# Patient Record
Sex: MALE | Race: WHITE | NOT HISPANIC OR LATINO | Employment: OTHER | URBAN - NONMETROPOLITAN AREA
[De-identification: names, ages, dates, MRNs, and addresses within clinical notes are randomized per-mention and may not be internally consistent; named-entity substitution may affect disease eponyms.]

---

## 2017-01-05 ENCOUNTER — ANESTHESIA EVENT (OUTPATIENT)
Dept: GASTROENTEROLOGY | Facility: HOSPITAL | Age: 77
End: 2017-01-05

## 2017-01-06 ENCOUNTER — ANESTHESIA (OUTPATIENT)
Dept: GASTROENTEROLOGY | Facility: HOSPITAL | Age: 77
End: 2017-01-06

## 2017-01-06 PROCEDURE — 25010000002 PROPOFOL 10 MG/ML EMULSION: Performed by: NURSE ANESTHETIST, CERTIFIED REGISTERED

## 2017-01-06 RX ORDER — PROPOFOL 10 MG/ML
VIAL (ML) INTRAVENOUS AS NEEDED
Status: DISCONTINUED | OUTPATIENT
Start: 2017-01-06 | End: 2017-01-06 | Stop reason: SURG

## 2017-01-06 RX ADMIN — PROPOFOL 50 MG: 10 INJECTION, EMULSION INTRAVENOUS at 08:23

## 2017-01-06 RX ADMIN — PROPOFOL 50 MG: 10 INJECTION, EMULSION INTRAVENOUS at 07:53

## 2017-01-06 RX ADMIN — PROPOFOL 25 MG: 10 INJECTION, EMULSION INTRAVENOUS at 07:55

## 2017-01-06 RX ADMIN — PROPOFOL 25 MG: 10 INJECTION, EMULSION INTRAVENOUS at 07:50

## 2017-01-06 RX ADMIN — PROPOFOL 50 MG: 10 INJECTION, EMULSION INTRAVENOUS at 08:00

## 2017-01-06 RX ADMIN — PROPOFOL 50 MG: 10 INJECTION, EMULSION INTRAVENOUS at 08:05

## 2017-01-06 RX ADMIN — PROPOFOL 25 MG: 10 INJECTION, EMULSION INTRAVENOUS at 07:45

## 2017-01-06 RX ADMIN — PROPOFOL 50 MG: 10 INJECTION, EMULSION INTRAVENOUS at 08:15

## 2017-01-06 RX ADMIN — PROPOFOL 25 MG: 10 INJECTION, EMULSION INTRAVENOUS at 08:27

## 2017-01-06 NOTE — ANESTHESIA POSTPROCEDURE EVALUATION
Patient: Jonas Snow    Procedure Summary     Date Anesthesia Start Anesthesia Stop Room / Location    01/06/17 0741 0838 [facility] ENDOSCOPY 5 / [facility] ENDOSCOPY       Procedure Diagnosis Surgeon Provider    COLONOSCOPY WITH ANESTHESIA (N/A ) Colon polyps; Malignant neoplasm of colon, unspecified part of colon  (Colon polyps [K63.5]; Malignant neoplasm of colon, unspecified part of colon [C18.9]) MD Ricky Ch CRNA          Anesthesia Type: general  Last vitals  /76 (01/06/17 0845)    Temp      Pulse 81 (01/06/17 0845)   Resp 20 (01/06/17 0845)    SpO2 97 % (01/06/17 0845)      Post Anesthesia Care and Evaluation    Patient location during evaluation: PACU  Patient participation: complete - patient participated  Level of consciousness: awake and alert  Pain score: 0  Pain management: adequate  Airway patency: patent  Anesthetic complications: No PONV, no malignant hyperthermia, no difficult airway, no acute intermittent porphyria, no failed pre-medication, and no pseudocholinesterase deficiency.  Cardiovascular status: acceptable and stable  Respiratory status: acceptable  Hydration status: acceptable

## 2017-01-06 NOTE — ANESTHESIA PREPROCEDURE EVALUATION
Anesthesia Evaluation     Patient summary reviewed and Nursing notes reviewed    No history of anesthetic complications   Airway   Mallampati: I  TM distance: >3 FB  Neck ROM: full  no difficulty expected  Dental    (+) poor dentation    Pulmonary - normal exam    breath sounds clear to auscultation  (+) hx of smoking,   Cardiovascular - normal exam  Exercise tolerance: good (4-7 METS)    Neuro/Psych- negative ROS  GI/Hepatic/Renal/Endo    (+)  hiatal hernia, GERD well controlled, liver disease,     Musculoskeletal (-) negative ROS    Abdominal  - normal exam   Substance History - negative use     OB/GYN negative ob/gyn ROS         Other - negative ROS                            Anesthesia Plan    ASA 3     general     intravenous induction   Anesthetic plan and risks discussed with patient and spouse/significant other.

## 2017-01-09 ENCOUNTER — TELEPHONE (OUTPATIENT)
Dept: GASTROENTEROLOGY | Facility: CLINIC | Age: 77
End: 2017-01-09

## 2017-01-09 NOTE — TELEPHONE ENCOUNTER
Please inform patient that he does indeed have colon cancer as we already knew.  It is exactly where we knew that it was.  The rest of the polyps however did not have any cancer in them.    My recommendation remains the same.  He needs to have a right colon resection.  This needs to involve the ink artis at the hepatic flexure.  [facility]    Repeat colonoscopy with me in one year.    Haydee Krishnamurthy MD

## 2017-06-24 ENCOUNTER — HOSPITAL ENCOUNTER (EMERGENCY)
Facility: HOSPITAL | Age: 77
Discharge: HOME | End: 2017-06-24
Attending: FAMILY MEDICINE | Admitting: FAMILY MEDICINE

## 2017-06-24 ENCOUNTER — APPOINTMENT (OUTPATIENT)
Dept: CT IMAGING | Facility: HOSPITAL | Age: 77
End: 2017-06-24

## 2017-06-24 VITALS
DIASTOLIC BLOOD PRESSURE: 91 MMHG | OXYGEN SATURATION: 99 % | TEMPERATURE: 98.5 F | HEART RATE: 89 BPM | BODY MASS INDEX: 24.91 KG/M2 | SYSTOLIC BLOOD PRESSURE: 154 MMHG | HEIGHT: 70 IN | RESPIRATION RATE: 23 BRPM | WEIGHT: 174 LBS

## 2017-06-24 DIAGNOSIS — R10.13 EPIGASTRIC PAIN: ICD-10-CM

## 2017-06-24 DIAGNOSIS — K20.90 ESOPHAGITIS: Primary | ICD-10-CM

## 2017-06-24 LAB
ALBUMIN SERPL-MCNC: 4 G/DL (ref 3.5–5)
ALBUMIN/GLOB SERPL: 1.3 G/DL (ref 1.1–2.5)
ALP SERPL-CCNC: 128 U/L (ref 24–120)
ALT SERPL W P-5'-P-CCNC: 35 U/L (ref 0–54)
AMYLASE SERPL-CCNC: 96 U/L (ref 30–110)
ANION GAP SERPL CALCULATED.3IONS-SCNC: 13 MMOL/L (ref 4–13)
AST SERPL-CCNC: 39 U/L (ref 7–45)
BASOPHILS # BLD MANUAL: 0.13 10*3/MM3 (ref 0–0.2)
BASOPHILS NFR BLD AUTO: 1 % (ref 0–2)
BILIRUB SERPL-MCNC: 2.2 MG/DL (ref 0.1–1)
BUN BLD-MCNC: 17 MG/DL (ref 5–21)
BUN/CREAT SERPL: 20.7 (ref 7–25)
CALCIUM SPEC-SCNC: 9.4 MG/DL (ref 8.4–10.4)
CHLORIDE SERPL-SCNC: 102 MMOL/L (ref 98–110)
CO2 SERPL-SCNC: 24 MMOL/L (ref 24–31)
CREAT BLD-MCNC: 0.82 MG/DL (ref 0.5–1.4)
D-LACTATE SERPL-SCNC: 1.8 MMOL/L (ref 0.5–2)
D-LACTATE SERPL-SCNC: 3.4 MMOL/L (ref 0.5–2)
DEPRECATED RDW RBC AUTO: 54.1 FL (ref 40–54)
ERYTHROCYTE [DISTWIDTH] IN BLOOD BY AUTOMATED COUNT: 20.8 % (ref 12–15)
GFR SERPL CREATININE-BSD FRML MDRD: 92 ML/MIN/1.73
GLOBULIN UR ELPH-MCNC: 3.2 GM/DL
GLUCOSE BLD-MCNC: 114 MG/DL (ref 70–100)
HCT VFR BLD AUTO: 37.8 % (ref 40–52)
HGB BLD-MCNC: 11.6 G/DL (ref 14–18)
HOLD SPECIMEN: NORMAL
HOLD SPECIMEN: NORMAL
LIPASE SERPL-CCNC: 50 U/L (ref 23–203)
LYMPHOCYTES # BLD MANUAL: 1.44 10*3/MM3 (ref 0.72–4.86)
LYMPHOCYTES NFR BLD MANUAL: 11 % (ref 15–45)
LYMPHOCYTES NFR BLD MANUAL: 5 % (ref 4–12)
MCH RBC QN AUTO: 22.4 PG (ref 28–32)
MCHC RBC AUTO-ENTMCNC: 30.7 G/DL (ref 33–36)
MCV RBC AUTO: 73 FL (ref 82–95)
MICROCYTES BLD QL: ABNORMAL
MONOCYTES # BLD AUTO: 0.65 10*3/MM3 (ref 0.19–1.3)
NEUTROPHILS # BLD AUTO: 10.86 10*3/MM3 (ref 1.87–8.4)
NEUTROPHILS NFR BLD MANUAL: 83 % (ref 39–78)
PLATELET # BLD AUTO: 257 10*3/MM3 (ref 130–400)
PMV BLD AUTO: 9.2 FL (ref 6–12)
POTASSIUM BLD-SCNC: 4.2 MMOL/L (ref 3.5–5.3)
PROT SERPL-MCNC: 7.2 G/DL (ref 6.3–8.7)
RBC # BLD AUTO: 5.18 10*6/MM3 (ref 4.8–5.9)
SCAN SLIDE: NORMAL
SMALL PLATELETS BLD QL SMEAR: ADEQUATE
SODIUM BLD-SCNC: 139 MMOL/L (ref 135–145)
TROPONIN I SERPL-MCNC: 0 NG/ML (ref 0–0.07)
WBC MORPH BLD: NORMAL
WBC NRBC COR # BLD: 13.08 10*3/MM3 (ref 4.8–10.8)
WHOLE BLOOD HOLD SPECIMEN: NORMAL
WHOLE BLOOD HOLD SPECIMEN: NORMAL

## 2017-06-24 PROCEDURE — 99284 EMERGENCY DEPT VISIT MOD MDM: CPT

## 2017-06-24 PROCEDURE — 82150 ASSAY OF AMYLASE: CPT | Performed by: FAMILY MEDICINE

## 2017-06-24 PROCEDURE — 83605 ASSAY OF LACTIC ACID: CPT | Performed by: FAMILY MEDICINE

## 2017-06-24 PROCEDURE — 83690 ASSAY OF LIPASE: CPT | Performed by: FAMILY MEDICINE

## 2017-06-24 PROCEDURE — 25010000002 ONDANSETRON PER 1 MG: Performed by: FAMILY MEDICINE

## 2017-06-24 PROCEDURE — 0 IOPAMIDOL 61 % SOLUTION: Performed by: FAMILY MEDICINE

## 2017-06-24 PROCEDURE — 96375 TX/PRO/DX INJ NEW DRUG ADDON: CPT

## 2017-06-24 PROCEDURE — 85007 BL SMEAR W/DIFF WBC COUNT: CPT | Performed by: FAMILY MEDICINE

## 2017-06-24 PROCEDURE — 74177 CT ABD & PELVIS W/CONTRAST: CPT

## 2017-06-24 PROCEDURE — 84484 ASSAY OF TROPONIN QUANT: CPT

## 2017-06-24 PROCEDURE — 96361 HYDRATE IV INFUSION ADD-ON: CPT

## 2017-06-24 PROCEDURE — 93005 ELECTROCARDIOGRAM TRACING: CPT | Performed by: FAMILY MEDICINE

## 2017-06-24 PROCEDURE — 85025 COMPLETE CBC W/AUTO DIFF WBC: CPT | Performed by: FAMILY MEDICINE

## 2017-06-24 PROCEDURE — 93010 ELECTROCARDIOGRAM REPORT: CPT | Performed by: INTERNAL MEDICINE

## 2017-06-24 PROCEDURE — 80053 COMPREHEN METABOLIC PANEL: CPT | Performed by: FAMILY MEDICINE

## 2017-06-24 PROCEDURE — 96374 THER/PROPH/DIAG INJ IV PUSH: CPT

## 2017-06-24 RX ORDER — PANTOPRAZOLE SODIUM 40 MG/10ML
40 INJECTION, POWDER, LYOPHILIZED, FOR SOLUTION INTRAVENOUS ONCE
Status: COMPLETED | OUTPATIENT
Start: 2017-06-24 | End: 2017-06-24

## 2017-06-24 RX ORDER — ONDANSETRON 2 MG/ML
4 INJECTION INTRAMUSCULAR; INTRAVENOUS ONCE
Status: COMPLETED | OUTPATIENT
Start: 2017-06-24 | End: 2017-06-24

## 2017-06-24 RX ORDER — HYDROCODONE BITARTRATE AND ACETAMINOPHEN 7.5; 325 MG/1; MG/1
1 TABLET ORAL EVERY 4 HOURS PRN
Qty: 20 TABLET | Refills: 0 | Status: ON HOLD | OUTPATIENT
Start: 2017-06-24 | End: 2018-09-28

## 2017-06-24 RX ORDER — ONDANSETRON 4 MG/1
4 TABLET, ORALLY DISINTEGRATING ORAL EVERY 4 HOURS PRN
Qty: 10 TABLET | Refills: 0 | Status: ON HOLD | OUTPATIENT
Start: 2017-06-24 | End: 2019-09-02

## 2017-06-24 RX ORDER — PROMETHAZINE HYDROCHLORIDE 25 MG/ML
25 INJECTION, SOLUTION INTRAMUSCULAR; INTRAVENOUS ONCE
Status: DISCONTINUED | OUTPATIENT
Start: 2017-06-24 | End: 2017-06-24 | Stop reason: HOSPADM

## 2017-06-24 RX ORDER — ALUMINA, MAGNESIA, AND SIMETHICONE 2400; 2400; 240 MG/30ML; MG/30ML; MG/30ML
10 SUSPENSION ORAL ONCE
Status: COMPLETED | OUTPATIENT
Start: 2017-06-24 | End: 2017-06-24

## 2017-06-24 RX ADMIN — IOPAMIDOL 100 ML: 612 INJECTION, SOLUTION INTRAVENOUS at 03:53

## 2017-06-24 RX ADMIN — ONDANSETRON 4 MG: 2 INJECTION INTRAMUSCULAR; INTRAVENOUS at 03:15

## 2017-06-24 RX ADMIN — LIDOCAINE HYDROCHLORIDE 15 ML: 20 SOLUTION ORAL; TOPICAL at 05:28

## 2017-06-24 RX ADMIN — ALUMINUM HYDROXIDE, MAGNESIUM HYDROXIDE, AND DIMETHICONE 10 ML: 400; 400; 40 SUSPENSION ORAL at 05:28

## 2017-06-24 RX ADMIN — PANTOPRAZOLE SODIUM 40 MG: 40 INJECTION, POWDER, FOR SOLUTION INTRAVENOUS at 03:16

## 2017-06-24 RX ADMIN — SODIUM CHLORIDE 1000 ML: 9 INJECTION, SOLUTION INTRAVENOUS at 06:14

## 2017-06-24 RX ADMIN — ONDANSETRON HYDROCHLORIDE 4 MG: 2 SOLUTION INTRAMUSCULAR; INTRAVENOUS at 03:45

## 2017-06-24 NOTE — ED PROVIDER NOTES
Subjective   HPI Comments: Patient presents tonight for acute abdominal pain nausea vomiting.  Patient states that he has been vomiting off and on at times nonstop for the last 2 days.  He states that he kept thinking it was getting get better however it just keeps getting worse.  He states he has a known history of acid reflux and sometimes he gets this nausea and vomiting.  He states normally it does not last this long however.  Patient states that he did take his home medications he is on Prilosec at home he is also tried some over-the-counter treatments but nothing seems to be helping.  He cannot keep anything down not food and fluids.  He describes the abdominal pain as burning sharp it starts in the epigastric area and goes all the way up the substernal area.      History provided by:  Patient   used: No        Review of Systems   Constitutional: Negative for activity change, chills, fatigue and fever.   HENT: Negative for congestion and dental problem.    Eyes: Negative for pain, discharge and itching.   Respiratory: Negative for apnea and chest tightness.    Cardiovascular: Negative for chest pain and palpitations.   Gastrointestinal: Positive for abdominal pain, nausea and vomiting. Negative for diarrhea.   Endocrine: Negative for polydipsia and polyuria.   Genitourinary: Negative for difficulty urinating and dysuria.   Musculoskeletal: Negative for arthralgias, neck pain and neck stiffness.   Neurological: Negative for dizziness and headaches.   Hematological: Negative for adenopathy. Does not bruise/bleed easily.   Psychiatric/Behavioral: Negative for agitation and behavioral problems.       Past Medical History:   Diagnosis Date   • Cancer     Colon/Liver   • Colon cancer    • GERD (gastroesophageal reflux disease)    • Hiatal hernia    • Liver disease    • Peptic stricture of esophagus    • Schatzki's ring    • Vitamin D deficiency        No Known Allergies    Past Surgical History:    Procedure Laterality Date   • COLONOSCOPY N/A 1/6/2017    Procedure: COLONOSCOPY WITH ANESTHESIA;  Surgeon: Haydee Krishnamurthy MD;  Location: [facility] ENDOSCOPY;  Service:    • ENDOSCOPY  12/19/2014   • TONSILLECTOMY     • VENOUS ACCESS DEVICE (PORT) INSERTION         Family History   Problem Relation Age of Onset   • Colon cancer Neg Hx    • Colon polyps Neg Hx    • Esophageal cancer Neg Hx    • Liver cancer Neg Hx    • Liver disease Neg Hx    • Rectal cancer Neg Hx    • Stomach cancer Neg Hx        Social History     Social History   • Marital status:      Spouse name: N/A   • Number of children: N/A   • Years of education: N/A     Social History Main Topics   • Smoking status: Former Smoker     Quit date: 1/6/2009   • Smokeless tobacco: Never Used   • Alcohol use Yes      Comment: occ   • Drug use: No   • Sexual activity: Not Asked     Other Topics Concern   • None     Social History Narrative       Lab Results (last 24 hours)     Procedure Component Value Units Date/Time    CBC & Differential [[id number]] Collected:  06/24/17 0243    Specimen:  Blood Updated:  06/24/17 0416    Narrative:       The following orders were created for panel order CBC & Differential.  Procedure                               Abnormality         Status                     ---------                               -----------         ------                     Manual Differential[[id number]]          Abnormal            Final result               Scan Slide[[id number]]                                       Final result               CBC Auto Differential[[id number]]        Abnormal            Final result                 Please view results for these tests on the individual orders.    Comprehensive Metabolic Panel [[id number]]  (Abnormal) Collected:  06/24/17 0243    Specimen:  Blood Updated:  06/24/17 0307     Glucose 114 (H) mg/dL      BUN 17 mg/dL      Creatinine 0.82 mg/dL      Sodium 139 mmol/L      Potassium 4.2 mmol/L      Chloride 102  mmol/L      CO2 24.0 mmol/L      Calcium 9.4 mg/dL      Total Protein 7.2 g/dL      Albumin 4.00 g/dL      ALT (SGPT) 35 U/L      AST (SGOT) 39 U/L      Alkaline Phosphatase 128 (H) U/L      Total Bilirubin 2.2 (H) mg/dL      eGFR Non African Amer 92 mL/min/1.73      Globulin 3.2 gm/dL      A/G Ratio 1.3 g/dL      BUN/Creatinine Ratio 20.7     Anion Gap 13.0 mmol/L     Narrative:       The MDRD GFR formula is only valid for adults with stable renal function between ages 18 and 70.    Amylase [[id number]]  (Normal) Collected:  06/24/17 0243    Specimen:  Blood Updated:  06/24/17 0307     Amylase 96 U/L     Lipase [[id number]]  (Normal) Collected:  06/24/17 0243    Specimen:  Blood Updated:  06/24/17 0307     Lipase 50 U/L     Lactic Acid, Plasma [[id number]]  (Abnormal) Collected:  06/24/17 0243    Specimen:  Blood Updated:  06/24/17 0320     Lactate 3.4 (C) mmol/L     CBC Auto Differential [[id number]]  (Abnormal) Collected:  06/24/17 0243    Specimen:  Blood Updated:  06/24/17 0416     WBC 13.08 (H) 10*3/mm3      RBC 5.18 10*6/mm3      Hemoglobin 11.6 (L) g/dL      Hematocrit 37.8 (L) %      MCV 73.0 (L) fL      MCH 22.4 (L) pg      MCHC 30.7 (L) g/dL      RDW 20.8 (H) %      RDW-SD 54.1 (H) fl      MPV 9.2 fL      Platelets 257 10*3/mm3     Scan Slide [[id number]] Collected:  06/24/17 0243    Specimen:  Blood Updated:  06/24/17 0416     Scan Slide --      See Manual Differential Results       Manual Differential [[id number]]  (Abnormal) Collected:  06/24/17 0243    Specimen:  Blood Updated:  06/24/17 0416     Neutrophil % 83.0 (H) %      Lymphocyte % 11.0 (L) %      Monocyte % 5.0 %      Basophil % 1.0 %      Neutrophils Absolute 10.86 (H) 10*3/mm3      Lymphocytes Absolute 1.44 10*3/mm3      Monocytes Absolute 0.65 10*3/mm3      Basophils Absolute 0.13 10*3/mm3      Microcytes Slight/1+     WBC Morphology Normal     Platelet Estimate Adequate    POC Troponin, Rapid [[id number]]  (Normal) Collected:  06/24/17 1263     "Specimen:  Blood Updated:  06/24/17 0305     Troponin I 0.00 ng/mL       Serial Number: [id number]    : [id number]       Lactic Acid, Plasma [[id number]]  (Normal) Collected:  06/24/17 0650    Specimen:  Blood Updated:  06/24/17 0707     Lactate 1.8 mmol/L           Objective   Physical Exam   Constitutional: He is oriented to person, place, and time. He appears well-developed and well-nourished.   HENT:   Head: Normocephalic and atraumatic.   Eyes: Conjunctivae and EOM are normal. Pupils are equal, round, and reactive to light.   Neck: Normal range of motion. Neck supple.   Cardiovascular: Normal rate and regular rhythm.    Pulmonary/Chest: Effort normal and breath sounds normal.   Abdominal: Soft. Bowel sounds are normal.   Musculoskeletal: He exhibits no tenderness or deformity.   Neurological: He is alert and oriented to person, place, and time.   Skin: Skin is warm and dry.   Nursing note and vitals reviewed.      Procedures         CT Abdomen Pelvis With Contrast    (Results Pending)       /91  Pulse 89  Temp 98.5 °F (36.9 °C) (Oral)   Resp 23  Ht 69.5\" (176.5 cm)  Wt 174 lb (78.9 kg)  SpO2 99%  BMI 25.33 kg/m2    ED Course    ED Course   Comment By Time   I took over from Dr. Keen at shift change.  When the patient's lab work all came back I went to talk to him.  I told him the lab work looks fine and his CT scan just showed the esophagitis.  I told him if the vomiting and pain been bad enough we could put him in the hospital or with a try to treat him as an outpatient.  He preferred to be treated as an outpatient so we will do that.  I spoke with his son outside the room and told him the same things.  He is discharged in stable condition. Deshaun Roland Jr., MD 06/24 0806       Medications   promethazine (PHENERGAN) injection 25 mg (not administered)   ondansetron (ZOFRAN) injection 4 mg (4 mg Intravenous Given 6/24/17 0315)   pantoprazole (PROTONIX) injection 40 mg (40 mg Intravenous Given " 6/24/17 6966)   ondansetron (ZOFRAN) injection 4 mg (4 mg Intravenous Given 6/24/17 7745)   iopamidol (ISOVUE-300) 61 % injection 100 mL (100 mL Intravenous Given 6/24/17 7463)   aluminum-magnesium hydroxide-simethicone (MAALOX MAX) 400-400-40 MG/5ML suspension 10 mL (10 mL Oral Given 6/24/17 0528)   lidocaine viscous (XYLOCAINE) 2 % mouth solution 15 mL (15 mL Mouth/Throat Given 6/24/17 0528)   sodium chloride 0.9 % bolus 1,000 mL (1,000 mL Intravenous New Bag 6/24/17 0614)            MDM  Number of Diagnoses or Management Options  Epigastric pain: established and worsening  Esophagitis: established and worsening     Amount and/or Complexity of Data Reviewed  Clinical lab tests: ordered and reviewed  Tests in the radiology section of CPT®: ordered and reviewed  Tests in the medicine section of CPT®: ordered and reviewed  Decide to obtain previous medical records or to obtain history from someone other than the patient: yes  Review and summarize past medical records: yes  Independent visualization of images, tracings, or specimens: yes    Risk of Complications, Morbidity, and/or Mortality  Presenting problems: high  Diagnostic procedures: high  Management options: moderate  General comments: Patient left in the care of Dr. Roland at shift change.    Patient Progress  Patient progress: improved      Final diagnoses:   Esophagitis   Epigastric pain          Deshaun Roland Jr., MD  06/24/17 6941

## 2017-09-06 ENCOUNTER — TELEPHONE (OUTPATIENT)
Dept: GASTROENTEROLOGY | Facility: CLINIC | Age: 77
End: 2017-09-06

## 2017-09-06 NOTE — TELEPHONE ENCOUNTER
Patient has appt for 10/04/17 but says he isn't gonna be able to wait till then.  He is unable to swallow his medicine pills and is unable to drink except very small sips of water.  While at [facility] last week he was unable to eat and what food he got down he vomited up.  Can this patient be worked in sooner?

## 2017-09-06 NOTE — TELEPHONE ENCOUNTER
Patient said he despises the thought of having to go to the hospital as much as he has had to be in one in the past.  He said he is going to wait until it gets him down before he goes.  He has been unable to eat today but as long as he can take some sips of water he said he was going to wait.  I advised against waiting if it was to the point he couldn't eat but he wouldn't change his mind.  Patient was very polite during the phone call and voiced his understanding of everything I was telling him.

## 2017-09-06 NOTE — TELEPHONE ENCOUNTER
It sounds to me like the patient is much more ill than he was when he went to the ER last time.  I feel that he should go back to the ER as he may need to be admitted.  We are very booked in the office and even if I could work him in with still could not get an endoscopy performed until over a month.  Left me know what he decides.  I think Ivory can confirm this that we have no openings.

## 2017-10-04 ENCOUNTER — OFFICE VISIT (OUTPATIENT)
Dept: GASTROENTEROLOGY | Facility: CLINIC | Age: 77
End: 2017-10-04

## 2017-10-04 VITALS
SYSTOLIC BLOOD PRESSURE: 142 MMHG | WEIGHT: 160 LBS | BODY MASS INDEX: 23.7 KG/M2 | DIASTOLIC BLOOD PRESSURE: 76 MMHG | HEIGHT: 69 IN | HEART RATE: 73 BPM | OXYGEN SATURATION: 99 % | TEMPERATURE: 97.3 F

## 2017-10-04 DIAGNOSIS — K21.9 GASTROESOPHAGEAL REFLUX DISEASE WITHOUT ESOPHAGITIS: ICD-10-CM

## 2017-10-04 DIAGNOSIS — C22.7 OTHER SPECIFIED CARCINOMAS OF LIVER (HCC): ICD-10-CM

## 2017-10-04 DIAGNOSIS — C18.9 MALIGNANT NEOPLASM OF COLON, UNSPECIFIED PART OF COLON (HCC): ICD-10-CM

## 2017-10-04 DIAGNOSIS — K92.0 HEMATEMESIS WITHOUT NAUSEA: ICD-10-CM

## 2017-10-04 DIAGNOSIS — K22.2 SCHATZKI'S RING: ICD-10-CM

## 2017-10-04 DIAGNOSIS — R13.19 OTHER DYSPHAGIA: Primary | ICD-10-CM

## 2017-10-04 PROBLEM — C22.9 LIVER CANCER (HCC): Status: ACTIVE | Noted: 2017-10-04

## 2017-10-04 PROCEDURE — 99214 OFFICE O/P EST MOD 30 MIN: CPT | Performed by: NURSE PRACTITIONER

## 2017-10-04 RX ORDER — OMEPRAZOLE 20 MG/1
20 CAPSULE, DELAYED RELEASE ORAL 2 TIMES DAILY
Qty: 60 CAPSULE | Refills: 11 | Status: SHIPPED | OUTPATIENT
Start: 2017-10-04 | End: 2017-10-04 | Stop reason: SDUPTHER

## 2017-10-04 RX ORDER — SUCRALFATE ORAL 1 G/10ML
1 SUSPENSION ORAL 4 TIMES DAILY
Qty: 1 EACH | Refills: 1 | Status: SHIPPED | OUTPATIENT
Start: 2017-10-04 | End: 2018-01-19 | Stop reason: HOSPADM

## 2017-10-04 RX ORDER — OMEPRAZOLE 20 MG/1
20 CAPSULE, DELAYED RELEASE ORAL 2 TIMES DAILY
Qty: 60 CAPSULE | Refills: 11 | Status: SHIPPED | OUTPATIENT
Start: 2017-10-04 | End: 2017-10-05 | Stop reason: HOSPADM

## 2017-10-04 NOTE — PROGRESS NOTES
"Chief Complaint:   Chief Complaint   Patient presents with   • Difficulty Swallowing     Patient states that he couldn't swallow without getting sick.         Patient ID: Jonas Snow is a 77 y.o. male     History of Present Illness:  This is a very pleasant 77-year-old male who was referred to our office from [facility] with complaints of difficulty swallowing and vomiting.  The patient tells me that he has begun to have difficulty swallowing for 2-3 months now.  He states that he has difficulty swallowing his pills as well as solids.  He states that he tries to eat more soft than liquid food.  He states that it feels as though it gets stuck in the mid esophageal area.  The patient states that approximately 2 weeks ago he did vomit up blood twice.  He describes it as bright red.  He denies any recent nosebleeds prior to that.  The patient does.  A history of GERD and has had Schatzki's ring in the past.    The patient denies any epigastric pain, odynophagia, pyrosis.  He denies any fever or chills.  He denies any melena or hematochezia.  The patient does also carry a history of colon cancer with metastases to the liver he [facility]  He denies any unintentional weight loss or loss of appetite.  He states that he is unsure of what meds he is taking.  He states that he thinks he is taking a \"stomach pill\" but is not sure.  He denies any chronic NSAID use.      On 11/18/14 the patient underwent an upper endoscopy for complaints of dysphagia with findings of benign appearing intrinsic moderate stenosis 9 mm in diameter found at the GE junction that was dilated.  On 12/19/14 the patient again underwent an upper endoscopy for dysphagia with findings of a non-obstructing Schatzki's ring that was dilated at that GE junction.    Past Medical History:   Diagnosis Date   • Cancer     Colon/Liver   • Colon cancer    • GERD (gastroesophageal reflux disease)    • Hiatal hernia    • " Liver disease    • Peptic stricture of esophagus    • Schatzki's ring    • Vitamin D deficiency        Past Surgical History:   Procedure Laterality Date   • COLONOSCOPY N/A 1/6/2017    Procedure: COLONOSCOPY WITH ANESTHESIA;  Surgeon: Haydee Krishnamurthy MD;  Location: [facility] ENDOSCOPY;  Service:    • ENDOSCOPY  12/19/2014   • TONSILLECTOMY     • VENOUS ACCESS DEVICE (PORT) INSERTION           Current Outpatient Prescriptions:   •  aspirin 81 MG EC tablet, Take 81 mg by mouth Daily., Disp: , Rfl:   •  aspirin  MG tablet, Take 325 mg by mouth Every 6 (Six) Hours As Needed. Take 1/2 a pill daily, Disp: , Rfl:   •  cholecalciferol (VITAMIN D3) 1000 UNITS tablet, Take 1,000 Units by mouth Daily., Disp: , Rfl:   •  HYDROcodone-acetaminophen (NORCO) 7.5-325 MG per tablet, Take 1 tablet by mouth Every 4 (Four) Hours As Needed for Moderate Pain (4-6)., Disp: 20 tablet, Rfl: 0  •  NON FORMULARY, Pill for prostate, Disp: , Rfl:   •  omeprazole (priLOSEC) 20 MG capsule, Take 1 capsule by mouth 2 (Two) Times a Day., Disp: 60 capsule, Rfl: 11  •  ondansetron ODT (ZOFRAN-ODT) 4 MG disintegrating tablet, Take 1 tablet by mouth Every 4 (Four) Hours As Needed for Nausea or Vomiting., Disp: 10 tablet, Rfl: 0  •  sucralfate (CARAFATE) 1 GM/10ML suspension, Take 10 mL by mouth 4 (Four) Times a Day., Disp: 1 each, Rfl: 1    No Known Allergies    Social History     Social History   • Marital status:      Spouse name: N/A   • Number of children: N/A   • Years of education: N/A     Occupational History   • Not on file.     Social History Main Topics   • Smoking status: Former Smoker     Quit date: 1/6/2009   • Smokeless tobacco: Never Used   • Alcohol use Yes      Comment: occ   • Drug use: No   • Sexual activity: Not on file     Other Topics Concern   • Not on file     Social History Narrative       Family History   Problem Relation Age of Onset   • Colon cancer Neg Hx    • Colon polyps Neg Hx    • Esophageal cancer Neg Hx    •  "Liver cancer Neg Hx    • Liver disease Neg Hx    • Rectal cancer Neg Hx    • Stomach cancer Neg Hx        Vitals:    10/04/17 0857   BP: 142/76   Pulse: 73   Temp: 97.3 °F (36.3 °C)   SpO2: 99%   Weight: 160 lb (72.6 kg)   Height: 69\" (175.3 cm)       Review of Systems:    General:    Present -feeling well   Skin:    Not Present-Rash   HEENT:     Not Present-Acute visual changes or Acute hearing changes   Neck :    Not Present- swollen glands   Genitourinary:      Not Present- burning, frequency, urgency hematuria, dysuria,   Cardiovascular:   Not Present-chest pain, palpitations, or pressure   Respiratory:   Not Present- shortness of breath or cough   Gastrointestinal:  Musculoskeletal:  Neurological:  Psychiatric:   Present as mentioned in the HP    Not Present. Recent gait disturbances.    Not Present-Seizures and weakness in extremities.    Not Present- Anxiety or Depression.       Physical Exam:    General Appearance:    Alert, cooperative, in no acute distress   Psych:    Mood appropriate    Eyes:          conjunctivae and sclerae normal, no   icterus, no pallor   ENMT:    Ears appear intact with no abnormalities noted oral mucosa moist   Neck:   No adenopathy, supple, trachea midline, no thyromegaly, no   carotid bruit, no JVD    Cardiovascular:    Regular rhythm and normal rate, normal S1 and S2, no            murmur, no gallop, no rub, no click   Gastrointestinal:     Inspection normal.  Normal bowel sounds, no masses, no organomegaly, soft round non-tender, non-distended, no guarding, no rebound or tenderness. No hepatosplenomegaly.   Skin:   No bleeding, bruising or rash   Neurologic:   nonfocal       Lab Results   Component Value Date    WBC 13.08 (H) 06/24/2017    HGB 11.6 (L) 06/24/2017    HCT 37.8 (L) 06/24/2017     06/24/2017        Lab Results   Component Value Date     06/24/2017    K 4.2 06/24/2017     06/24/2017    CO2 24.0 06/24/2017    BUN 17 06/24/2017    CREATININE 0.82 " 06/24/2017    BILITOT 2.2 (H) 06/24/2017    ALKPHOS 128 (H) 06/24/2017    ALT 35 06/24/2017    AST 39 06/24/2017    GLUCOSE 114 (H) 06/24/2017       No results found for: INR    Assessment and Plan:    Jonas was seen today for difficulty swallowing.    Diagnoses and all orders for this visit:    Other dysphagia  -     Case Request; Standing  -     Case Request EGD    Hematemesis without nausea  -     Case Request; Standing  -     Case Request    Gastroesophageal reflux disease without esophagitis  -     Case Request; Standing  -     Case Request    Schatzki's ring  -     Case Request; Standing  -     Case Request    Other specified carcinomas of liver  Currently being treated with chemotherapy     Malignant neoplasm of colon, unspecified part of colon    Other orders  -     sucralfate (CARAFATE) 1 GM/10ML suspension; Take 10 mL by mouth 4 (Four) Times a Day.  -     omeprazole (priLOSEC) 20 MG capsule; Take 1 capsule by mouth 2 (Two) Times a Day.  -     Implement Anesthesia Orders Day of Procedure; Standing  -     Obtain Informed Consent; Standing      I have scheduled the patient for an EGD.  I have ordered Carafate suspension as the patient states he has a very difficult time swallowing pills or solids.  I will also increase his Prilosec to 20 mg however he is not sure if he has been taking any of this medication.    Next follow-up appointment    The risks, benefits, and alternatives of endoscopy were reviewed with the patient today.  Risks including perforation, with or without dilation, possibly requiring surgery.  Additional risks include risk of bleeding.  There is also the risk of a drug reaction or problems with anesthesia.  This will be discussed with the further by the anesthesia team on the day of the procedure. The benefits include the diagnosis and management of disease of the upper digestive tract.  Alternatives to endoscopy include upper GI series, laboratory testing, radiographic evaluation, or no  intervention.  The patient verbalizes understanding and agrees.      EMR Dragon/Transcription disclaimer:  Much of this encounter note is an electronic transcription/translation of spoken language to printed text. The electronic translation of spoken language may permit erroneous, or at times, nonsensical words or phrases to be inadvertently transcribed; although I have reviewed the note for such errors, some may still exist.

## 2017-10-05 ENCOUNTER — HOSPITAL ENCOUNTER (OUTPATIENT)
Facility: HOSPITAL | Age: 77
Setting detail: HOSPITAL OUTPATIENT SURGERY
Discharge: HOME | End: 2017-10-05
Attending: INTERNAL MEDICINE | Admitting: INTERNAL MEDICINE

## 2017-10-05 ENCOUNTER — ANESTHESIA EVENT (OUTPATIENT)
Dept: GASTROENTEROLOGY | Facility: HOSPITAL | Age: 77
End: 2017-10-05

## 2017-10-05 ENCOUNTER — ANESTHESIA (OUTPATIENT)
Dept: GASTROENTEROLOGY | Facility: HOSPITAL | Age: 77
End: 2017-10-05

## 2017-10-05 VITALS
TEMPERATURE: 97.6 F | HEIGHT: 69 IN | DIASTOLIC BLOOD PRESSURE: 81 MMHG | WEIGHT: 158 LBS | BODY MASS INDEX: 23.4 KG/M2 | OXYGEN SATURATION: 100 % | HEART RATE: 61 BPM | SYSTOLIC BLOOD PRESSURE: 141 MMHG | RESPIRATION RATE: 18 BRPM

## 2017-10-05 DIAGNOSIS — K92.0 HEMATEMESIS WITHOUT NAUSEA: ICD-10-CM

## 2017-10-05 DIAGNOSIS — R13.19 OTHER DYSPHAGIA: ICD-10-CM

## 2017-10-05 DIAGNOSIS — K22.2 SCHATZKI'S RING: ICD-10-CM

## 2017-10-05 DIAGNOSIS — K21.9 GASTROESOPHAGEAL REFLUX DISEASE WITHOUT ESOPHAGITIS: ICD-10-CM

## 2017-10-05 PROCEDURE — 43248 EGD GUIDE WIRE INSERTION: CPT | Performed by: INTERNAL MEDICINE

## 2017-10-05 PROCEDURE — 88312 SPECIAL STAINS GROUP 1: CPT | Performed by: INTERNAL MEDICINE

## 2017-10-05 PROCEDURE — 43239 EGD BIOPSY SINGLE/MULTIPLE: CPT | Performed by: INTERNAL MEDICINE

## 2017-10-05 PROCEDURE — 88305 TISSUE EXAM BY PATHOLOGIST: CPT | Performed by: INTERNAL MEDICINE

## 2017-10-05 PROCEDURE — 25010000002 PROPOFOL 10 MG/ML EMULSION: Performed by: NURSE ANESTHETIST, CERTIFIED REGISTERED

## 2017-10-05 RX ORDER — SODIUM CHLORIDE 9 MG/ML
500 INJECTION, SOLUTION INTRAVENOUS CONTINUOUS PRN
Status: DISCONTINUED | OUTPATIENT
Start: 2017-10-05 | End: 2017-10-05 | Stop reason: HOSPADM

## 2017-10-05 RX ORDER — SODIUM CHLORIDE 0.9 % (FLUSH) 0.9 %
3 SYRINGE (ML) INJECTION AS NEEDED
Status: DISCONTINUED | OUTPATIENT
Start: 2017-10-05 | End: 2017-10-05 | Stop reason: HOSPADM

## 2017-10-05 RX ORDER — PANTOPRAZOLE SODIUM 40 MG/1
40 TABLET, DELAYED RELEASE ORAL DAILY
Qty: 60 TABLET | Refills: 11 | Status: SHIPPED | OUTPATIENT
Start: 2017-10-05 | End: 2017-10-05

## 2017-10-05 RX ORDER — PANTOPRAZOLE SODIUM 40 MG/1
40 TABLET, DELAYED RELEASE ORAL
Qty: 60 TABLET | Refills: 11 | Status: SHIPPED | OUTPATIENT
Start: 2017-10-05 | End: 2020-08-21 | Stop reason: HOSPADM

## 2017-10-05 RX ORDER — LIDOCAINE HYDROCHLORIDE 20 MG/ML
INJECTION, SOLUTION INFILTRATION; PERINEURAL AS NEEDED
Status: DISCONTINUED | OUTPATIENT
Start: 2017-10-05 | End: 2017-10-05 | Stop reason: SURG

## 2017-10-05 RX ORDER — PROPOFOL 10 MG/ML
VIAL (ML) INTRAVENOUS AS NEEDED
Status: DISCONTINUED | OUTPATIENT
Start: 2017-10-05 | End: 2017-10-05 | Stop reason: SURG

## 2017-10-05 RX ADMIN — PROPOFOL 25 MG: 10 INJECTION, EMULSION INTRAVENOUS at 07:51

## 2017-10-05 RX ADMIN — LIDOCAINE HYDROCHLORIDE 0.5 ML: 10 INJECTION, SOLUTION EPIDURAL; INFILTRATION; INTRACAUDAL; PERINEURAL at 07:29

## 2017-10-05 RX ADMIN — SODIUM CHLORIDE 500 ML: 9 INJECTION, SOLUTION INTRAVENOUS at 07:30

## 2017-10-05 RX ADMIN — PROPOFOL 25 MG: 10 INJECTION, EMULSION INTRAVENOUS at 07:55

## 2017-10-05 RX ADMIN — PROPOFOL 25 MG: 10 INJECTION, EMULSION INTRAVENOUS at 08:06

## 2017-10-05 RX ADMIN — PROPOFOL 25 MG: 10 INJECTION, EMULSION INTRAVENOUS at 08:02

## 2017-10-05 RX ADMIN — LIDOCAINE HYDROCHLORIDE 50 MG: 20 INJECTION, SOLUTION INFILTRATION; PERINEURAL at 07:47

## 2017-10-05 RX ADMIN — PROPOFOL 50 MG: 10 INJECTION, EMULSION INTRAVENOUS at 07:47

## 2017-10-05 RX ADMIN — PROPOFOL 25 MG: 10 INJECTION, EMULSION INTRAVENOUS at 07:59

## 2017-10-05 NOTE — H&P (VIEW-ONLY)
"Chief Complaint:   Chief Complaint   Patient presents with   • Difficulty Swallowing     Patient states that he couldn't swallow without getting sick.         Patient ID: Jonas Snow is a 77 y.o. male     History of Present Illness:  This is a very pleasant 77-year-old male who was referred to our office from [facility] with complaints of difficulty swallowing and vomiting.  The patient tells me that he has begun to have difficulty swallowing for 2-3 months now.  He states that he has difficulty swallowing his pills as well as solids.  He states that he tries to eat more soft than liquid food.  He states that it feels as though it gets stuck in the mid esophageal area.  The patient states that approximately 2 weeks ago he did vomit up blood twice.  He describes it as bright red.  He denies any recent nosebleeds prior to that.  The patient does.  A history of GERD and has had Schatzki's ring in the past.    The patient denies any epigastric pain, odynophagia, pyrosis.  He denies any fever or chills.  He denies any melena or hematochezia.  The patient does also carry a history of colon cancer with metastases to the liver he [facility]  He denies any unintentional weight loss or loss of appetite.  He states that he is unsure of what meds he is taking.  He states that he thinks he is taking a \"stomach pill\" but is not sure.  He denies any chronic NSAID use.      On 11/18/14 the patient underwent an upper endoscopy for complaints of dysphagia with findings of benign appearing intrinsic moderate stenosis 9 mm in diameter found at the GE junction that was dilated.  On 12/19/14 the patient again underwent an upper endoscopy for dysphagia with findings of a non-obstructing Schatzki's ring that was dilated at that GE junction.    Past Medical History:   Diagnosis Date   • Cancer     Colon/Liver   • Colon cancer    • GERD (gastroesophageal reflux disease)    • Hiatal hernia    • " Liver disease    • Peptic stricture of esophagus    • Schatzki's ring    • Vitamin D deficiency        Past Surgical History:   Procedure Laterality Date   • COLONOSCOPY N/A 1/6/2017    Procedure: COLONOSCOPY WITH ANESTHESIA;  Surgeon: Haydee Krishnamurthy MD;  Location: [facility] ENDOSCOPY;  Service:    • ENDOSCOPY  12/19/2014   • TONSILLECTOMY     • VENOUS ACCESS DEVICE (PORT) INSERTION           Current Outpatient Prescriptions:   •  aspirin 81 MG EC tablet, Take 81 mg by mouth Daily., Disp: , Rfl:   •  aspirin  MG tablet, Take 325 mg by mouth Every 6 (Six) Hours As Needed. Take 1/2 a pill daily, Disp: , Rfl:   •  cholecalciferol (VITAMIN D3) 1000 UNITS tablet, Take 1,000 Units by mouth Daily., Disp: , Rfl:   •  HYDROcodone-acetaminophen (NORCO) 7.5-325 MG per tablet, Take 1 tablet by mouth Every 4 (Four) Hours As Needed for Moderate Pain (4-6)., Disp: 20 tablet, Rfl: 0  •  NON FORMULARY, Pill for prostate, Disp: , Rfl:   •  omeprazole (priLOSEC) 20 MG capsule, Take 1 capsule by mouth 2 (Two) Times a Day., Disp: 60 capsule, Rfl: 11  •  ondansetron ODT (ZOFRAN-ODT) 4 MG disintegrating tablet, Take 1 tablet by mouth Every 4 (Four) Hours As Needed for Nausea or Vomiting., Disp: 10 tablet, Rfl: 0  •  sucralfate (CARAFATE) 1 GM/10ML suspension, Take 10 mL by mouth 4 (Four) Times a Day., Disp: 1 each, Rfl: 1    No Known Allergies    Social History     Social History   • Marital status:      Spouse name: N/A   • Number of children: N/A   • Years of education: N/A     Occupational History   • Not on file.     Social History Main Topics   • Smoking status: Former Smoker     Quit date: 1/6/2009   • Smokeless tobacco: Never Used   • Alcohol use Yes      Comment: occ   • Drug use: No   • Sexual activity: Not on file     Other Topics Concern   • Not on file     Social History Narrative       Family History   Problem Relation Age of Onset   • Colon cancer Neg Hx    • Colon polyps Neg Hx    • Esophageal cancer Neg Hx    •  "Liver cancer Neg Hx    • Liver disease Neg Hx    • Rectal cancer Neg Hx    • Stomach cancer Neg Hx        Vitals:    10/04/17 0857   BP: 142/76   Pulse: 73   Temp: 97.3 °F (36.3 °C)   SpO2: 99%   Weight: 160 lb (72.6 kg)   Height: 69\" (175.3 cm)       Review of Systems:    General:    Present -feeling well   Skin:    Not Present-Rash   HEENT:     Not Present-Acute visual changes or Acute hearing changes   Neck :    Not Present- swollen glands   Genitourinary:      Not Present- burning, frequency, urgency hematuria, dysuria,   Cardiovascular:   Not Present-chest pain, palpitations, or pressure   Respiratory:   Not Present- shortness of breath or cough   Gastrointestinal:  Musculoskeletal:  Neurological:  Psychiatric:   Present as mentioned in the HP    Not Present. Recent gait disturbances.    Not Present-Seizures and weakness in extremities.    Not Present- Anxiety or Depression.       Physical Exam:    General Appearance:    Alert, cooperative, in no acute distress   Psych:    Mood appropriate    Eyes:          conjunctivae and sclerae normal, no   icterus, no pallor   ENMT:    Ears appear intact with no abnormalities noted oral mucosa moist   Neck:   No adenopathy, supple, trachea midline, no thyromegaly, no   carotid bruit, no JVD    Cardiovascular:    Regular rhythm and normal rate, normal S1 and S2, no            murmur, no gallop, no rub, no click   Gastrointestinal:     Inspection normal.  Normal bowel sounds, no masses, no organomegaly, soft round non-tender, non-distended, no guarding, no rebound or tenderness. No hepatosplenomegaly.   Skin:   No bleeding, bruising or rash   Neurologic:   nonfocal       Lab Results   Component Value Date    WBC 13.08 (H) 06/24/2017    HGB 11.6 (L) 06/24/2017    HCT 37.8 (L) 06/24/2017     06/24/2017        Lab Results   Component Value Date     06/24/2017    K 4.2 06/24/2017     06/24/2017    CO2 24.0 06/24/2017    BUN 17 06/24/2017    CREATININE 0.82 " 06/24/2017    BILITOT 2.2 (H) 06/24/2017    ALKPHOS 128 (H) 06/24/2017    ALT 35 06/24/2017    AST 39 06/24/2017    GLUCOSE 114 (H) 06/24/2017       No results found for: INR    Assessment and Plan:    Jonas was seen today for difficulty swallowing.    Diagnoses and all orders for this visit:    Other dysphagia  -     Case Request; Standing  -     Case Request EGD    Hematemesis without nausea  -     Case Request; Standing  -     Case Request    Gastroesophageal reflux disease without esophagitis  -     Case Request; Standing  -     Case Request    Schatzki's ring  -     Case Request; Standing  -     Case Request    Other specified carcinomas of liver  Currently being treated with chemotherapy     Malignant neoplasm of colon, unspecified part of colon    Other orders  -     sucralfate (CARAFATE) 1 GM/10ML suspension; Take 10 mL by mouth 4 (Four) Times a Day.  -     omeprazole (priLOSEC) 20 MG capsule; Take 1 capsule by mouth 2 (Two) Times a Day.  -     Implement Anesthesia Orders Day of Procedure; Standing  -     Obtain Informed Consent; Standing      I have scheduled the patient for an EGD.  I have ordered Carafate suspension as the patient states he has a very difficult time swallowing pills or solids.  I will also increase his Prilosec to 20 mg however he is not sure if he has been taking any of this medication.    Next follow-up appointment    The risks, benefits, and alternatives of endoscopy were reviewed with the patient today.  Risks including perforation, with or without dilation, possibly requiring surgery.  Additional risks include risk of bleeding.  There is also the risk of a drug reaction or problems with anesthesia.  This will be discussed with the further by the anesthesia team on the day of the procedure. The benefits include the diagnosis and management of disease of the upper digestive tract.  Alternatives to endoscopy include upper GI series, laboratory testing, radiographic evaluation, or no  intervention.  The patient verbalizes understanding and agrees.      EMR Dragon/Transcription disclaimer:  Much of this encounter note is an electronic transcription/translation of spoken language to printed text. The electronic translation of spoken language may permit erroneous, or at times, nonsensical words or phrases to be inadvertently transcribed; although I have reviewed the note for such errors, some may still exist.

## 2017-10-05 NOTE — ANESTHESIA POSTPROCEDURE EVALUATION
Patient: Jonas Snow    Procedure Summary     Date Anesthesia Start Anesthesia Stop Room / Location    10/05/17 0744 0813 [facility] ENDOSCOPY 2 / [facility]       Procedure Diagnosis Surgeon Provider    ESOPHAGOGASTRODUODENOSCOPY WITH ANESTHESIA (N/A Esophagus) Schatzki's ring; Other dysphagia; Gastroesophageal reflux disease without esophagitis; Hematemesis without nausea  (Schatzki's ring [K22.2]; Other dysphagia [R13.19]; Gastroesophageal reflux disease without esophagitis [K21.9]; Hematemesis without nausea [K92.0]) MD Ricky Ch CRNA          Anesthesia Type: general  Last vitals  BP   142/73 (10/05/17 0830)    Temp        Pulse   63 (10/05/17 0830)   Resp   13 (10/05/17 0830)    SpO2   100 % (10/05/17 0830)      Post Anesthesia Care and Evaluation    Patient location during evaluation: PHASE II  Patient participation: complete - patient participated  Level of consciousness: awake and alert  Pain score: 0  Pain management: adequate  Airway patency: patent  Anesthetic complications: No anesthetic complications  PONV Status: none  Cardiovascular status: acceptable and stable  Respiratory status: acceptable  Hydration status: acceptable

## 2017-10-05 NOTE — ANESTHESIA PREPROCEDURE EVALUATION
Anesthesia Evaluation     Patient summary reviewed   no history of anesthetic complications:  NPO Solid Status: > 8 hours  NPO Liquid Status: > 8 hours     Airway   Mallampati: II  TM distance: >3 FB  Neck ROM: full  no difficulty expected  Dental      Pulmonary    (+) a smoker Former,   (-) shortness of breath, sleep apnea  Cardiovascular   Exercise tolerance: good (4-7 METS)    (-) hypertension, past MI, cardiac stents      Neuro/Psych  (-) seizures, TIA, CVA  GI/Hepatic/Renal/Endo    (+)  hiatal hernia, GERD, liver disease,     ROS Comment: Esophageal stricture      Colon cancer with mets to liver    Musculoskeletal     Abdominal    Substance History      OB/GYN          Other      history of cancer active                                    Anesthesia Plan    ASA 3     general     intravenous induction   Anesthetic plan and risks discussed with patient.

## 2017-10-05 NOTE — PLAN OF CARE
Problem: Patient Care Overview (Adult)  Goal: Plan of Care Review  Outcome: Ongoing (interventions implemented as appropriate)    10/05/17 0761   Coping/Psychosocial Response Interventions   Plan Of Care Reviewed With patient   Patient Care Overview   Progress no change   Outcome Evaluation   Outcome Summary/Follow up Plan tolerating well         Problem: GI Endoscopy (Adult)  Goal: Signs and Symptoms of Listed Potential Problems Will be Absent or Manageable (GI Endoscopy)  Outcome: Ongoing (interventions implemented as appropriate)

## 2017-10-05 NOTE — PLAN OF CARE
Problem: Patient Care Overview (Adult)  Goal: Plan of Care Review  Outcome: Outcome(s) achieved Date Met:  10/05/17    10/05/17 0815   Coping/Psychosocial Response Interventions   Plan Of Care Reviewed With patient;family   Patient Care Overview   Progress improving   Outcome Evaluation   Outcome Summary/Follow up Plan discharge criteria met

## 2017-10-05 NOTE — PLAN OF CARE
Problem: Patient Care Overview (Adult)  Goal: Adult Individualization and Mutuality  Outcome: Ongoing (interventions implemented as appropriate)    10/05/17 0657   Individualization   Patient Specific Preferences none

## 2017-10-05 NOTE — PLAN OF CARE
Problem: GI Endoscopy (Adult)  Goal: Signs and Symptoms of Listed Potential Problems Will be Absent or Manageable (GI Endoscopy)  Outcome: Outcome(s) achieved Date Met:  10/05/17    10/05/17 0815   GI Endoscopy   Problems Assessed (GI Endoscopy) all   Problems Present (GI Endoscopy) none

## 2017-10-09 LAB
CYTO UR: NORMAL
LAB AP CASE REPORT: NORMAL
LAB AP CLINICAL INFORMATION: NORMAL
Lab: NORMAL
PATH REPORT.FINAL DX SPEC: NORMAL
PATH REPORT.GROSS SPEC: NORMAL

## 2017-10-10 ENCOUNTER — TELEPHONE (OUTPATIENT)
Dept: GASTROENTEROLOGY | Facility: CLINIC | Age: 77
End: 2017-10-10

## 2017-10-10 NOTE — TELEPHONE ENCOUNTER
Please inform patient that his esophageal biopsies only show inflammation.  There is no cancer or precancer seen.  Continue to take medications as I prescribed after the endoscopy.  We will repeat endoscopy as planned.    Haydee Krishnamurthy MD

## 2017-10-19 ENCOUNTER — TELEPHONE (OUTPATIENT)
Dept: GASTROENTEROLOGY | Facility: CLINIC | Age: 77
End: 2017-10-19

## 2017-10-19 ENCOUNTER — PREP FOR SURGERY (OUTPATIENT)
Dept: OTHER | Facility: HOSPITAL | Age: 77
End: 2017-10-19

## 2017-10-19 DIAGNOSIS — R13.19 OTHER DYSPHAGIA: ICD-10-CM

## 2017-10-19 DIAGNOSIS — K22.2 SCHATZKI'S RING: Primary | ICD-10-CM

## 2017-10-19 DIAGNOSIS — K92.0 HEMATEMESIS WITHOUT NAUSEA: ICD-10-CM

## 2017-11-02 ENCOUNTER — HOSPITAL ENCOUNTER (OUTPATIENT)
Facility: HOSPITAL | Age: 77
Setting detail: HOSPITAL OUTPATIENT SURGERY
Discharge: HOME | End: 2017-11-02
Attending: INTERNAL MEDICINE | Admitting: INTERNAL MEDICINE

## 2017-11-02 ENCOUNTER — ANESTHESIA (OUTPATIENT)
Dept: GASTROENTEROLOGY | Facility: HOSPITAL | Age: 77
End: 2017-11-02

## 2017-11-02 ENCOUNTER — ANESTHESIA EVENT (OUTPATIENT)
Dept: GASTROENTEROLOGY | Facility: HOSPITAL | Age: 77
End: 2017-11-02

## 2017-11-02 VITALS
RESPIRATION RATE: 17 BRPM | BODY MASS INDEX: 25.18 KG/M2 | OXYGEN SATURATION: 100 % | HEART RATE: 69 BPM | SYSTOLIC BLOOD PRESSURE: 150 MMHG | WEIGHT: 170 LBS | TEMPERATURE: 97.8 F | DIASTOLIC BLOOD PRESSURE: 70 MMHG | HEIGHT: 69 IN

## 2017-11-02 DIAGNOSIS — K22.2 SCHATZKI'S RING: ICD-10-CM

## 2017-11-02 DIAGNOSIS — K92.0 HEMATEMESIS WITHOUT NAUSEA: ICD-10-CM

## 2017-11-02 DIAGNOSIS — R13.19 OTHER DYSPHAGIA: ICD-10-CM

## 2017-11-02 PROCEDURE — 88305 TISSUE EXAM BY PATHOLOGIST: CPT | Performed by: INTERNAL MEDICINE

## 2017-11-02 PROCEDURE — 25010000002 PROPOFOL 10 MG/ML EMULSION: Performed by: NURSE ANESTHETIST, CERTIFIED REGISTERED

## 2017-11-02 PROCEDURE — 43239 EGD BIOPSY SINGLE/MULTIPLE: CPT | Performed by: INTERNAL MEDICINE

## 2017-11-02 RX ORDER — LIDOCAINE HYDROCHLORIDE 20 MG/ML
INJECTION, SOLUTION INFILTRATION; PERINEURAL AS NEEDED
Status: DISCONTINUED | OUTPATIENT
Start: 2017-11-02 | End: 2017-11-02 | Stop reason: SURG

## 2017-11-02 RX ORDER — SODIUM CHLORIDE 0.9 % (FLUSH) 0.9 %
3 SYRINGE (ML) INJECTION AS NEEDED
Status: DISCONTINUED | OUTPATIENT
Start: 2017-11-02 | End: 2017-11-02 | Stop reason: HOSPADM

## 2017-11-02 RX ORDER — SODIUM CHLORIDE 9 MG/ML
500 INJECTION, SOLUTION INTRAVENOUS CONTINUOUS PRN
Status: DISCONTINUED | OUTPATIENT
Start: 2017-11-02 | End: 2017-11-02 | Stop reason: HOSPADM

## 2017-11-02 RX ORDER — PROPOFOL 10 MG/ML
VIAL (ML) INTRAVENOUS AS NEEDED
Status: DISCONTINUED | OUTPATIENT
Start: 2017-11-02 | End: 2017-11-02 | Stop reason: SURG

## 2017-11-02 RX ORDER — TAMSULOSIN HYDROCHLORIDE 0.4 MG/1
1 CAPSULE ORAL NIGHTLY
COMMUNITY

## 2017-11-02 RX ADMIN — LIDOCAINE HYDROCHLORIDE 100 MG: 20 INJECTION, SOLUTION INFILTRATION; PERINEURAL at 11:07

## 2017-11-02 RX ADMIN — SODIUM CHLORIDE 500 ML: 9 INJECTION, SOLUTION INTRAVENOUS at 08:42

## 2017-11-02 RX ADMIN — SODIUM CHLORIDE: 9 INJECTION, SOLUTION INTRAVENOUS at 11:02

## 2017-11-02 RX ADMIN — PROPOFOL 200 MG: 10 INJECTION, EMULSION INTRAVENOUS at 11:07

## 2017-11-02 NOTE — ANESTHESIA POSTPROCEDURE EVALUATION
"Patient: Jonas Snow    Procedure Summary     Date Anesthesia Start Anesthesia Stop Room / Location    11/02/17 1102 1118  [facility] END[facility]COPY 6 /  [facility]       Procedure Diagnosis Surgeon Provider    ESOPHAGOGASTRODUODENOSCOPY WITH ANESTHESIA (N/A Esophagus) Schatzki's ring; Other dysphagia; Hematemesis without nausea  (Schatzki's ring [K22.2]; Other dysphagia [R13.19]; Hematemesis without nausea [K92.0]) MD Lazarus Ch CRNA          Anesthesia Type: general  Last vitals  BP   148/69 (11/02/17 0825)   Temp   97.8 °F (36.6 °C) (11/02/17 0825)   Pulse   77 (11/02/17 0825)   Resp   20 (11/02/17 0825)     SpO2   99 % (11/02/17 0825)     Post Anesthesia Care and Evaluation    Patient location during evaluation: PACU  Patient participation: complete - patient participated  Level of consciousness: awake and alert  Pain score: 1  Pain management: adequate  Airway patency: patent  Anesthetic complications: No anesthetic complications    Cardiovascular status: acceptable  Respiratory status: room air  Hydration status: acceptable    Blood pressure 148/69, pulse 77, temperature 97.8 °F (36.6 °C), temperature source Temporal Artery , resp. rate 20, height 69\" (175.3 cm), weight 170 lb (77.1 kg), SpO2 99 %.      "

## 2017-11-02 NOTE — H&P (VIEW-ONLY)
"Chief Complaint:   Chief Complaint   Patient presents with   • Difficulty Swallowing     Patient states that he couldn't swallow without getting sick.         Patient ID: Jonas Snow is a 77 y.o. male     History of Present Illness:  This is a very pleasant 77-year-old male who was referred to our office from [facility] with complaints of difficulty swallowing and vomiting.  The patient tells me that he has begun to have difficulty swallowing for 2-3 months now.  He states that he has difficulty swallowing his pills as well as solids.  He states that he tries to eat more soft than liquid food.  He states that it feels as though it gets stuck in the mid esophageal area.  The patient states that approximately 2 weeks ago he did vomit up blood twice.  He describes it as bright red.  He denies any recent nosebleeds prior to that.  The patient does.  A history of GERD and has had Schatzki's ring in the past.    The patient denies any epigastric pain, odynophagia, pyrosis.  He denies any fever or chills.  He denies any melena or hematochezia.  The patient does also carry a history of colon cancer with metastases to the liver he is [facility].  He denies any unintentional weight loss or loss of appetite.  He states that he is unsure of what meds he is taking.  He states that he thinks he is taking a \"stomach pill\" but is not sure.  He denies any chronic NSAID use.      On 11/18/14 the patient underwent an upper endoscopy for complaints of dysphagia with findings of benign appearing intrinsic moderate stenosis 9 mm in diameter found at the GE junction that was dilated.  On 12/19/14 the patient again underwent an upper endoscopy for dysphagia with findings of a non-obstructing Schatzki's ring that was dilated at that GE junction.    Past Medical History:   Diagnosis Date   • Cancer     Colon/Liver   • Colon cancer    • GERD (gastroesophageal reflux disease)    • Hiatal hernia    • " Liver disease    • Peptic stricture of esophagus    • Schatzki's ring    • Vitamin D deficiency        Past Surgical History:   Procedure Laterality Date   • COLONOSCOPY N/A 1/6/2017    Procedure: COLONOSCOPY WITH ANESTHESIA;  Surgeon: Haydee Krishnamurthy MD;  Location: [facility] ENDOSCOPY;  Service:    • ENDOSCOPY  12/19/2014   • TONSILLECTOMY     • VENOUS ACCESS DEVICE (PORT) INSERTION           Current Outpatient Prescriptions:   •  aspirin 81 MG EC tablet, Take 81 mg by mouth Daily., Disp: , Rfl:   •  aspirin  MG tablet, Take 325 mg by mouth Every 6 (Six) Hours As Needed. Take 1/2 a pill daily, Disp: , Rfl:   •  cholecalciferol (VITAMIN D3) 1000 UNITS tablet, Take 1,000 Units by mouth Daily., Disp: , Rfl:   •  HYDROcodone-acetaminophen (NORCO) 7.5-325 MG per tablet, Take 1 tablet by mouth Every 4 (Four) Hours As Needed for Moderate Pain (4-6)., Disp: 20 tablet, Rfl: 0  •  NON FORMULARY, Pill for prostate, Disp: , Rfl:   •  omeprazole (priLOSEC) 20 MG capsule, Take 1 capsule by mouth 2 (Two) Times a Day., Disp: 60 capsule, Rfl: 11  •  ondansetron ODT (ZOFRAN-ODT) 4 MG disintegrating tablet, Take 1 tablet by mouth Every 4 (Four) Hours As Needed for Nausea or Vomiting., Disp: 10 tablet, Rfl: 0  •  sucralfate (CARAFATE) 1 GM/10ML suspension, Take 10 mL by mouth 4 (Four) Times a Day., Disp: 1 each, Rfl: 1    No Known Allergies    Social History     Social History   • Marital status:      Spouse name: N/A   • Number of children: N/A   • Years of education: N/A     Occupational History   • Not on file.     Social History Main Topics   • Smoking status: Former Smoker     Quit date: 1/6/2009   • Smokeless tobacco: Never Used   • Alcohol use Yes      Comment: occ   • Drug use: No   • Sexual activity: Not on file     Other Topics Concern   • Not on file     Social History Narrative       Family History   Problem Relation Age of Onset   • Colon cancer Neg Hx    • Colon polyps Neg Hx    • Esophageal cancer Neg Hx    •  "Liver cancer Neg Hx    • Liver disease Neg Hx    • Rectal cancer Neg Hx    • Stomach cancer Neg Hx        Vitals:    10/04/17 0857   BP: 142/76   Pulse: 73   Temp: 97.3 °F (36.3 °C)   SpO2: 99%   Weight: 160 lb (72.6 kg)   Height: 69\" (175.3 cm)       Review of Systems:    General:    Present -feeling well   Skin:    Not Present-Rash   HEENT:     Not Present-Acute visual changes or Acute hearing changes   Neck :    Not Present- swollen glands   Genitourinary:      Not Present- burning, frequency, urgency hematuria, dysuria,   Cardiovascular:   Not Present-chest pain, palpitations, or pressure   Respiratory:   Not Present- shortness of breath or cough   Gastrointestinal:  Musculoskeletal:  Neurological:  Psychiatric:   Present as mentioned in the HP    Not Present. Recent gait disturbances.    Not Present-Seizures and weakness in extremities.    Not Present- Anxiety or Depression.       Physical Exam:    General Appearance:    Alert, cooperative, in no acute distress   Psych:    Mood appropriate    Eyes:          conjunctivae and sclerae normal, no   icterus, no pallor   ENMT:    Ears appear intact with no abnormalities noted oral mucosa moist   Neck:   No adenopathy, supple, trachea midline, no thyromegaly, no   carotid bruit, no JVD    Cardiovascular:    Regular rhythm and normal rate, normal S1 and S2, no            murmur, no gallop, no rub, no click   Gastrointestinal:     Inspection normal.  Normal bowel sounds, no masses, no organomegaly, soft round non-tender, non-distended, no guarding, no rebound or tenderness. No hepatosplenomegaly.   Skin:   No bleeding, bruising or rash   Neurologic:   nonfocal       Lab Results   Component Value Date    WBC 13.08 (H) 06/24/2017    HGB 11.6 (L) 06/24/2017    HCT 37.8 (L) 06/24/2017     06/24/2017        Lab Results   Component Value Date     06/24/2017    K 4.2 06/24/2017     06/24/2017    CO2 24.0 06/24/2017    BUN 17 06/24/2017    CREATININE 0.82 " 06/24/2017    BILITOT 2.2 (H) 06/24/2017    ALKPHOS 128 (H) 06/24/2017    ALT 35 06/24/2017    AST 39 06/24/2017    GLUCOSE 114 (H) 06/24/2017       No results found for: INR    Assessment and Plan:    Jonas was seen today for difficulty swallowing.    Diagnoses and all orders for this visit:    Other dysphagia  -     Case Request; Standing  -     Case Request EGD    Hematemesis without nausea  -     Case Request; Standing  -     Case Request    Gastroesophageal reflux disease without esophagitis  -     Case Request; Standing  -     Case Request    Schatzki's ring  -     Case Request; Standing  -     Case Request    Other specified carcinomas of liver  Currently being treated with chemotherapy     Malignant neoplasm of colon, unspecified part of colon    Other orders  -     sucralfate (CARAFATE) 1 GM/10ML suspension; Take 10 mL by mouth 4 (Four) Times a Day.  -     omeprazole (priLOSEC) 20 MG capsule; Take 1 capsule by mouth 2 (Two) Times a Day.  -     Implement Anesthesia Orders Day of Procedure; Standing  -     Obtain Informed Consent; Standing      I have scheduled the patient for an EGD.  I have ordered Carafate suspension as the patient states he has a very difficult time swallowing pills or solids.  I will also increase his Prilosec to 20 mg however he is not sure if he has been taking any of this medication.    Next follow-up appointment    The risks, benefits, and alternatives of endoscopy were reviewed with the patient today.  Risks including perforation, with or without dilation, possibly requiring surgery.  Additional risks include risk of bleeding.  There is also the risk of a drug reaction or problems with anesthesia.  This will be discussed with the further by the anesthesia team on the day of the procedure. The benefits include the diagnosis and management of disease of the upper digestive tract.  Alternatives to endoscopy include upper GI series, laboratory testing, radiographic evaluation, or no  intervention.  The patient verbalizes understanding and agrees.      EMR Dragon/Transcription disclaimer:  Much of this encounter note is an electronic transcription/translation of spoken language to printed text. The electronic translation of spoken language may permit erroneous, or at times, nonsensical words or phrases to be inadvertently transcribed; although I have reviewed the note for such errors, some may still exist.

## 2017-11-02 NOTE — PLAN OF CARE
Problem: GI Endoscopy (Adult)  Goal: Signs and Symptoms of Listed Potential Problems Will be Absent or Manageable (GI Endoscopy)  Outcome: Outcome(s) achieved Date Met:  11/02/17 11/02/17 1152   GI Endoscopy   Problems Assessed (GI Endoscopy) all   Problems Present (GI Endoscopy) none

## 2017-11-02 NOTE — PLAN OF CARE
Problem: Patient Care Overview (Adult)  Goal: Plan of Care Review  Outcome: Ongoing (interventions implemented as appropriate)    11/02/17 1110   Patient Care Overview   Progress improving   Outcome Evaluation   Outcome Summary/Follow up Plan no noted problems

## 2017-11-02 NOTE — ANESTHESIA PREPROCEDURE EVALUATION
Anesthesia Evaluation     Patient summary reviewed   no history of anesthetic complications:  NPO Solid Status: > 8 hours       Airway   Mallampati: II  TM distance: >3 FB  Neck ROM: full  Dental      Pulmonary    (+) a smoker Former,   (-) shortness of breath, sleep apnea  Cardiovascular   Exercise tolerance: good (4-7 METS)    (-) hypertension, past MI, cardiac stents      Neuro/Psych  (-) seizures, TIA, CVA  GI/Hepatic/Renal/Endo    (+)  hiatal hernia, GERD, liver disease,     ROS Comment: Esophageal stricture      Colon cancer with mets to liver    Musculoskeletal     Abdominal    Substance History      OB/GYN          Other      history of cancer active                                    Anesthesia Plan    ASA 3     general     intravenous induction   Anesthetic plan and risks discussed with patient.

## 2017-11-03 LAB
CYTO UR: NORMAL
LAB AP CASE REPORT: NORMAL
LAB AP CLINICAL INFORMATION: NORMAL
LAB AP INTRADEPARTMENTAL CONSULT: NORMAL
Lab: NORMAL
PATH REPORT.FINAL DX SPEC: NORMAL
PATH REPORT.GROSS SPEC: NORMAL

## 2017-11-06 ENCOUNTER — TELEPHONE (OUTPATIENT)
Dept: GASTROENTEROLOGY | Facility: CLINIC | Age: 77
End: 2017-11-06

## 2017-11-06 ENCOUNTER — PREP FOR SURGERY (OUTPATIENT)
Dept: OTHER | Facility: HOSPITAL | Age: 77
End: 2017-11-06

## 2017-11-06 DIAGNOSIS — K22.2 ESOPHAGEAL STRICTURE: Primary | ICD-10-CM

## 2017-11-06 NOTE — TELEPHONE ENCOUNTER
Please inform pt that endoscopy biopsies did not have cancer.  Please take medications as prescribed. Repeat endoscopy in the next 4-6 weeks. Case request entered.    Haydee Krishnamurthy MD

## 2017-11-07 NOTE — TELEPHONE ENCOUNTER
I have been unable to reach him re: these results so I have mailed him a letter explaining all of this and I asked him to call back as soon as he receives the letter so I can set up repeat endo (which I have tentatively scheduled for 12/11 at 7:00 am).

## 2017-11-15 PROBLEM — K22.2 ESOPHAGEAL STRICTURE: Status: ACTIVE | Noted: 2017-11-15

## 2017-12-11 ENCOUNTER — ANESTHESIA EVENT (OUTPATIENT)
Dept: GASTROENTEROLOGY | Facility: HOSPITAL | Age: 77
End: 2017-12-11

## 2017-12-11 ENCOUNTER — HOSPITAL ENCOUNTER (OUTPATIENT)
Facility: HOSPITAL | Age: 77
Setting detail: HOSPITAL OUTPATIENT SURGERY
Discharge: HOME | End: 2017-12-11
Attending: INTERNAL MEDICINE | Admitting: INTERNAL MEDICINE

## 2017-12-11 ENCOUNTER — ANESTHESIA (OUTPATIENT)
Dept: GASTROENTEROLOGY | Facility: HOSPITAL | Age: 77
End: 2017-12-11

## 2017-12-11 VITALS
BODY MASS INDEX: 27.97 KG/M2 | WEIGHT: 174 LBS | HEART RATE: 63 BPM | DIASTOLIC BLOOD PRESSURE: 77 MMHG | SYSTOLIC BLOOD PRESSURE: 131 MMHG | RESPIRATION RATE: 17 BRPM | OXYGEN SATURATION: 91 % | TEMPERATURE: 98 F | HEIGHT: 66 IN

## 2017-12-11 DIAGNOSIS — K22.2 SCHATZKI'S RING: Primary | ICD-10-CM

## 2017-12-11 PROCEDURE — 43249 ESOPH EGD DILATION <30 MM: CPT | Performed by: INTERNAL MEDICINE

## 2017-12-11 PROCEDURE — 25010000002 PROPOFOL 10 MG/ML EMULSION: Performed by: NURSE ANESTHETIST, CERTIFIED REGISTERED

## 2017-12-11 PROCEDURE — C1726 CATH, BAL DIL, NON-VASCULAR: HCPCS | Performed by: INTERNAL MEDICINE

## 2017-12-11 RX ORDER — SODIUM CHLORIDE 9 MG/ML
500 INJECTION, SOLUTION INTRAVENOUS CONTINUOUS PRN
Status: DISCONTINUED | OUTPATIENT
Start: 2017-12-11 | End: 2017-12-11 | Stop reason: HOSPADM

## 2017-12-11 RX ORDER — SODIUM CHLORIDE 0.9 % (FLUSH) 0.9 %
3 SYRINGE (ML) INJECTION AS NEEDED
Status: DISCONTINUED | OUTPATIENT
Start: 2017-12-11 | End: 2017-12-11 | Stop reason: HOSPADM

## 2017-12-11 RX ORDER — LIDOCAINE HYDROCHLORIDE 20 MG/ML
INJECTION, SOLUTION INFILTRATION; PERINEURAL AS NEEDED
Status: DISCONTINUED | OUTPATIENT
Start: 2017-12-11 | End: 2017-12-11 | Stop reason: SURG

## 2017-12-11 RX ORDER — PROPOFOL 10 MG/ML
VIAL (ML) INTRAVENOUS AS NEEDED
Status: DISCONTINUED | OUTPATIENT
Start: 2017-12-11 | End: 2017-12-11 | Stop reason: SURG

## 2017-12-11 RX ADMIN — PROPOFOL 80 MG: 10 INJECTION, EMULSION INTRAVENOUS at 08:53

## 2017-12-11 RX ADMIN — SODIUM CHLORIDE 500 ML: 9 INJECTION, SOLUTION INTRAVENOUS at 07:45

## 2017-12-11 RX ADMIN — LIDOCAINE HYDROCHLORIDE 50 MG: 20 INJECTION, SOLUTION INFILTRATION; PERINEURAL at 08:53

## 2017-12-11 NOTE — H&P
Chief Complaint:   Dysphagia    Subjective     HPI:   He is an esophageal stricture that has been dilated.  Biopsies from his last endoscopy did not show any cancer.  He is taking medications now as directed.    Past Medical History:   Past Medical History:   Diagnosis Date   • Cancer     Colon/Liver   • Colon cancer    • GERD (gastroesophageal reflux disease)    • Hiatal hernia    • Liver disease    • Peptic stricture of esophagus    • Schatzki's ring    • Vitamin D deficiency        Past Surgical History:  [unfilled]    Family History:  Family History   Problem Relation Age of Onset   • Colon cancer Neg Hx    • Colon polyps Neg Hx    • Esophageal cancer Neg Hx    • Liver cancer Neg Hx    • Liver disease Neg Hx    • Rectal cancer Neg Hx    • Stomach cancer Neg Hx        Social History:   reports that he quit smoking about 8 years ago. He has never used smokeless tobacco. He reports that he drinks alcohol. He reports that he does not use illicit drugs.    Medications:   Prescriptions Prior to Admission   Medication Sig Dispense Refill Last Dose   • aspirin  MG tablet Take 325 mg by mouth Every 6 (Six) Hours As Needed. Take 1/2 a pill daily   12/10/2017 at Unknown time   • cholecalciferol (VITAMIN D3) 1000 UNITS tablet Take 1,000 Units by mouth Daily.   12/10/2017 at Unknown time   • FERROUS GLUCONATE IRON PO Take  by mouth.   12/10/2017 at Unknown time   • ondansetron ODT (ZOFRAN-ODT) 4 MG disintegrating tablet Take 1 tablet by mouth Every 4 (Four) Hours As Needed for Nausea or Vomiting. 10 tablet 0 Past Month at Unknown time   • pantoprazole (PROTONIX) 40 MG EC tablet Take 1 tablet by mouth 2 (Two) Times a Day Before Meals. 60 tablet 11 12/10/2017 at Unknown time   • sucralfate (CARAFATE) 1 GM/10ML suspension Take 10 mL by mouth 4 (Four) Times a Day. 1 each 1 12/10/2017 at Unknown time   • tamsulosin (FLOMAX) 0.4 MG capsule 24 hr capsule Take 1 capsule by mouth Every Night.   12/10/2017 at Unknown time   •  "HYDROcodone-acetaminophen (NORCO) 7.5-325 MG per tablet Take 1 tablet by mouth Every 4 (Four) Hours As Needed for Moderate Pain (4-6). 20 tablet 0 More than a month at Unknown time   • NON FORMULARY Pill for prostate   10/4/2017 at Unknown time       Allergies:  Review of patient's allergies indicates no known allergies.    ROS:    General: Weight stable  Resp: No SOA  Cardiovascular: No CP      Objective     /74 (Patient Position: Sitting)  Pulse 69  Temp 98 °F (36.7 °C) (Temporal Artery )   Resp 20  Ht 167.6 cm (66\")  Wt 78.9 kg (174 lb)  SpO2 98%  BMI 28.08 kg/m2    Physical Exam   Constitutional: Pt is oriented to person, place, and in no distress.  Eyes: No icterus  ENMT: Unremarkable   Cardiovascular: Normal rate, regular rhythm.    Pulmonary/Chest: No distress.  No audible wheezes  Abdominal: Soft.   Skin: Skin is warm and dry.   Psychiatric: Mood, memory, affect and judgment appear normal.     Assessment/Plan     Diagnosis:  Dysphagia    Anticipated Surgical Procedure:  Endoscopy    The risks, benefits, and alternatives of endoscopy were reviewed with the patient today.  Risks including perforation, with or without dilation, possibly requiring surgery.  Additional risks include risk of bleeding.  There is also the risk of a drug reaction or problems with anesthesia.  This will be discussed with the further by the anesthesia team on the day of the procedure. The benefits include the diagnosis and management of disease of the upper digestive tract.  Alternatives to endoscopy include upper GI series, laboratory testing, radiographic evaluation, or no intervention.  The patient verbalizes understanding and agrees.    Much of this encounter note is an electronic transcription/translation of spoken language to printed text. The electronic translation of spoken language may permit erroneous, or at times, nonsensical words or phrases to be inadvertently transcribed; although I have reviewed the note for " such errors, some may still exist.

## 2017-12-11 NOTE — ANESTHESIA POSTPROCEDURE EVALUATION
Patient: Jonas Snow    Procedure Summary     Date Anesthesia Start Anesthesia Stop Room / Location    12/11/17 0850 0903 [facility] ENDOSCOPY 4 / [facility]       Procedure Diagnosis Surgeon Provider    ESOPHAGOGASTRODUODENOSCOPY WITH ANESTHESIA (N/A Esophagus) Esophageal stricture  (Esophageal stricture [K22.2]) MD Enzo Ch CRNA          Anesthesia Type: general  Last vitals  BP   129/72 (12/11/17 0905)   Temp   98 °F (36.7 °C) (12/11/17 0733)   Pulse   67 (12/11/17 0905)   Resp   20 (12/11/17 0905)     SpO2   94 % (12/11/17 0905)     Post Anesthesia Care and Evaluation    Patient location during evaluation: PHASE II  Level of consciousness: awake and alert  Pain management: adequate  Airway patency: patent  Anesthetic complications: No anesthetic complications    Cardiovascular status: acceptable  Respiratory status: acceptable  Hydration status: acceptable

## 2017-12-11 NOTE — PLAN OF CARE
Problem: GI Endoscopy (Adult)  Goal: Signs and Symptoms of Listed Potential Problems Will be Absent or Manageable (GI Endoscopy)  Outcome: Outcome(s) achieved Date Met:  12/11/17

## 2017-12-11 NOTE — PLAN OF CARE
Problem: Patient Care Overview (Adult)  Goal: Plan of Care Review  Outcome: Outcome(s) achieved Date Met:  12/11/17 12/11/17 0908   Coping/Psychosocial Response Interventions   Plan Of Care Reviewed With patient   Patient Care Overview   Progress improving   Outcome Evaluation   Outcome Summary/Follow up Plan D/C CRITERIA MET

## 2017-12-11 NOTE — PLAN OF CARE
Problem: Patient Care Overview (Adult)  Goal: Adult Individualization and Mutuality    12/11/17 0731   Individualization   Patient Specific Preferences rebekah

## 2017-12-11 NOTE — PLAN OF CARE
Problem: Patient Care Overview (Adult)  Goal: Plan of Care Review  Outcome: Ongoing (interventions implemented as appropriate)    12/11/17 0852   Coping/Psychosocial Response Interventions   Plan Of Care Reviewed With patient   Patient Care Overview   Progress no change   Outcome Evaluation   Outcome Summary/Follow up Plan pt tolerated procedure well.

## 2018-01-18 ENCOUNTER — TELEPHONE (OUTPATIENT)
Dept: GASTROENTEROLOGY | Facility: CLINIC | Age: 78
End: 2018-01-18

## 2018-01-18 NOTE — TELEPHONE ENCOUNTER
Pamela from Dr. Marley's office at [facility] called to let you know that patient is on a chemo drug called Avastin.     She knows that patient is on the schedule in the morning for endoscopy with dilation and wanted to make you aware that this drug can cause bleeding.     If you want to discuss this with Dr. Marley or have questions, you can call her at [phone] ext [phone]

## 2018-01-19 ENCOUNTER — ANESTHESIA (OUTPATIENT)
Dept: GASTROENTEROLOGY | Facility: HOSPITAL | Age: 78
End: 2018-01-19

## 2018-01-19 ENCOUNTER — ANESTHESIA EVENT (OUTPATIENT)
Dept: GASTROENTEROLOGY | Facility: HOSPITAL | Age: 78
End: 2018-01-19

## 2018-01-19 ENCOUNTER — HOSPITAL ENCOUNTER (OUTPATIENT)
Facility: HOSPITAL | Age: 78
Setting detail: HOSPITAL OUTPATIENT SURGERY
Discharge: HOME | End: 2018-01-19
Attending: INTERNAL MEDICINE | Admitting: INTERNAL MEDICINE

## 2018-01-19 VITALS
RESPIRATION RATE: 18 BRPM | WEIGHT: 175 LBS | HEART RATE: 68 BPM | OXYGEN SATURATION: 100 % | DIASTOLIC BLOOD PRESSURE: 60 MMHG | HEIGHT: 67 IN | TEMPERATURE: 97.5 F | BODY MASS INDEX: 27.47 KG/M2 | SYSTOLIC BLOOD PRESSURE: 140 MMHG

## 2018-01-19 PROBLEM — K22.2 ESOPHAGEAL STRICTURE: Status: ACTIVE | Noted: 2017-11-15

## 2018-01-19 PROBLEM — K92.0 HEMATEMESIS WITHOUT NAUSEA: Status: RESOLVED | Noted: 2017-10-04 | Resolved: 2018-01-19

## 2018-01-19 PROBLEM — K92.0 VOMITING BLOOD: Status: RESOLVED | Noted: 2017-10-04 | Resolved: 2018-01-19

## 2018-01-19 PROCEDURE — 43249 ESOPH EGD DILATION <30 MM: CPT | Performed by: INTERNAL MEDICINE

## 2018-01-19 PROCEDURE — C1726 CATH, BAL DIL, NON-VASCULAR: HCPCS | Performed by: INTERNAL MEDICINE

## 2018-01-19 PROCEDURE — 25010000002 PROPOFOL 10 MG/ML EMULSION: Performed by: NURSE ANESTHETIST, CERTIFIED REGISTERED

## 2018-01-19 RX ORDER — SODIUM CHLORIDE 0.9 % (FLUSH) 0.9 %
1-10 SYRINGE (ML) INJECTION AS NEEDED
Status: DISCONTINUED | OUTPATIENT
Start: 2018-01-19 | End: 2018-01-19 | Stop reason: HOSPADM

## 2018-01-19 RX ORDER — SODIUM CHLORIDE 9 MG/ML
100 INJECTION, SOLUTION INTRAVENOUS CONTINUOUS
Status: DISCONTINUED | OUTPATIENT
Start: 2018-01-19 | End: 2018-01-19 | Stop reason: HOSPADM

## 2018-01-19 RX ORDER — PROPOFOL 10 MG/ML
VIAL (ML) INTRAVENOUS AS NEEDED
Status: DISCONTINUED | OUTPATIENT
Start: 2018-01-19 | End: 2018-01-19 | Stop reason: SURG

## 2018-01-19 RX ORDER — LIDOCAINE HYDROCHLORIDE 20 MG/ML
INJECTION, SOLUTION INFILTRATION; PERINEURAL AS NEEDED
Status: DISCONTINUED | OUTPATIENT
Start: 2018-01-19 | End: 2018-01-19 | Stop reason: SURG

## 2018-01-19 RX ADMIN — PROPOFOL 50 MG: 10 INJECTION, EMULSION INTRAVENOUS at 10:31

## 2018-01-19 RX ADMIN — PROPOFOL 10 MG: 10 INJECTION, EMULSION INTRAVENOUS at 10:37

## 2018-01-19 RX ADMIN — PROPOFOL 20 MG: 10 INJECTION, EMULSION INTRAVENOUS at 10:36

## 2018-01-19 RX ADMIN — SODIUM CHLORIDE 100 ML/HR: 9 INJECTION, SOLUTION INTRAVENOUS at 07:49

## 2018-01-19 RX ADMIN — LIDOCAINE HYDROCHLORIDE 50 MG: 20 INJECTION, SOLUTION INFILTRATION; PERINEURAL at 10:31

## 2018-01-19 RX ADMIN — PROPOFOL 50 MG: 10 INJECTION, EMULSION INTRAVENOUS at 10:33

## 2018-01-19 RX ADMIN — PROPOFOL 20 MG: 10 INJECTION, EMULSION INTRAVENOUS at 10:39

## 2018-01-19 NOTE — ANESTHESIA PREPROCEDURE EVALUATION
Anesthesia Evaluation     Patient summary reviewed and Nursing notes reviewed   no history of anesthetic complications:  NPO Solid Status: > 8 hours  NPO Liquid Status: > 2 hours     Airway   Mallampati: II  TM distance: >3 FB  Neck ROM: full  Dental      Pulmonary    (+) a smoker Former,   (-) shortness of breath, sleep apnea  Cardiovascular   Exercise tolerance: good (4-7 METS)    (-) hypertension, past MI, cardiac stents      Neuro/Psych  (-) seizures, TIA, CVA  GI/Hepatic/Renal/Endo    (+)  hiatal hernia, GERD, liver disease,     ROS Comment: Esophageal stricture      Colon cancer with mets to liver    Musculoskeletal     Abdominal    Substance History      OB/GYN          Other      history of cancer (colon/liver) active                                          Anesthesia Plan    ASA 3     general     intravenous induction   Anesthetic plan and risks discussed with patient.

## 2018-01-19 NOTE — PLAN OF CARE
Problem: Patient Care Overview (Adult)  Goal: Plan of Care Review  Outcome: Ongoing (interventions implemented as appropriate)   01/19/18 1036   Coping/Psychosocial Response Interventions   Plan Of Care Reviewed With patient   Patient Care Overview   Progress progress toward functional goals as expected   Outcome Evaluation   Outcome Summary/Follow up Plan tolerated procedure well

## 2018-01-19 NOTE — PLAN OF CARE
Problem: GI Endoscopy (Adult)  Goal: Signs and Symptoms of Listed Potential Problems Will be Absent or Manageable (GI Endoscopy)  Outcome: Ongoing (interventions implemented as appropriate)   01/19/18 1036   GI Endoscopy   Problems Assessed (GI Endoscopy) all   Problems Present (GI Endoscopy) none

## 2018-01-19 NOTE — PLAN OF CARE
Problem: Patient Care Overview (Adult)  Goal: Plan of Care Review  Outcome: Outcome(s) achieved Date Met: 01/19/18 01/19/18 1054   Coping/Psychosocial Response Interventions   Plan Of Care Reviewed With patient   Patient Care Overview   Progress improving   Outcome Evaluation   Outcome Summary/Follow up Plan Discharge criteria met.

## 2018-01-19 NOTE — H&P
Chief Complaint:   Esophageal stricture    Subjective     HPI:   He has had esophageal stricture that I been dilating.  The last dilation to place 12/11/2017 and I got him up to 12 mm size.  He tells me he is feeling much better and has no difficulty swallowing foods at this point.  He is receiving a chemotherapy which according to [facility] with some increased risk for bleeding complications.    Past Medical History:   Past Medical History:   Diagnosis Date   • Cancer     Colon/Liver   • Colon cancer    • GERD (gastroesophageal reflux disease)    • Hiatal hernia    • Liver disease    • Peptic stricture of esophagus    • Schatzki's ring    • Vitamin D deficiency        Past Surgical History:  [unfilled]    Family History:  Family History   Problem Relation Age of Onset   • Colon cancer Neg Hx    • Colon polyps Neg Hx    • Esophageal cancer Neg Hx    • Liver cancer Neg Hx    • Liver disease Neg Hx    • Rectal cancer Neg Hx    • Stomach cancer Neg Hx        Social History:   reports that he quit smoking about 9 years ago. He has never used smokeless tobacco. He reports that he drinks alcohol. He reports that he does not use illicit drugs.    Medications:   Prescriptions Prior to Admission   Medication Sig Dispense Refill Last Dose   • aspirin  MG tablet Take 325 mg by mouth Every 6 (Six) Hours As Needed. Take 1/2 a pill daily   1/18/2018 at 0800   • cholecalciferol (VITAMIN D3) 1000 UNITS tablet Take 1,000 Units by mouth Daily.   1/18/2018 at Unknown time   • FERROUS GLUCONATE IRON PO Take  by mouth.   1/17/2018 at Unknown time   • pantoprazole (PROTONIX) 40 MG EC tablet Take 1 tablet by mouth 2 (Two) Times a Day Before Meals. 60 tablet 11 1/18/2018 at Unknown time   • sucralfate (CARAFATE) 1 GM/10ML suspension Take 10 mL by mouth 4 (Four) Times a Day. 1 each 1 1/18/2018 at Unknown time   • tamsulosin (FLOMAX) 0.4 MG capsule 24 hr capsule Take 1 capsule by mouth Every Night.   1/18/2018 at Unknown time  "  • HYDROcodone-acetaminophen (NORCO) 7.5-325 MG per tablet Take 1 tablet by mouth Every 4 (Four) Hours As Needed for Moderate Pain (4-6). 20 tablet 0 More than a month at Unknown time   • NON FORMULARY Pill for prostate   10/4/2017 at Unknown time   • ondansetron ODT (ZOFRAN-ODT) 4 MG disintegrating tablet Take 1 tablet by mouth Every 4 (Four) Hours As Needed for Nausea or Vomiting. 10 tablet 0 More than a month at Unknown time       Allergies:  Review of patient's allergies indicates no known allergies.    ROS:    General: Weight stable  Resp: No SOA  Cardiovascular: No CP      Objective     /76 (Patient Position: Sitting)  Pulse 60  Temp 97.5 °F (36.4 °C) (Temporal Artery )   Resp 20  Ht 170.2 cm (67\")  Wt 79.4 kg (175 lb)  SpO2 99%  BMI 27.41 kg/m2    Physical Exam   Constitutional: Pt is oriented to person, place, and in no distress.  Eyes: No icterus  ENMT: Unremarkable   Cardiovascular: Normal rate, regular rhythm.    Pulmonary/Chest: No distress.  No audible wheezes  Abdominal: Soft.   Skin: Skin is warm and dry.   Psychiatric: Mood, memory, affect and judgment appear normal.     Assessment/Plan     Diagnosis:  Esophageal stricture    Anticipated Surgical Procedure:  Endoscopy    The risks, benefits, and alternatives of endoscopy were reviewed with the patient today.  Risks including perforation, with or without dilation, possibly requiring surgery.  Additional risks include risk of bleeding.  There is also the risk of a drug reaction or problems with anesthesia.  This will be discussed with the further by the anesthesia team on the day of the procedure. The benefits include the diagnosis and management of disease of the upper digestive tract.  Alternatives to endoscopy include upper GI series, laboratory testing, radiographic evaluation, or no intervention.  The patient verbalizes understanding and agrees.    Much of this encounter note is an electronic transcription/translation of spoken " language to printed text. The electronic translation of spoken language may permit erroneous, or at times, nonsensical words or phrases to be inadvertently transcribed; although I have reviewed the note for such errors, some may still exist.

## 2018-01-19 NOTE — PLAN OF CARE
Problem: Patient Care Overview (Adult)  Goal: Adult Individualization and Mutuality   01/19/18 0730   Individualization   Patient Specific Preferences none

## 2018-01-19 NOTE — ANESTHESIA POSTPROCEDURE EVALUATION
Patient: Jonas Snow    Procedure Summary     Date Anesthesia Start Anesthesia Stop Room / Location    01/19/18 1017 1039  [facility] 6 /  [facility]       Procedure Diagnosis Surgeon Provider    ESOPHAGOGASTRODUODENOSCOPY WITH ANESTHESIA (N/A Esophagus) Schatzki's ring  (Schatzki's ring [K22.2]) MD Raiza Ch CRNA          Anesthesia Type: general  Last vitals  BP   174/76 (01/19/18 0737)   Temp   97.5 °F (36.4 °C) (01/19/18 0737)   Pulse   60 (01/19/18 0737)   Resp   20 (01/19/18 0737)     SpO2   99 % (01/19/18 0737)     Post Anesthesia Care and Evaluation    Patient location during evaluation: PHASE II  Patient participation: complete - patient participated  Level of consciousness: awake and alert  Pain score: 0  Pain management: adequate  Airway patency: patent  Anesthetic complications: No anesthetic complications    Cardiovascular status: acceptable and stable  Respiratory status: acceptable and unassisted  Hydration status: stable

## 2018-01-24 ENCOUNTER — TELEPHONE (OUTPATIENT)
Dept: GASTROENTEROLOGY | Facility: CLINIC | Age: 78
End: 2018-01-24

## 2018-08-06 ENCOUNTER — TELEPHONE (OUTPATIENT)
Dept: UROLOGY | Facility: CLINIC | Age: 78
End: 2018-08-06

## 2018-08-06 NOTE — TELEPHONE ENCOUNTER
----- Message from Braulio Palacios MD sent at 8/5/2018  3:47 PM CDT -----  No thank you  ----- Message -----  From: Haydee Benavides RegSched Rep  Sent: 8/2/2018  12:31 PM  To: Braulio Palacios MD    [facility] PT RECORDS   PLEASE REVIEW.

## 2018-08-29 ENCOUNTER — OFFICE VISIT (OUTPATIENT)
Dept: GASTROENTEROLOGY | Facility: CLINIC | Age: 78
End: 2018-08-29

## 2018-08-29 VITALS
HEART RATE: 62 BPM | DIASTOLIC BLOOD PRESSURE: 76 MMHG | WEIGHT: 152 LBS | OXYGEN SATURATION: 99 % | TEMPERATURE: 97.5 F | HEIGHT: 67 IN | BODY MASS INDEX: 23.86 KG/M2 | SYSTOLIC BLOOD PRESSURE: 124 MMHG

## 2018-08-29 DIAGNOSIS — R13.19 OTHER DYSPHAGIA: Primary | ICD-10-CM

## 2018-08-29 DIAGNOSIS — K22.2 ESOPHAGEAL STRICTURE: ICD-10-CM

## 2018-08-29 DIAGNOSIS — R63.4 WEIGHT LOSS: ICD-10-CM

## 2018-08-29 DIAGNOSIS — K22.2 SCHATZKI'S RING: ICD-10-CM

## 2018-08-29 PROCEDURE — 99213 OFFICE O/P EST LOW 20 MIN: CPT | Performed by: NURSE PRACTITIONER

## 2018-08-29 RX ORDER — OXYBUTYNIN CHLORIDE 10 MG/1
10 TABLET, EXTENDED RELEASE ORAL DAILY
COMMUNITY
End: 2019-10-15

## 2018-08-29 RX ORDER — SUCRALFATE ORAL 1 G/10ML
1 SUSPENSION ORAL 4 TIMES DAILY
Status: ON HOLD | COMMUNITY
End: 2018-09-28

## 2018-08-29 RX ORDER — RANITIDINE 300 MG/1
300 CAPSULE ORAL 2 TIMES DAILY
COMMUNITY
End: 2019-09-04 | Stop reason: HOSPADM

## 2018-08-29 NOTE — PROGRESS NOTES
"Chief Complaint:   Chief Complaint   Patient presents with   • Difficulty Swallowing     Patient is here today states that he can't swallow his pills.         Patient ID: Jonas Snow is a 78 y.o. male     History of Present Illness: This is a very pleasant 78-year-old male who comes today with complaints of dysphagia.  The patient has had a history of this in the past.  The patient tells me that most recently this has worsened and he feels that the place where food gets \"stuck is in my lower esophagus.\"  The patient states many times he has to vomit in order to expel solids or pills.  He states that he feels as though he is having difficulty swallowing pills as well.  He states that he has lost some weight.  On the patient's last office visit weight recorded on 1/19/18 was 175 and current weight today on 8/29/18 is 152.  The patient states that he has not lost his appetite but is afraid to eat because of food getting stuck.  He states that after his last dilation it seemed to work for a little while.  Of note the patient does carry a history of liver cancer and has been being treated with chemotherapy at [facility].    The patient denies any nausea, epigastric pain,  pyrosis or hematemesis.  The patient denies any fever or chills.  Denies any melena or hematochezia.      The patient's last EGD was performed on 1/19/18 for dysphagia (Dysphagia, he has had esophageal stricture been dilating. His last dilation was December 2017 and  this was up to 12 mm balloon. I have been notified by his oncologist at the [facility] that the patient is now receiving the chemotherapy that increases his bleeding risk.) (Noted per Dr. Krishnamurthy on date of procedure)      EGD 1/19/18  A medium-sized hiatal hernia was present.  A non-obstructing Schatzki ring (acquired) was found at the gastroesophageal junction. A TTS dilator was passed through the scope. Dilation with a 12-13.5-15 mm balloon dilator was performed to 12 mm and 13.5 " mm.  The stomach was normal.  The cardia and gastric fundus were normal on retroflexion.  The examined duodenum was normal.    Past Medical History:   Diagnosis Date   • Cancer (CMS/HCC)     Colon/Liver   • Colon cancer (CMS/HCC)    • GERD (gastroesophageal reflux disease)    • Hiatal hernia    • Liver disease    • Peptic stricture of esophagus    • Schatzki's ring    • Vitamin D deficiency        Past Surgical History:   Procedure Laterality Date   • COLONOSCOPY N/A 1/6/2017    Procedure: COLONOSCOPY WITH ANESTHESIA;  Surgeon: Haydee Krishnamurthy MD;  Location: [facility] ENDOSCOPY;  Service:    • ENDOSCOPY  12/19/2014   • ENDOSCOPY N/A 10/5/2017    Procedure: ESOPHAGOGASTRODUODENOSCOPY WITH ANESTHESIA;  Surgeon: Haydee Krishnamurthy MD;  Location: [facility] ENDOSCOPY;  Service:    • ENDOSCOPY N/A 11/2/2017    Procedure: ESOPHAGOGASTRODUODENOSCOPY WITH ANESTHESIA;  Surgeon: Haydee Krishnamurthy MD;  Location: [facility] ENDOSCOPY;  Service:    • ENDOSCOPY N/A 12/11/2017    Procedure: ESOPHAGOGASTRODUODENOSCOPY WITH ANESTHESIA;  Surgeon: Haydee Krishnamurthy MD;  Location: [facility] ENDOSCOPY;  Service:    • ENDOSCOPY N/A 1/19/2018    Procedure: ESOPHAGOGASTRODUODENOSCOPY WITH ANESTHESIA;  Surgeon: Haydee Krishnamurthy MD;  Location: [facility] ENDOSCOPY;  Service:    • TONSILLECTOMY     • VENOUS ACCESS DEVICE (PORT) INSERTION           Current Outpatient Prescriptions:   •  aspirin  MG tablet, Take 325 mg by mouth Every 6 (Six) Hours As Needed. Take 1/2 a pill daily, Disp: , Rfl:   •  cholecalciferol (VITAMIN D3) 1000 UNITS tablet, Take 1,000 Units by mouth Daily., Disp: , Rfl:   •  FERROUS GLUCONATE IRON PO, Take  by mouth., Disp: , Rfl:   •  HYDROcodone-acetaminophen (NORCO) 7.5-325 MG per tablet, Take 1 tablet by mouth Every 4 (Four) Hours As Needed for Moderate Pain (4-6)., Disp: 20 tablet, Rfl: 0  •  NON FORMULARY, Pill for prostate, Disp: , Rfl:   •  ondansetron ODT (ZOFRAN-ODT) 4 MG disintegrating tablet, Take 1 tablet by mouth Every 4 (Four) Hours  "As Needed for Nausea or Vomiting., Disp: 10 tablet, Rfl: 0  •  oxybutynin XL (DITROPAN-XL) 10 MG 24 hr tablet, Take 10 mg by mouth Daily., Disp: , Rfl:   •  pantoprazole (PROTONIX) 40 MG EC tablet, Take 1 tablet by mouth 2 (Two) Times a Day Before Meals., Disp: 60 tablet, Rfl: 11  •  ranitidine (ZANTAC) 300 MG capsule, Take 300 mg by mouth Every Evening., Disp: , Rfl:   •  sucralfate (CARAFATE) 1 GM/10ML suspension, Take 1 g by mouth 4 (Four) Times a Day., Disp: , Rfl:   •  tamsulosin (FLOMAX) 0.4 MG capsule 24 hr capsule, Take 1 capsule by mouth Every Night., Disp: , Rfl:     No Known Allergies    Social History     Social History   • Marital status:      Spouse name: N/A   • Number of children: N/A   • Years of education: N/A     Occupational History   • Not on file.     Social History Main Topics   • Smoking status: Former Smoker     Quit date: 1/6/2009   • Smokeless tobacco: Never Used   • Alcohol use Yes      Comment: occ   • Drug use: No   • Sexual activity: Not on file     Other Topics Concern   • Not on file     Social History Narrative   • No narrative on file       Family History   Problem Relation Age of Onset   • Colon cancer Neg Hx    • Colon polyps Neg Hx    • Esophageal cancer Neg Hx    • Liver cancer Neg Hx    • Liver disease Neg Hx    • Rectal cancer Neg Hx    • Stomach cancer Neg Hx        Vitals:    08/29/18 0930   BP: 124/76   Pulse: 62   Temp: 97.5 °F (36.4 °C)   SpO2: 99%   Weight: 68.9 kg (152 lb)   Height: 170.2 cm (67\")       Review of Systems:    General:    Present -feeling well   Skin:    Not Present-Rash   HEENT:     Not Present-Acute visual changes or Acute hearing changes   Neck :    Not Present- swollen glands   Genitourinary:      Not Present- burning, frequency, urgency hematuria, dysuria,   Cardiovascular:   Not Present-chest pain, palpitations, or pressure   Respiratory:   Not Present- shortness of breath or cough "   Gastrointestinal:  Musculoskeletal:  Neurological:  Psychiatric:   Present as mentioned in the HP    Not Present. Recent gait disturbances.    Not Present-Seizures and weakness in extremities.    Not Present- Anxiety or Depression.       Physical Exam:    General Appearance:    Alert, cooperative, in no acute distress   Psych:    Mood appropriate    Eyes:          conjunctivae and sclerae normal, no   icterus, no pallor   ENMT:    Ears appear intact with no abnormalities noted oral mucosa moist   Neck:   No adenopathy, supple, trachea midline, no thyromegaly, no   carotid bruit, no JVD    Cardiovascular:    Regular rhythm and normal rate, normal S1 and S2, no            murmur, no gallop, no rub, no click   Gastrointestinal:     Inspection normal.  Normal bowel sounds, no masses, no organomegaly, soft round non-tender, non-distended, no guarding, no rebound or tenderness. No hepatosplenomegaly.   Skin:   No bleeding, bruising or rash   Neurologic:   nonfocal       Lab Results   Component Value Date    WBC 13.08 (H) 06/24/2017    HGB 11.6 (L) 06/24/2017    HCT 37.8 (L) 06/24/2017     06/24/2017        Lab Results   Component Value Date     06/24/2017    K 4.2 06/24/2017     06/24/2017    CO2 24.0 06/24/2017    BUN 17 06/24/2017    CREATININE 0.82 06/24/2017    BILITOT 2.2 (H) 06/24/2017    ALKPHOS 128 (H) 06/24/2017    ALT 35 06/24/2017    AST 39 06/24/2017    GLUCOSE 114 (H) 06/24/2017       No results found for: INR    Body mass index is 23.81 kg/m². Patient's Body mass index is 23.81 kg/m². BMI is below normal parameters. Recommendations include: Will check esophagram may follow up with EGD.    Assessment and Plan:  Assessment/Plan   Jonas was seen today for difficulty swallowing.    Diagnoses and all orders for this visit:    Other dysphagia  Comments:  I have scheduled the patient for an esophagram and depending on those findings we may proceed to EGD.  Dr. Krishnamurthy had concern on his last  dilation as the patient is on chemotherapy and is a higher risk for bleeding.  Orders:  -     FL Esophagram Complete; Future    Schatzki's ring  Comments:  history of  Orders:  -     FL Esophagram Complete; Future    Esophageal stricture  Comments:  history of  Orders:  -     FL Esophagram Complete; Future    Weight loss  Comments:  due to not being able to swallow.  Weight loss of approximately 23 pounds since last office visit.       There are no Patient Instructions on file for this visit.    Next follow-up appointment          EMR Dragon/Transcription disclaimer:  Much of this encounter note is an electronic transcription/translation of spoken language to printed text. The electronic translation of spoken language may permit erroneous, or at times, nonsensical words or phrases to be inadvertently transcribed; although I have reviewed the note for such errors, some may still exist.

## 2018-08-30 ENCOUNTER — PREP FOR SURGERY (OUTPATIENT)
Dept: OTHER | Facility: HOSPITAL | Age: 78
End: 2018-08-30

## 2018-08-30 ENCOUNTER — HOSPITAL ENCOUNTER (OUTPATIENT)
Dept: GENERAL RADIOLOGY | Facility: HOSPITAL | Age: 78
Discharge: HOME | End: 2018-08-30
Admitting: NURSE PRACTITIONER

## 2018-08-30 ENCOUNTER — TELEPHONE (OUTPATIENT)
Dept: GASTROENTEROLOGY | Facility: CLINIC | Age: 78
End: 2018-08-30

## 2018-08-30 DIAGNOSIS — K22.2 STRICTURE OF ESOPHAGUS: ICD-10-CM

## 2018-08-30 DIAGNOSIS — R63.4 WEIGHT LOSS: ICD-10-CM

## 2018-08-30 DIAGNOSIS — K22.2 ESOPHAGEAL STRICTURE: ICD-10-CM

## 2018-08-30 DIAGNOSIS — K22.2 SCHATZKI'S RING: ICD-10-CM

## 2018-08-30 DIAGNOSIS — R13.19 OTHER DYSPHAGIA: ICD-10-CM

## 2018-08-30 DIAGNOSIS — R13.10 DYSPHAGIA, UNSPECIFIED TYPE: Primary | ICD-10-CM

## 2018-08-30 PROCEDURE — 74220 X-RAY XM ESOPHAGUS 1CNTRST: CPT

## 2018-08-30 RX ADMIN — BARIUM SULFATE 355 ML: 0.6 SUSPENSION ORAL at 08:35

## 2018-08-30 RX ADMIN — BARIUM SULFATE 700 MG: 700 TABLET ORAL at 08:35

## 2018-08-30 RX ADMIN — BARIUM SULFATE 120 ML: 980 POWDER, FOR SUSPENSION ORAL at 08:35

## 2018-08-30 NOTE — TELEPHONE ENCOUNTER
This is the [facility] that we discussed other day who is having difficulty swallowing and an EGD to review the esophagram.

## 2018-08-30 NOTE — TELEPHONE ENCOUNTER
Okay to add him on for endoscopy with dilation assuming he is not receiving chemotherapy that increases bleeding tendency.    Haydee Krishnamurthy MD

## 2018-08-30 NOTE — TELEPHONE ENCOUNTER
I have added him in to tomorrow's schedule because I have no openings prior to next chemo treatment which is on 9/11/18. Please let me know when you have signed the pended endoscopy order sets.

## 2018-08-30 NOTE — TELEPHONE ENCOUNTER
----- Message from KRISTIAN Haynes sent at 8/30/2018  1:45 PM CDT -----  Ivory I had Dr. Krishnamurthy review this esophagram and we would like to add him to the schedule for an EGD.  If he will please give him a call.  Make sure he has not had any chemotherapy as it increases his risk for bleeding.  When I saw him last he was not able to do his last treatment so possibly we could get him in for his next treatment.

## 2018-08-31 ENCOUNTER — ANESTHESIA (OUTPATIENT)
Dept: GASTROENTEROLOGY | Facility: HOSPITAL | Age: 78
End: 2018-08-31

## 2018-08-31 ENCOUNTER — ANESTHESIA EVENT (OUTPATIENT)
Dept: GASTROENTEROLOGY | Facility: HOSPITAL | Age: 78
End: 2018-08-31

## 2018-08-31 ENCOUNTER — HOSPITAL ENCOUNTER (OUTPATIENT)
Facility: HOSPITAL | Age: 78
Setting detail: HOSPITAL OUTPATIENT SURGERY
Discharge: HOME | End: 2018-08-31
Attending: INTERNAL MEDICINE | Admitting: INTERNAL MEDICINE

## 2018-08-31 VITALS
RESPIRATION RATE: 10 BRPM | HEIGHT: 66 IN | OXYGEN SATURATION: 99 % | TEMPERATURE: 98 F | DIASTOLIC BLOOD PRESSURE: 64 MMHG | HEART RATE: 62 BPM | SYSTOLIC BLOOD PRESSURE: 155 MMHG | WEIGHT: 149 LBS | BODY MASS INDEX: 23.95 KG/M2

## 2018-08-31 DIAGNOSIS — R13.10 DYSPHAGIA, UNSPECIFIED TYPE: ICD-10-CM

## 2018-08-31 DIAGNOSIS — K22.2 ESOPHAGEAL STRICTURE: ICD-10-CM

## 2018-08-31 DIAGNOSIS — R63.4 WEIGHT LOSS: ICD-10-CM

## 2018-08-31 PROCEDURE — 43248 EGD GUIDE WIRE INSERTION: CPT | Performed by: INTERNAL MEDICINE

## 2018-08-31 PROCEDURE — 25010000002 PROPOFOL 10 MG/ML EMULSION: Performed by: NURSE ANESTHETIST, CERTIFIED REGISTERED

## 2018-08-31 RX ORDER — PROPOFOL 10 MG/ML
VIAL (ML) INTRAVENOUS AS NEEDED
Status: DISCONTINUED | OUTPATIENT
Start: 2018-08-31 | End: 2018-08-31 | Stop reason: SURG

## 2018-08-31 RX ORDER — SODIUM CHLORIDE 9 MG/ML
100 INJECTION, SOLUTION INTRAVENOUS CONTINUOUS
Status: DISCONTINUED | OUTPATIENT
Start: 2018-08-31 | End: 2018-08-31 | Stop reason: HOSPADM

## 2018-08-31 RX ORDER — SODIUM CHLORIDE 0.9 % (FLUSH) 0.9 %
1-10 SYRINGE (ML) INJECTION AS NEEDED
Status: DISCONTINUED | OUTPATIENT
Start: 2018-08-31 | End: 2018-08-31 | Stop reason: HOSPADM

## 2018-08-31 RX ORDER — LIDOCAINE HYDROCHLORIDE 20 MG/ML
INJECTION, SOLUTION INFILTRATION; PERINEURAL AS NEEDED
Status: DISCONTINUED | OUTPATIENT
Start: 2018-08-31 | End: 2018-08-31 | Stop reason: SURG

## 2018-08-31 RX ADMIN — LIDOCAINE HYDROCHLORIDE 0.5 ML: 10 INJECTION, SOLUTION EPIDURAL; INFILTRATION; INTRACAUDAL; PERINEURAL at 12:30

## 2018-08-31 RX ADMIN — LIDOCAINE HYDROCHLORIDE 80 MG: 20 INJECTION, SOLUTION INFILTRATION; PERINEURAL at 13:57

## 2018-08-31 RX ADMIN — SODIUM CHLORIDE 100 ML/HR: 9 INJECTION, SOLUTION INTRAVENOUS at 12:30

## 2018-08-31 RX ADMIN — PROPOFOL 140 MG: 10 INJECTION, EMULSION INTRAVENOUS at 13:58

## 2018-08-31 RX ADMIN — PROPOFOL 60 MG: 10 INJECTION, EMULSION INTRAVENOUS at 13:57

## 2018-09-02 NOTE — ANESTHESIA POSTPROCEDURE EVALUATION
Patient: Jonas Snow    Procedure Summary     Date:  08/31/18 Room / Location:  [facility] ENDOSCOPY 6 / [facility] ENDOSCOPY    Anesthesia Start:  1352 Anesthesia Stop:  1421    Procedure:  ESOPHAGOGASTRODUODENOSCOPY WITH ANESTHESIA (N/A ) Diagnosis:       Esophageal stricture      Weight loss      Dysphagia, unspecified type      (Esophageal stricture [K22.2])      (Weight loss [R63.4])      (Dysphagia, unspecified type [R13.10])    Surgeon:  Haydee Krishnamurthy MD Provider:  Neal Jimenez CRNA    Anesthesia Type:  general ASA Status:  3          Anesthesia Type: general  Last vitals  BP   155/64 (08/31/18 1435)   Temp   98 °F (36.7 °C) (08/31/18 1214)   Pulse   62 (08/31/18 1435)   Resp   10 (08/31/18 1435)     SpO2   99 % (08/31/18 1435)     Post Anesthesia Care and Evaluation    Patient location during evaluation: PHASE II  Level of consciousness: awake and alert  Pain management: adequate  Airway patency: patent  Anesthetic complications: No anesthetic complications    Cardiovascular status: acceptable  Respiratory status: acceptable  Hydration status: acceptable

## 2018-09-18 ENCOUNTER — TRANSCRIBE ORDERS (OUTPATIENT)
Dept: ADMINISTRATIVE | Facility: HOSPITAL | Age: 78
End: 2018-09-18

## 2018-09-18 DIAGNOSIS — C18.8 MALIGNANT NEOPLASM OF OVERLAPPING SITES OF COLON (HCC): Primary | ICD-10-CM

## 2018-09-24 ENCOUNTER — HOSPITAL ENCOUNTER (OUTPATIENT)
Dept: CT IMAGING | Facility: HOSPITAL | Age: 78
Discharge: HOME | End: 2018-09-24

## 2018-09-24 ENCOUNTER — HOSPITAL ENCOUNTER (OUTPATIENT)
Dept: CT IMAGING | Facility: HOSPITAL | Age: 78
Discharge: HOME | End: 2018-09-24
Admitting: SPECIALIST

## 2018-09-24 DIAGNOSIS — C18.8 MALIGNANT NEOPLASM OF OVERLAPPING SITES OF COLON (HCC): ICD-10-CM

## 2018-09-24 PROCEDURE — 78815 PET IMAGE W/CT SKULL-THIGH: CPT

## 2018-09-24 PROCEDURE — 0 FLUDEOXYGLUCOSE F18 SOLUTION: Performed by: SPECIALIST

## 2018-09-24 PROCEDURE — A9552 F18 FDG: HCPCS | Performed by: SPECIALIST

## 2018-09-24 RX ADMIN — FLUDEOXYGLUCOSE F18 1 DOSE: 300 INJECTION INTRAVENOUS at 09:42

## 2018-09-28 ENCOUNTER — HOSPITAL ENCOUNTER (OUTPATIENT)
Facility: HOSPITAL | Age: 78
Setting detail: HOSPITAL OUTPATIENT SURGERY
Discharge: HOME | End: 2018-09-28
Attending: INTERNAL MEDICINE | Admitting: INTERNAL MEDICINE

## 2018-09-28 ENCOUNTER — ANESTHESIA (OUTPATIENT)
Dept: GASTROENTEROLOGY | Facility: HOSPITAL | Age: 78
End: 2018-09-28

## 2018-09-28 ENCOUNTER — ANESTHESIA EVENT (OUTPATIENT)
Dept: GASTROENTEROLOGY | Facility: HOSPITAL | Age: 78
End: 2018-09-28

## 2018-09-28 VITALS
BODY MASS INDEX: 21.48 KG/M2 | OXYGEN SATURATION: 93 % | HEIGHT: 69 IN | SYSTOLIC BLOOD PRESSURE: 131 MMHG | DIASTOLIC BLOOD PRESSURE: 66 MMHG | RESPIRATION RATE: 21 BRPM | TEMPERATURE: 97.7 F | WEIGHT: 145 LBS | HEART RATE: 63 BPM

## 2018-09-28 DIAGNOSIS — R13.19 OTHER DYSPHAGIA: ICD-10-CM

## 2018-09-28 DIAGNOSIS — K22.2 ESOPHAGEAL STRICTURE: Primary | ICD-10-CM

## 2018-09-28 PROCEDURE — 25010000002 PROPOFOL 10 MG/ML EMULSION: Performed by: NURSE ANESTHETIST, CERTIFIED REGISTERED

## 2018-09-28 PROCEDURE — 43249 ESOPH EGD DILATION <30 MM: CPT | Performed by: INTERNAL MEDICINE

## 2018-09-28 PROCEDURE — C1726 CATH, BAL DIL, NON-VASCULAR: HCPCS | Performed by: INTERNAL MEDICINE

## 2018-09-28 RX ORDER — SUCRALFATE 1 G/1
1 TABLET ORAL 4 TIMES DAILY
Qty: 360 TABLET | Refills: 0 | Status: ON HOLD | OUTPATIENT
Start: 2018-09-28 | End: 2019-09-02 | Stop reason: SDDI

## 2018-09-28 RX ORDER — SODIUM CHLORIDE 0.9 % (FLUSH) 0.9 %
3 SYRINGE (ML) INJECTION AS NEEDED
Status: DISCONTINUED | OUTPATIENT
Start: 2018-09-28 | End: 2018-09-28 | Stop reason: HOSPADM

## 2018-09-28 RX ORDER — LIDOCAINE HYDROCHLORIDE 20 MG/ML
INJECTION, SOLUTION INFILTRATION; PERINEURAL AS NEEDED
Status: DISCONTINUED | OUTPATIENT
Start: 2018-09-28 | End: 2018-09-28 | Stop reason: SURG

## 2018-09-28 RX ORDER — PROPOFOL 10 MG/ML
VIAL (ML) INTRAVENOUS AS NEEDED
Status: DISCONTINUED | OUTPATIENT
Start: 2018-09-28 | End: 2018-09-28 | Stop reason: SURG

## 2018-09-28 RX ORDER — SODIUM CHLORIDE 9 MG/ML
500 INJECTION, SOLUTION INTRAVENOUS CONTINUOUS PRN
Status: DISCONTINUED | OUTPATIENT
Start: 2018-09-28 | End: 2018-09-28 | Stop reason: HOSPADM

## 2018-09-28 RX ADMIN — SODIUM CHLORIDE 500 ML: 9 INJECTION, SOLUTION INTRAVENOUS at 12:30

## 2018-09-28 RX ADMIN — LIDOCAINE HYDROCHLORIDE 0.5 ML: 10 INJECTION, SOLUTION EPIDURAL; INFILTRATION; INTRACAUDAL; PERINEURAL at 12:30

## 2018-09-28 RX ADMIN — PROPOFOL 275 MG: 10 INJECTION, EMULSION INTRAVENOUS at 13:36

## 2018-09-28 RX ADMIN — LIDOCAINE HYDROCHLORIDE 100 MG: 20 INJECTION, SOLUTION INFILTRATION; PERINEURAL at 13:36

## 2018-09-28 NOTE — ANESTHESIA POSTPROCEDURE EVALUATION
Patient: Jonas Snow    Procedure Summary     Date:  09/28/18 Room / Location:  [facility] ENDOSCOPY 4 / [facility] ENDOSCOPY    Anesthesia Start:  1332 Anesthesia Stop:  1350    Procedure:  ESOPHAGOGASTRODUODENOSCOPY WITH ANESTHESIA (N/A ) Diagnosis:       Esophageal stricture      (Esophageal stricture [K22.2])    Surgeon:  Haydee Krishnamurthy MD Provider:  Mohsen Jason CRNA    Anesthesia Type:  general ASA Status:  3          Anesthesia Type: general  Last vitals  BP   126/60 (09/28/18 1353)   Temp   97.7 °F (36.5 °C) (09/28/18 1206)   Pulse   81 (09/28/18 1353)   Resp   17 (09/28/18 1353)     SpO2   93 % (09/28/18 1353)     Post Anesthesia Care and Evaluation    Patient location during evaluation: PACU  Patient participation: complete - patient participated  Level of consciousness: awake and alert  Pain score: 0  Pain management: adequate  Airway patency: patent  Anesthetic complications: No anesthetic complications  PONV Status: none  Cardiovascular status: hemodynamically stable and acceptable  Respiratory status: acceptable and unassisted  Hydration status: acceptable

## 2018-09-28 NOTE — ANESTHESIA PREPROCEDURE EVALUATION
Anesthesia Evaluation     Patient summary reviewed and Nursing notes reviewed   no history of anesthetic complications:  NPO Solid Status: > 8 hours  NPO Liquid Status: > 8 hours           Airway   Mallampati: II  TM distance: >3 FB  Neck ROM: full  Dental - normal exam     Pulmonary - normal exam    breath sounds clear to auscultation  (+) a smoker (quit 2009) Former,   (-) asthma, recent URI, sleep apnea  Cardiovascular - normal exam  Exercise tolerance: good (4-7 METS)    Rhythm: regular  Rate: normal    (-) pacemaker, hypertension, past MI, angina, cardiac stents, CABG      Neuro/Psych  (-) seizures, TIA, CVA  GI/Hepatic/Renal/Endo    (+)  hiatal hernia, GERD well controlled,  liver disease,   (-) no renal disease, diabetes, hypothyroidism, hyperthyroidism    ROS Comment: Dysphagia    Musculoskeletal     Abdominal    Substance History      OB/GYN          Other      history of cancer (Liver and Colon)                      Anesthesia Plan    ASA 3     general   total IV anesthesia  intravenous induction   Anesthetic plan, all risks, benefits, and alternatives have been provided, discussed and informed consent has been obtained with: patient.

## 2018-10-31 ENCOUNTER — ANESTHESIA (OUTPATIENT)
Dept: GASTROENTEROLOGY | Facility: HOSPITAL | Age: 78
End: 2018-10-31

## 2018-10-31 ENCOUNTER — ANESTHESIA EVENT (OUTPATIENT)
Dept: GASTROENTEROLOGY | Facility: HOSPITAL | Age: 78
End: 2018-10-31

## 2018-10-31 ENCOUNTER — HOSPITAL ENCOUNTER (OUTPATIENT)
Facility: HOSPITAL | Age: 78
Setting detail: HOSPITAL OUTPATIENT SURGERY
Discharge: HOME | End: 2018-10-31
Attending: INTERNAL MEDICINE | Admitting: INTERNAL MEDICINE

## 2018-10-31 VITALS
HEIGHT: 70 IN | DIASTOLIC BLOOD PRESSURE: 63 MMHG | RESPIRATION RATE: 14 BRPM | BODY MASS INDEX: 21.7 KG/M2 | OXYGEN SATURATION: 98 % | HEART RATE: 65 BPM | SYSTOLIC BLOOD PRESSURE: 129 MMHG | TEMPERATURE: 97.8 F | WEIGHT: 151.6 LBS

## 2018-10-31 PROCEDURE — C1726 CATH, BAL DIL, NON-VASCULAR: HCPCS | Performed by: INTERNAL MEDICINE

## 2018-10-31 PROCEDURE — 25010000002 PROPOFOL 10 MG/ML EMULSION: Performed by: NURSE ANESTHETIST, CERTIFIED REGISTERED

## 2018-10-31 PROCEDURE — 43249 ESOPH EGD DILATION <30 MM: CPT | Performed by: INTERNAL MEDICINE

## 2018-10-31 RX ORDER — LIDOCAINE HYDROCHLORIDE 20 MG/ML
INJECTION, SOLUTION INFILTRATION; PERINEURAL AS NEEDED
Status: DISCONTINUED | OUTPATIENT
Start: 2018-10-31 | End: 2018-10-31 | Stop reason: SURG

## 2018-10-31 RX ORDER — SODIUM CHLORIDE 9 MG/ML
500 INJECTION, SOLUTION INTRAVENOUS CONTINUOUS PRN
Status: DISCONTINUED | OUTPATIENT
Start: 2018-10-31 | End: 2018-10-31 | Stop reason: HOSPADM

## 2018-10-31 RX ORDER — SODIUM CHLORIDE 0.9 % (FLUSH) 0.9 %
3 SYRINGE (ML) INJECTION AS NEEDED
Status: DISCONTINUED | OUTPATIENT
Start: 2018-10-31 | End: 2018-10-31 | Stop reason: HOSPADM

## 2018-10-31 RX ORDER — PROPOFOL 10 MG/ML
VIAL (ML) INTRAVENOUS AS NEEDED
Status: DISCONTINUED | OUTPATIENT
Start: 2018-10-31 | End: 2018-10-31 | Stop reason: SURG

## 2018-10-31 RX ADMIN — PROPOFOL 100 MG: 10 INJECTION, EMULSION INTRAVENOUS at 13:10

## 2018-10-31 RX ADMIN — SODIUM CHLORIDE 500 ML: 9 INJECTION, SOLUTION INTRAVENOUS at 11:18

## 2018-10-31 RX ADMIN — LIDOCAINE HYDROCHLORIDE 50 MG: 20 INJECTION, SOLUTION INFILTRATION; PERINEURAL at 13:10

## 2018-10-31 RX ADMIN — PROPOFOL 50 MG: 10 INJECTION, EMULSION INTRAVENOUS at 13:15

## 2018-10-31 RX ADMIN — LIDOCAINE HYDROCHLORIDE 0.5 ML: 10 INJECTION, SOLUTION EPIDURAL; INFILTRATION; INTRACAUDAL; PERINEURAL at 11:18

## 2018-10-31 NOTE — ANESTHESIA POSTPROCEDURE EVALUATION
Patient: Jonas Snow    Procedure Summary     Date:  10/31/18 Room / Location:  [facility] ENDOSCOPY 6 / [facility]OSCOPY    Anesthesia Start:  1310 Anesthesia Stop:  1321    Procedure:  ESOPHAGOGASTRODUODENOSCOPY WITH ANESTHESIA (N/A ) Diagnosis:       Esophageal stricture      Other dysphagia      (Esophageal stricture [K22.2])      (Other dysphagia [R13.19])    Surgeon:  Haydee Krishnamurthy MD Provider:  Enzo Peraza CRNA    Anesthesia Type:  general ASA Status:  3          Anesthesia Type: general  Last vitals  BP   129/63 (10/31/18 1340)   Temp   97.8 °F (36.6 °C) (10/31/18 1059)   Pulse   65 (10/31/18 1340)   Resp   14 (10/31/18 1340)     SpO2   98 % (10/31/18 1340)     Post Anesthesia Care and Evaluation    Patient location during evaluation: PHASE II  Level of consciousness: awake and alert  Pain management: adequate  Airway patency: patent  Anesthetic complications: No anesthetic complications    Cardiovascular status: acceptable  Respiratory status: acceptable  Hydration status: acceptable

## 2018-10-31 NOTE — ANESTHESIA PREPROCEDURE EVALUATION
Anesthesia Evaluation     Patient summary reviewed and Nursing notes reviewed   no history of anesthetic complications:  NPO Solid Status: > 8 hours  NPO Liquid Status: > 8 hours           Airway   Mallampati: II  TM distance: >3 FB  Neck ROM: full  Dental - normal exam     Pulmonary - normal exam    breath sounds clear to auscultation  (+) a smoker (quit 2009) Former,   (-) asthma, recent URI, sleep apnea  Cardiovascular - normal exam  Exercise tolerance: good (4-7 METS)    Rhythm: regular  Rate: normal    (-) pacemaker, hypertension, past MI, angina, cardiac stents, CABG      Neuro/Psych  (-) seizures, TIA, CVA  GI/Hepatic/Renal/Endo    (+)  hiatal hernia, GERD well controlled,  liver disease (colon cancer mets to liver),   (-) no renal disease, diabetes, hypothyroidism, hyperthyroidism    ROS Comment: Dysphagia    Musculoskeletal     Abdominal    Substance History      OB/GYN          Other      history of cancer (Liver and Colon)                      Anesthesia Plan    ASA 3     general   total IV anesthesia  intravenous induction   Anesthetic plan, all risks, benefits, and alternatives have been provided, discussed and informed consent has been obtained with: patient.

## 2019-08-31 ENCOUNTER — HOSPITAL ENCOUNTER (INPATIENT)
Facility: HOSPITAL | Age: 79
LOS: 4 days | Discharge: HOME | End: 2019-09-04
Attending: FAMILY MEDICINE | Admitting: INTERNAL MEDICINE

## 2019-08-31 DIAGNOSIS — R13.12 OROPHARYNGEAL DYSPHAGIA: Primary | ICD-10-CM

## 2019-08-31 DIAGNOSIS — Z74.09 IMPAIRED FUNCTIONAL MOBILITY, BALANCE, GAIT, AND ENDURANCE: ICD-10-CM

## 2019-08-31 PROBLEM — R11.2 NAUSEA AND VOMITING: Status: ACTIVE | Noted: 2019-08-31

## 2019-08-31 PROCEDURE — G0378 HOSPITAL OBSERVATION PER HR: HCPCS

## 2019-08-31 PROCEDURE — 25010000002 ONDANSETRON PER 1 MG: Performed by: FAMILY MEDICINE

## 2019-08-31 RX ORDER — HYDROCODONE BITARTRATE AND ACETAMINOPHEN 7.5; 325 MG/1; MG/1
1 TABLET ORAL EVERY 6 HOURS PRN
Status: DISCONTINUED | OUTPATIENT
Start: 2019-08-31 | End: 2019-09-04 | Stop reason: HOSPADM

## 2019-08-31 RX ORDER — OXYBUTYNIN CHLORIDE 5 MG/1
10 TABLET, EXTENDED RELEASE ORAL DAILY
Status: DISCONTINUED | OUTPATIENT
Start: 2019-09-01 | End: 2019-09-04 | Stop reason: HOSPADM

## 2019-08-31 RX ORDER — FAMOTIDINE 20 MG/1
20 TABLET, FILM COATED ORAL
Status: DISCONTINUED | OUTPATIENT
Start: 2019-08-31 | End: 2019-08-31 | Stop reason: SDUPTHER

## 2019-08-31 RX ORDER — FAMOTIDINE 10 MG/ML
20 INJECTION, SOLUTION INTRAVENOUS EVERY 12 HOURS SCHEDULED
Status: DISCONTINUED | OUTPATIENT
Start: 2019-08-31 | End: 2019-09-02

## 2019-08-31 RX ORDER — ASPIRIN 325 MG
325 TABLET, DELAYED RELEASE (ENTERIC COATED) ORAL DAILY
Status: DISCONTINUED | OUTPATIENT
Start: 2019-09-01 | End: 2019-09-02

## 2019-08-31 RX ORDER — ACETAMINOPHEN 325 MG/1
650 TABLET ORAL EVERY 6 HOURS PRN
Status: DISCONTINUED | OUTPATIENT
Start: 2019-08-31 | End: 2019-09-04 | Stop reason: HOSPADM

## 2019-08-31 RX ORDER — TAMSULOSIN HYDROCHLORIDE 0.4 MG/1
0.4 CAPSULE ORAL NIGHTLY
Status: DISCONTINUED | OUTPATIENT
Start: 2019-08-31 | End: 2019-09-04 | Stop reason: HOSPADM

## 2019-08-31 RX ORDER — ONDANSETRON 2 MG/ML
4 INJECTION INTRAMUSCULAR; INTRAVENOUS EVERY 6 HOURS PRN
Status: DISCONTINUED | OUTPATIENT
Start: 2019-08-31 | End: 2019-09-04 | Stop reason: HOSPADM

## 2019-08-31 RX ORDER — SUCRALFATE 1 G/1
1 TABLET ORAL
Status: DISCONTINUED | OUTPATIENT
Start: 2019-08-31 | End: 2019-09-04 | Stop reason: HOSPADM

## 2019-08-31 RX ORDER — PANTOPRAZOLE SODIUM 40 MG/1
40 TABLET, DELAYED RELEASE ORAL
Status: DISCONTINUED | OUTPATIENT
Start: 2019-08-31 | End: 2019-09-02

## 2019-08-31 RX ORDER — SODIUM CHLORIDE 9 MG/ML
75 INJECTION, SOLUTION INTRAVENOUS CONTINUOUS
Status: DISCONTINUED | OUTPATIENT
Start: 2019-08-31 | End: 2019-09-01

## 2019-08-31 RX ADMIN — FAMOTIDINE 20 MG: 10 INJECTION INTRAVENOUS at 21:48

## 2019-08-31 RX ADMIN — ONDANSETRON HYDROCHLORIDE 4 MG: 2 SOLUTION INTRAMUSCULAR; INTRAVENOUS at 21:48

## 2019-08-31 RX ADMIN — PANTOPRAZOLE SODIUM 40 MG: 40 TABLET, DELAYED RELEASE ORAL at 20:04

## 2019-08-31 RX ADMIN — TAMSULOSIN HYDROCHLORIDE 0.4 MG: 0.4 CAPSULE ORAL at 20:04

## 2019-08-31 RX ADMIN — FAMOTIDINE 20 MG: 20 TABLET, FILM COATED ORAL at 20:04

## 2019-08-31 RX ADMIN — PHENOL 2 SPRAY: 1.5 LIQUID ORAL at 21:48

## 2019-08-31 RX ADMIN — SUCRALFATE 1 G: 1 TABLET ORAL at 20:04

## 2019-08-31 RX ADMIN — SODIUM CHLORIDE 75 ML/HR: 9 INJECTION, SOLUTION INTRAVENOUS at 20:05

## 2019-09-01 PROBLEM — D64.81 ANEMIA DUE TO CHEMOTHERAPY: Status: ACTIVE | Noted: 2019-09-01

## 2019-09-01 PROBLEM — B88.8 INFESTATION BY BED BUG: Status: ACTIVE | Noted: 2019-09-01

## 2019-09-01 PROBLEM — T45.1X5A ANEMIA DUE TO CHEMOTHERAPY: Status: ACTIVE | Noted: 2019-09-01

## 2019-09-01 PROBLEM — R11.2 CHEMOTHERAPY INDUCED NAUSEA AND VOMITING: Status: ACTIVE | Noted: 2019-09-01

## 2019-09-01 PROBLEM — R13.10 DYSPHAGIA: Status: ACTIVE | Noted: 2019-09-01

## 2019-09-01 PROBLEM — E46 PROTEIN CALORIE MALNUTRITION (HCC): Status: ACTIVE | Noted: 2019-09-01

## 2019-09-01 PROBLEM — T45.1X5A CHEMOTHERAPY INDUCED NAUSEA AND VOMITING: Status: ACTIVE | Noted: 2019-09-01

## 2019-09-01 LAB
ALBUMIN SERPL-MCNC: 3 G/DL (ref 3.5–5)
ALBUMIN/GLOB SERPL: 1.2 G/DL (ref 1.1–2.5)
ALP SERPL-CCNC: 75 U/L (ref 24–120)
ALT SERPL W P-5'-P-CCNC: 21 U/L (ref 0–54)
ANION GAP SERPL CALCULATED.3IONS-SCNC: 6 MMOL/L (ref 4–13)
AST SERPL-CCNC: 17 U/L (ref 7–45)
BASOPHILS # BLD AUTO: 0.01 10*3/MM3 (ref 0–0.2)
BASOPHILS NFR BLD AUTO: 0.1 % (ref 0–1.5)
BILIRUB SERPL-MCNC: 2 MG/DL (ref 0.1–1)
BUN BLD-MCNC: 33 MG/DL (ref 5–21)
BUN/CREAT SERPL: 48.5 (ref 7–25)
CALCIUM SPEC-SCNC: 8.5 MG/DL (ref 8.4–10.4)
CHLORIDE SERPL-SCNC: 106 MMOL/L (ref 98–110)
CO2 SERPL-SCNC: 28 MMOL/L (ref 24–31)
CREAT BLD-MCNC: 0.68 MG/DL (ref 0.5–1.4)
DEPRECATED RDW RBC AUTO: 51.1 FL (ref 37–54)
EOSINOPHIL # BLD AUTO: 0 10*3/MM3 (ref 0–0.4)
EOSINOPHIL NFR BLD AUTO: 0 % (ref 0.3–6.2)
ERYTHROCYTE [DISTWIDTH] IN BLOOD BY AUTOMATED COUNT: 19.1 % (ref 12.3–15.4)
GFR SERPL CREATININE-BSD FRML MDRD: 113 ML/MIN/1.73
GLOBULIN UR ELPH-MCNC: 2.5 GM/DL
GLUCOSE BLD-MCNC: 116 MG/DL (ref 70–100)
HCT VFR BLD AUTO: 29.4 % (ref 37.5–51)
HGB BLD-MCNC: 8.8 G/DL (ref 13–17.7)
IMM GRANULOCYTES # BLD AUTO: 0.02 10*3/MM3 (ref 0–0.05)
IMM GRANULOCYTES NFR BLD AUTO: 0.2 % (ref 0–0.5)
LYMPHOCYTES # BLD AUTO: 0.86 10*3/MM3 (ref 0.7–3.1)
LYMPHOCYTES NFR BLD AUTO: 9.3 % (ref 19.6–45.3)
MCH RBC QN AUTO: 22.5 PG (ref 26.6–33)
MCHC RBC AUTO-ENTMCNC: 29.9 G/DL (ref 31.5–35.7)
MCV RBC AUTO: 75.2 FL (ref 79–97)
MONOCYTES # BLD AUTO: 0.35 10*3/MM3 (ref 0.1–0.9)
MONOCYTES NFR BLD AUTO: 3.8 % (ref 5–12)
NEUTROPHILS # BLD AUTO: 8.01 10*3/MM3 (ref 1.7–7)
NEUTROPHILS NFR BLD AUTO: 86.6 % (ref 42.7–76)
NRBC BLD AUTO-RTO: 0 /100 WBC (ref 0–0.2)
PLATELET # BLD AUTO: 209 10*3/MM3 (ref 140–450)
PMV BLD AUTO: 9.5 FL (ref 6–12)
POTASSIUM BLD-SCNC: 4.3 MMOL/L (ref 3.5–5.3)
PROT SERPL-MCNC: 5.5 G/DL (ref 6.3–8.7)
RBC # BLD AUTO: 3.91 10*6/MM3 (ref 4.14–5.8)
SODIUM BLD-SCNC: 140 MMOL/L (ref 135–145)
WBC NRBC COR # BLD: 9.25 10*3/MM3 (ref 3.4–10.8)

## 2019-09-01 PROCEDURE — 93005 ELECTROCARDIOGRAM TRACING: CPT | Performed by: INTERNAL MEDICINE

## 2019-09-01 PROCEDURE — 25010000002 PROMETHAZINE PER 50 MG: Performed by: INTERNAL MEDICINE

## 2019-09-01 PROCEDURE — 85025 COMPLETE CBC W/AUTO DIFF WBC: CPT | Performed by: FAMILY MEDICINE

## 2019-09-01 PROCEDURE — 93010 ELECTROCARDIOGRAM REPORT: CPT | Performed by: INTERNAL MEDICINE

## 2019-09-01 PROCEDURE — 80053 COMPREHEN METABOLIC PANEL: CPT | Performed by: FAMILY MEDICINE

## 2019-09-01 PROCEDURE — 25010000002 ONDANSETRON PER 1 MG: Performed by: FAMILY MEDICINE

## 2019-09-01 PROCEDURE — 92610 EVALUATE SWALLOWING FUNCTION: CPT | Performed by: SPEECH-LANGUAGE PATHOLOGIST

## 2019-09-01 RX ORDER — PROCHLORPERAZINE EDISYLATE 5 MG/ML
5 INJECTION INTRAMUSCULAR; INTRAVENOUS EVERY 6 HOURS PRN
Status: DISCONTINUED | OUTPATIENT
Start: 2019-09-01 | End: 2019-09-04 | Stop reason: HOSPADM

## 2019-09-01 RX ORDER — SODIUM CHLORIDE, SODIUM LACTATE, POTASSIUM CHLORIDE, CALCIUM CHLORIDE 600; 310; 30; 20 MG/100ML; MG/100ML; MG/100ML; MG/100ML
30 INJECTION, SOLUTION INTRAVENOUS CONTINUOUS
Status: DISCONTINUED | OUTPATIENT
Start: 2019-09-01 | End: 2019-09-04 | Stop reason: HOSPADM

## 2019-09-01 RX ORDER — PROMETHAZINE HYDROCHLORIDE 25 MG/ML
12.5 INJECTION, SOLUTION INTRAMUSCULAR; INTRAVENOUS EVERY 6 HOURS PRN
Status: DISCONTINUED | OUTPATIENT
Start: 2019-09-01 | End: 2019-09-04 | Stop reason: HOSPADM

## 2019-09-01 RX ADMIN — PROMETHAZINE HYDROCHLORIDE 12.5 MG: 25 INJECTION INTRAMUSCULAR; INTRAVENOUS at 20:11

## 2019-09-01 RX ADMIN — FAMOTIDINE 20 MG: 10 INJECTION INTRAVENOUS at 20:11

## 2019-09-01 RX ADMIN — PROMETHAZINE HYDROCHLORIDE 12.5 MG: 25 INJECTION INTRAMUSCULAR; INTRAVENOUS at 01:50

## 2019-09-01 RX ADMIN — ONDANSETRON HYDROCHLORIDE 4 MG: 2 SOLUTION INTRAMUSCULAR; INTRAVENOUS at 05:34

## 2019-09-01 RX ADMIN — SODIUM CHLORIDE, POTASSIUM CHLORIDE, SODIUM LACTATE AND CALCIUM CHLORIDE 75 ML/HR: 600; 310; 30; 20 INJECTION, SOLUTION INTRAVENOUS at 15:06

## 2019-09-01 RX ADMIN — SODIUM CHLORIDE 75 ML/HR: 9 INJECTION, SOLUTION INTRAVENOUS at 08:59

## 2019-09-01 RX ADMIN — FAMOTIDINE 20 MG: 10 INJECTION INTRAVENOUS at 08:25

## 2019-09-02 PROBLEM — Z87.19 HISTORY OF ESOPHAGITIS: Status: ACTIVE | Noted: 2019-09-02

## 2019-09-02 PROBLEM — K92.2 GI BLEED: Status: ACTIVE | Noted: 2019-09-02

## 2019-09-02 LAB
ABO GROUP BLD: NORMAL
ANION GAP SERPL CALCULATED.3IONS-SCNC: 3 MMOL/L (ref 4–13)
BACTERIA UR QL AUTO: ABNORMAL /HPF
BILIRUB UR QL STRIP: NEGATIVE
BLD GP AB SCN SERPL QL: NEGATIVE
BUN BLD-MCNC: 31 MG/DL (ref 5–21)
BUN/CREAT SERPL: 47.7 (ref 7–25)
CALCIUM SPEC-SCNC: 8.2 MG/DL (ref 8.4–10.4)
CHLORIDE SERPL-SCNC: 110 MMOL/L (ref 98–110)
CLARITY UR: CLEAR
CO2 SERPL-SCNC: 28 MMOL/L (ref 24–31)
COLOR UR: YELLOW
CREAT BLD-MCNC: 0.65 MG/DL (ref 0.5–1.4)
DEPRECATED RDW RBC AUTO: 51.3 FL (ref 37–54)
ERYTHROCYTE [DISTWIDTH] IN BLOOD BY AUTOMATED COUNT: 18.9 % (ref 12.3–15.4)
GFR SERPL CREATININE-BSD FRML MDRD: 119 ML/MIN/1.73
GLUCOSE BLD-MCNC: 83 MG/DL (ref 70–100)
GLUCOSE UR STRIP-MCNC: NEGATIVE MG/DL
HCT VFR BLD AUTO: 23.4 % (ref 37.5–51)
HCT VFR BLD AUTO: 29.1 % (ref 37.5–51)
HGB BLD-MCNC: 6.8 G/DL (ref 13–17.7)
HGB BLD-MCNC: 8.9 G/DL (ref 13–17.7)
HGB UR QL STRIP.AUTO: NEGATIVE
HYALINE CASTS UR QL AUTO: ABNORMAL /LPF
KETONES UR QL STRIP: NEGATIVE
LEUKOCYTE ESTERASE UR QL STRIP.AUTO: ABNORMAL
MCH RBC QN AUTO: 22.4 PG (ref 26.6–33)
MCHC RBC AUTO-ENTMCNC: 29.1 G/DL (ref 31.5–35.7)
MCV RBC AUTO: 77.2 FL (ref 79–97)
NITRITE UR QL STRIP: NEGATIVE
PH UR STRIP.AUTO: <=5 [PH] (ref 5–8)
PLATELET # BLD AUTO: 145 10*3/MM3 (ref 140–450)
PMV BLD AUTO: 10.1 FL (ref 6–12)
POTASSIUM BLD-SCNC: 3.9 MMOL/L (ref 3.5–5.3)
PROT UR QL STRIP: ABNORMAL
RBC # BLD AUTO: 3.03 10*6/MM3 (ref 4.14–5.8)
RBC # UR: ABNORMAL /HPF
REF LAB TEST METHOD: ABNORMAL
RH BLD: POSITIVE
SODIUM BLD-SCNC: 141 MMOL/L (ref 135–145)
SP GR UR STRIP: 1.02 (ref 1–1.03)
SQUAMOUS #/AREA URNS HPF: ABNORMAL /HPF
T&S EXPIRATION DATE: NORMAL
UROBILINOGEN UR QL STRIP: ABNORMAL
WBC NRBC COR # BLD: 7.17 10*3/MM3 (ref 3.4–10.8)
WBC UR QL AUTO: ABNORMAL /HPF

## 2019-09-02 PROCEDURE — 86900 BLOOD TYPING SEROLOGIC ABO: CPT | Performed by: INTERNAL MEDICINE

## 2019-09-02 PROCEDURE — 86850 RBC ANTIBODY SCREEN: CPT | Performed by: INTERNAL MEDICINE

## 2019-09-02 PROCEDURE — 99232 SBSQ HOSP IP/OBS MODERATE 35: CPT | Performed by: INTERNAL MEDICINE

## 2019-09-02 PROCEDURE — 85018 HEMOGLOBIN: CPT | Performed by: INTERNAL MEDICINE

## 2019-09-02 PROCEDURE — 85014 HEMATOCRIT: CPT | Performed by: INTERNAL MEDICINE

## 2019-09-02 PROCEDURE — P9016 RBC LEUKOCYTES REDUCED: HCPCS

## 2019-09-02 PROCEDURE — 93010 ELECTROCARDIOGRAM REPORT: CPT | Performed by: INTERNAL MEDICINE

## 2019-09-02 PROCEDURE — 87086 URINE CULTURE/COLONY COUNT: CPT | Performed by: INTERNAL MEDICINE

## 2019-09-02 PROCEDURE — 92526 ORAL FUNCTION THERAPY: CPT | Performed by: SPEECH-LANGUAGE PATHOLOGIST

## 2019-09-02 PROCEDURE — 86920 COMPATIBILITY TEST SPIN: CPT

## 2019-09-02 PROCEDURE — 86900 BLOOD TYPING SEROLOGIC ABO: CPT

## 2019-09-02 PROCEDURE — 80048 BASIC METABOLIC PNL TOTAL CA: CPT | Performed by: INTERNAL MEDICINE

## 2019-09-02 PROCEDURE — 86901 BLOOD TYPING SEROLOGIC RH(D): CPT | Performed by: INTERNAL MEDICINE

## 2019-09-02 PROCEDURE — 93005 ELECTROCARDIOGRAM TRACING: CPT | Performed by: INTERNAL MEDICINE

## 2019-09-02 PROCEDURE — 81001 URINALYSIS AUTO W/SCOPE: CPT | Performed by: INTERNAL MEDICINE

## 2019-09-02 PROCEDURE — 25010000002 PROCHLORPERAZINE 10 MG/2ML SOLUTION: Performed by: INTERNAL MEDICINE

## 2019-09-02 PROCEDURE — 36430 TRANSFUSION BLD/BLD COMPNT: CPT

## 2019-09-02 PROCEDURE — 85027 COMPLETE CBC AUTOMATED: CPT | Performed by: INTERNAL MEDICINE

## 2019-09-02 RX ORDER — PANTOPRAZOLE SODIUM 40 MG/10ML
80 INJECTION, POWDER, LYOPHILIZED, FOR SOLUTION INTRAVENOUS ONCE
Status: COMPLETED | OUTPATIENT
Start: 2019-09-02 | End: 2019-09-02

## 2019-09-02 RX ORDER — CYANOCOBALAMIN 1000 UG/ML
1000 INJECTION, SOLUTION INTRAMUSCULAR; SUBCUTANEOUS
COMMUNITY
End: 2019-10-15

## 2019-09-02 RX ORDER — FINASTERIDE 5 MG/1
5 TABLET, FILM COATED ORAL DAILY
COMMUNITY
End: 2020-08-21 | Stop reason: HOSPADM

## 2019-09-02 RX ADMIN — SODIUM CHLORIDE, POTASSIUM CHLORIDE, SODIUM LACTATE AND CALCIUM CHLORIDE 75 ML/HR: 600; 310; 30; 20 INJECTION, SOLUTION INTRAVENOUS at 17:30

## 2019-09-02 RX ADMIN — OXYBUTYNIN CHLORIDE 10 MG: 5 TABLET, EXTENDED RELEASE ORAL at 10:07

## 2019-09-02 RX ADMIN — TAMSULOSIN HYDROCHLORIDE 0.4 MG: 0.4 CAPSULE ORAL at 20:01

## 2019-09-02 RX ADMIN — SODIUM CHLORIDE, POTASSIUM CHLORIDE, SODIUM LACTATE AND CALCIUM CHLORIDE 75 ML/HR: 600; 310; 30; 20 INJECTION, SOLUTION INTRAVENOUS at 04:34

## 2019-09-02 RX ADMIN — SUCRALFATE 1 G: 1 TABLET ORAL at 20:01

## 2019-09-02 RX ADMIN — SODIUM CHLORIDE 8 MG/HR: 900 INJECTION INTRAVENOUS at 22:54

## 2019-09-02 RX ADMIN — PHENOL 2 SPRAY: 1.5 LIQUID ORAL at 17:39

## 2019-09-02 RX ADMIN — PANTOPRAZOLE SODIUM 80 MG: 40 INJECTION, POWDER, LYOPHILIZED, FOR SOLUTION INTRAVENOUS at 10:07

## 2019-09-02 RX ADMIN — SODIUM CHLORIDE 8 MG/HR: 900 INJECTION INTRAVENOUS at 17:30

## 2019-09-02 RX ADMIN — PROCHLORPERAZINE EDISYLATE 5 MG: 5 INJECTION INTRAMUSCULAR; INTRAVENOUS at 03:17

## 2019-09-03 LAB
ABO + RH BLD: NORMAL
ABO + RH BLD: NORMAL
BH BB BLOOD EXPIRATION DATE: NORMAL
BH BB BLOOD EXPIRATION DATE: NORMAL
BH BB BLOOD TYPE BARCODE: 5100
BH BB BLOOD TYPE BARCODE: 5100
BH BB DISPENSE STATUS: NORMAL
BH BB DISPENSE STATUS: NORMAL
BH BB PRODUCT CODE: NORMAL
BH BB PRODUCT CODE: NORMAL
BH BB UNIT NUMBER: NORMAL
BH BB UNIT NUMBER: NORMAL
CROSSMATCH INTERPRETATION: NORMAL
CROSSMATCH INTERPRETATION: NORMAL
FERRITIN SERPL-MCNC: 18.1 NG/ML (ref 17.9–464)
HCT VFR BLD AUTO: 25.7 % (ref 37.5–51)
HCT VFR BLD AUTO: 27.1 % (ref 37.5–51)
HCT VFR BLD AUTO: 27.5 % (ref 37.5–51)
HCT VFR BLD AUTO: 28.4 % (ref 37.5–51)
HGB BLD-MCNC: 8 G/DL (ref 13–17.7)
HGB BLD-MCNC: 8.3 G/DL (ref 13–17.7)
HGB BLD-MCNC: 8.5 G/DL (ref 13–17.7)
HGB BLD-MCNC: 8.7 G/DL (ref 13–17.7)
IRON 24H UR-MRATE: 27 MCG/DL (ref 42–180)
IRON SATN MFR SERPL: 8 % (ref 20–45)
TIBC SERPL-MCNC: 350 MCG/DL (ref 225–420)
UNIT  ABO: NORMAL
UNIT  ABO: NORMAL
UNIT  RH: NORMAL
UNIT  RH: NORMAL

## 2019-09-03 PROCEDURE — 85014 HEMATOCRIT: CPT | Performed by: INTERNAL MEDICINE

## 2019-09-03 PROCEDURE — 25010000002 IRON SUCROSE PER 1 MG: Performed by: INTERNAL MEDICINE

## 2019-09-03 PROCEDURE — 92526 ORAL FUNCTION THERAPY: CPT

## 2019-09-03 PROCEDURE — 85018 HEMOGLOBIN: CPT | Performed by: INTERNAL MEDICINE

## 2019-09-03 PROCEDURE — 83550 IRON BINDING TEST: CPT | Performed by: INTERNAL MEDICINE

## 2019-09-03 PROCEDURE — 82728 ASSAY OF FERRITIN: CPT | Performed by: INTERNAL MEDICINE

## 2019-09-03 PROCEDURE — 83540 ASSAY OF IRON: CPT | Performed by: INTERNAL MEDICINE

## 2019-09-03 PROCEDURE — 99232 SBSQ HOSP IP/OBS MODERATE 35: CPT | Performed by: CLINICAL NURSE SPECIALIST

## 2019-09-03 PROCEDURE — 97162 PT EVAL MOD COMPLEX 30 MIN: CPT

## 2019-09-03 RX ADMIN — SODIUM CHLORIDE 8 MG/HR: 900 INJECTION INTRAVENOUS at 03:34

## 2019-09-03 RX ADMIN — TAMSULOSIN HYDROCHLORIDE 0.4 MG: 0.4 CAPSULE ORAL at 20:27

## 2019-09-03 RX ADMIN — SODIUM CHLORIDE 8 MG/HR: 900 INJECTION INTRAVENOUS at 09:00

## 2019-09-03 RX ADMIN — SODIUM CHLORIDE, POTASSIUM CHLORIDE, SODIUM LACTATE AND CALCIUM CHLORIDE 75 ML/HR: 600; 310; 30; 20 INJECTION, SOLUTION INTRAVENOUS at 09:02

## 2019-09-03 RX ADMIN — SODIUM CHLORIDE 8 MG/HR: 900 INJECTION INTRAVENOUS at 13:43

## 2019-09-03 RX ADMIN — SUCRALFATE 1 G: 1 TABLET ORAL at 20:27

## 2019-09-03 RX ADMIN — IRON SUCROSE 200 MG: 20 INJECTION, SOLUTION INTRAVENOUS at 16:58

## 2019-09-03 RX ADMIN — SODIUM CHLORIDE 8 MG/HR: 900 INJECTION INTRAVENOUS at 20:27

## 2019-09-04 VITALS
HEIGHT: 67 IN | TEMPERATURE: 97.1 F | BODY MASS INDEX: 24.99 KG/M2 | WEIGHT: 159.2 LBS | SYSTOLIC BLOOD PRESSURE: 151 MMHG | HEART RATE: 79 BPM | OXYGEN SATURATION: 99 % | RESPIRATION RATE: 18 BRPM | DIASTOLIC BLOOD PRESSURE: 89 MMHG

## 2019-09-04 LAB
ANION GAP SERPL CALCULATED.3IONS-SCNC: 4 MMOL/L (ref 4–13)
BACTERIA SPEC AEROBE CULT: NORMAL
BASOPHILS # BLD AUTO: 0.01 10*3/MM3 (ref 0–0.2)
BASOPHILS NFR BLD AUTO: 0.3 % (ref 0–1.5)
BUN BLD-MCNC: 15 MG/DL (ref 5–21)
BUN/CREAT SERPL: 22.7 (ref 7–25)
CALCIUM SPEC-SCNC: 8 MG/DL (ref 8.4–10.4)
CHLORIDE SERPL-SCNC: 107 MMOL/L (ref 98–110)
CO2 SERPL-SCNC: 28 MMOL/L (ref 24–31)
CREAT BLD-MCNC: 0.66 MG/DL (ref 0.5–1.4)
DEPRECATED RDW RBC AUTO: 55.7 FL (ref 37–54)
EOSINOPHIL # BLD AUTO: 0.1 10*3/MM3 (ref 0–0.4)
EOSINOPHIL NFR BLD AUTO: 2.5 % (ref 0.3–6.2)
ERYTHROCYTE [DISTWIDTH] IN BLOOD BY AUTOMATED COUNT: 21.2 % (ref 12.3–15.4)
GFR SERPL CREATININE-BSD FRML MDRD: 117 ML/MIN/1.73
GLUCOSE BLD-MCNC: 87 MG/DL (ref 70–100)
HCT VFR BLD AUTO: 27 % (ref 37.5–51)
HGB BLD-MCNC: 8.5 G/DL (ref 13–17.7)
IMM GRANULOCYTES # BLD AUTO: 0.02 10*3/MM3 (ref 0–0.05)
IMM GRANULOCYTES NFR BLD AUTO: 0.5 % (ref 0–0.5)
LYMPHOCYTES # BLD AUTO: 0.82 10*3/MM3 (ref 0.7–3.1)
LYMPHOCYTES NFR BLD AUTO: 20.7 % (ref 19.6–45.3)
MCH RBC QN AUTO: 25.4 PG (ref 26.6–33)
MCHC RBC AUTO-ENTMCNC: 31.5 G/DL (ref 31.5–35.7)
MCV RBC AUTO: 80.6 FL (ref 79–97)
MONOCYTES # BLD AUTO: 0.34 10*3/MM3 (ref 0.1–0.9)
MONOCYTES NFR BLD AUTO: 8.6 % (ref 5–12)
NEUTROPHILS # BLD AUTO: 2.67 10*3/MM3 (ref 1.7–7)
NEUTROPHILS NFR BLD AUTO: 67.4 % (ref 42.7–76)
NRBC BLD AUTO-RTO: 1.3 /100 WBC (ref 0–0.2)
PLATELET # BLD AUTO: 95 10*3/MM3 (ref 140–450)
PMV BLD AUTO: 10.8 FL (ref 6–12)
POTASSIUM BLD-SCNC: 3.6 MMOL/L (ref 3.5–5.3)
RBC # BLD AUTO: 3.35 10*6/MM3 (ref 4.14–5.8)
SODIUM BLD-SCNC: 139 MMOL/L (ref 135–145)
WBC NRBC COR # BLD: 3.96 10*3/MM3 (ref 3.4–10.8)

## 2019-09-04 PROCEDURE — 80048 BASIC METABOLIC PNL TOTAL CA: CPT | Performed by: INTERNAL MEDICINE

## 2019-09-04 PROCEDURE — 85025 COMPLETE CBC W/AUTO DIFF WBC: CPT | Performed by: INTERNAL MEDICINE

## 2019-09-04 PROCEDURE — 99232 SBSQ HOSP IP/OBS MODERATE 35: CPT | Performed by: CLINICAL NURSE SPECIALIST

## 2019-09-04 RX ORDER — ONDANSETRON 4 MG/1
4 TABLET, ORALLY DISINTEGRATING ORAL EVERY 8 HOURS PRN
Qty: 30 TABLET | Refills: 0 | Status: SHIPPED | OUTPATIENT
Start: 2019-09-04 | End: 2019-10-15

## 2019-09-04 RX ADMIN — SODIUM CHLORIDE 8 MG/HR: 900 INJECTION INTRAVENOUS at 08:34

## 2019-09-04 RX ADMIN — OXYBUTYNIN CHLORIDE 10 MG: 5 TABLET, EXTENDED RELEASE ORAL at 08:34

## 2019-09-05 ENCOUNTER — READMISSION MANAGEMENT (OUTPATIENT)
Dept: CALL CENTER | Facility: HOSPITAL | Age: 79
End: 2019-09-05

## 2019-09-05 NOTE — OUTREACH NOTE
Prep Survey      Responses   Facility patient discharged from?  [place]   Is patient eligible?  Yes   Discharge diagnosis  Chemo induced N/V, GI bleed, infestation by bed bug, hx esophagitis, protein calorie malnutrition, Dysphagia, anemia d/t chemo, weight loss, GERD, colon cancer, Schatzki's ring   Does the patient have one of the following disease processes/diagnoses(primary or secondary)?  Other   Does the patient have Home health ordered?  No   Is there a DME ordered?  No   Comments regarding appointments  Pt to schedule follow up with PCP   Prep survey completed?  Yes          Mercedes Lester RN

## 2019-09-09 ENCOUNTER — READMISSION MANAGEMENT (OUTPATIENT)
Dept: CALL CENTER | Facility: HOSPITAL | Age: 79
End: 2019-09-09

## 2019-09-09 NOTE — OUTREACH NOTE
Medical Week 1 Survey      Responses   Facility patient discharged from?  [place]   Does the patient have one of the following disease processes/diagnoses(primary or secondary)?  Other   Is there a successful TCM telephone encounter documented?  No   Week 1 attempt successful?  Yes   Call start time  1223   Revoke  Decline to participate   Is patient permission given to speak with other caregiver?  No          Sangeeta Chamorro RN

## 2019-10-15 ENCOUNTER — OFFICE VISIT (OUTPATIENT)
Dept: GASTROENTEROLOGY | Facility: CLINIC | Age: 79
End: 2019-10-15

## 2019-10-15 VITALS
HEIGHT: 70 IN | DIASTOLIC BLOOD PRESSURE: 68 MMHG | BODY MASS INDEX: 21.9 KG/M2 | WEIGHT: 153 LBS | HEART RATE: 79 BPM | TEMPERATURE: 98.2 F | OXYGEN SATURATION: 99 % | SYSTOLIC BLOOD PRESSURE: 114 MMHG

## 2019-10-15 DIAGNOSIS — Z87.19 HISTORY OF ESOPHAGITIS: ICD-10-CM

## 2019-10-15 DIAGNOSIS — R13.19 OTHER DYSPHAGIA: Primary | ICD-10-CM

## 2019-10-15 DIAGNOSIS — R11.2 NON-INTRACTABLE VOMITING WITH NAUSEA, UNSPECIFIED VOMITING TYPE: ICD-10-CM

## 2019-10-15 DIAGNOSIS — R63.4 WEIGHT LOSS: ICD-10-CM

## 2019-10-15 DIAGNOSIS — Z87.19 HISTORY OF ESOPHAGEAL STRICTURE: ICD-10-CM

## 2019-10-15 PROBLEM — R11.10 NON-INTRACTABLE VOMITING: Status: ACTIVE | Noted: 2019-10-15

## 2019-10-15 PROCEDURE — 99214 OFFICE O/P EST MOD 30 MIN: CPT | Performed by: NURSE PRACTITIONER

## 2019-10-15 NOTE — H&P (VIEW-ONLY)
"Chief Complaint:   Chief Complaint   Patient presents with   • Difficulty Swallowing     Pt c/o trouble swallowing for the last few months-states he \"does good to get water down\" without choking; States when he eats foods/takes medications they \"come right back up\"          Patient ID: Jonas Snow is a 79 y.o. male     History of Present Illness: This is a very pleasant 79-year-old male who is here today with complaints of difficulty swallowing.    The patient states that over the past 3 weeks he has begun to have the same problem \"is always have with choking.\"  He states that food feels as though it gets stuck and he states \"it comes right back up.\"  He states that he is always having vomiting with eating but does not really complain of any nausea.The patient takes Protonix 40 mg twice daily. He states he has lost about 10 pounds in about 3 weeks. He states he is drinking Ensure.    the patient's last EGD was performed on 10/5/2017 revealed esophagitis, esophageal stenosis dilated, small hiatal hernia.  The patient was rescoped after treatment on 12/11/2017 found to be normal.  The patient tells me he is still undergoing chemotherapy for colon cancer.    The patient denies any nausea,  epigastric pain,  pyrosis or hematemesis.  The patient denies any fever or chills.  Denies any melena or hematochezia.     Last 15 Recorded Weights   View Complete Flowsheet   Weight Weight (kg) Weight (lbs) Weight Method VISIT REPORT   10/15/2019 69.4 kg 153 lb - Report   8/31/2019 72.213 kg 159 lb 3.2 oz Bed scale -   10/31/2018 68.765 kg 151 lb 9.6 oz Standing scale -   9/28/2018 65.772 kg 145 lb Standing scale -   8/31/2018 67.586 kg 149 lb Stated -   8/29/2018 68.947 kg 152 lb - Report   1/19/2018 79.379 kg 175 lb - -   12/11/2017 78.926 kg 174 lb Standing scale -   11/2/2017 77.111 kg 170 lb Standing scale -   10/5/2017 71.668 kg 158 lb           Past Medical History:   Diagnosis Date   • Colon cancer (CMS/HCC)    • GERD " (gastroesophageal reflux disease)    • Hiatal hernia    • History of colon polyps    • Liver disease    • Peptic stricture of esophagus    • Schatzki's ring    • 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)    • Secondary malignant neoplasm of unspecified lung (CMS/HCC)    • Type 2 diabetes mellitus (CMS/HCC)    • Vitamin D deficiency        Past Surgical History:   Procedure Laterality Date   • COLONOSCOPY N/A 1/6/2017    Diverticulosis; Malignant tumor in ascending colon-biopsied; One 20mm polyp in transverse colon-tattooed; Five 5-12mm polyps in transverse colon; One 7mm polyp at splenic flexure; Two 6-10mm polyps in descending colon; Two 5-8mm polyps in sigmoid colon; One 20mm polyp in the sigmoid colon-Clips placed; One 8mm polyp in rectum; Non-bleeding internal hemorrhoids; Repeat 1 year; See path report   • ENDOSCOPY  12/19/2014    HH; Non-obstructing Schatzki ring-dilated; Normal stomach; Normal examined duodenum   • ENDOSCOPY N/A 10/5/2017    LA Grade D esophagitis-biopsied; Esophageal stenosis-dilated; Small HH; Normal stomach; Normal examined duodenum; Repeat 1 month for retreatment   • ENDOSCOPY N/A 11/2/2017    Small HH; Esophageal stenosis-biopsied; Normal stomach; Normal examined duodenum   • ENDOSCOPY N/A 12/11/2017    Medium-sized HH; Low-grade of narrowing and non-obstructing Schatzki ring-dilated; Normal stomach; Normal examined duodenum; No specimens collected; Repeat 4-6 weeks for retreatment   • ENDOSCOPY N/A 1/19/2018    Medium-sized HH; Non-obstructing Schatzki ring-dilated; Normal stomach; Normal examined duodenum; No specimens collected   • ENDOSCOPY N/A 8/31/2018    Benign-appearing esophageal stenosis-dilated; Small HH; Normal stomach; Normal examined duodenum; No specimens collected; Repeat 2-4 for retreatment   • ENDOSCOPY N/A 9/28/2018    Medium-sized HH; LA Grade D reflux esophagitis-dilated; Normal stomach; Normal examined duodenum; No specimens  "collected; Repeat 4-6 weeks   • ENDOSCOPY N/A 10/31/2018    Benign-appearing esophageal stenosis-dilated; Medium-sized HH; Normal stomach; Normal examined duodenum; No specimens collected   • ENDOSCOPY  11/18/2014    HH; Benign-appearing esophageal stricture-dilated; Normal stomach; Normal examined duodenum; Repeat 4 weeks for retreatment   • TONSILLECTOMY     • VENOUS ACCESS DEVICE (PORT) INSERTION           Current Outpatient Medications:   •  finasteride (PROSCAR) 5 MG tablet, Take 5 mg by mouth Daily., Disp: , Rfl:   •  pantoprazole (PROTONIX) 40 MG EC tablet, Take 1 tablet by mouth 2 (Two) Times a Day Before Meals., Disp: 60 tablet, Rfl: 11  •  tamsulosin (FLOMAX) 0.4 MG capsule 24 hr capsule, Take 1 capsule by mouth Every Night., Disp: , Rfl:     No Known Allergies    Social History     Socioeconomic History   • Marital status:      Spouse name: Not on file   • Number of children: Not on file   • Years of education: Not on file   • Highest education level: Not on file   Tobacco Use   • Smoking status: Former Smoker     Types: Cigarettes     Last attempt to quit: 1/6/2009     Years since quitting: 10.7   • Smokeless tobacco: Never Used   Substance and Sexual Activity   • Alcohol use: Yes     Comment: Occasionally   • Drug use: No   • Sexual activity: Defer       Family History   Problem Relation Age of Onset   • Colon cancer Neg Hx    • Colon polyps Neg Hx    • Esophageal cancer Neg Hx    • Liver cancer Neg Hx    • Liver disease Neg Hx    • Rectal cancer Neg Hx    • Stomach cancer Neg Hx        Vitals:    10/15/19 0841   BP: 114/68   BP Location: Left arm   Patient Position: Sitting   Cuff Size: Adult   Pulse: 79   Temp: 98.2 °F (36.8 °C)   TempSrc: Tympanic   SpO2: 99%   Weight: 69.4 kg (153 lb)   Height: 176.5 cm (69.5\")       Review of Systems:    General:    Present -feeling well   Skin:    Not Present-Rash   HEENT:     Not Present-Acute visual changes or Acute hearing changes   Neck :    Not " Present- swollen glands   Genitourinary:      Not Present- burning, frequency, urgency hematuria, dysuria,   Cardiovascular:   Not Present-chest pain, palpitations, or pressure   Respiratory:   Not Present- shortness of breath or cough   Gastrointestinal:  Musculoskeletal:  Neurological:  Psychiatric:   Present as mentioned in the HP    Not Present. Recent gait disturbances.    Not Present-Seizures and weakness in extremities.    Not Present- Anxiety or Depression.       Physical Exam:    General Appearance:    Alert, cooperative, in no acute distress   Psych:    Mood appropriate    Eyes:          conjunctivae and sclerae normal, no   icterus, no pallor   ENMT:    Ears appear intact with no abnormalities noted oral mucosa moist   Neck:   No adenopathy, supple, trachea midline, no thyromegaly, no   carotid bruit, no JVD    Cardiovascular:    Regular rhythm and normal rate, normal S1 and S2, no            murmur, no gallop, no rub, no click   Gastrointestinal:     Inspection normal.  Normal bowel sounds, no masses, no organomegaly, soft round non-tender, non-distended, no guarding, no rebound or tenderness. No hepatosplenomegaly.   Skin:   No bleeding, bruising or rash   Neurologic:   nonfocal       Lab Results   Component Value Date    WBC 3.96 09/04/2019    WBC 7.17 09/02/2019    WBC 9.25 09/01/2019    HGB 8.5 (L) 09/04/2019    HGB 8.0 (L) 09/03/2019    HGB 8.7 (L) 09/03/2019    HCT 27.0 (L) 09/04/2019    HCT 25.7 (L) 09/03/2019    HCT 28.4 (L) 09/03/2019    PLT 95 (L) 09/04/2019     09/02/2019     09/01/2019        Lab Results   Component Value Date     09/04/2019     09/02/2019     09/01/2019    K 3.6 09/04/2019    K 3.9 09/02/2019    K 4.3 09/01/2019     09/04/2019     09/02/2019     09/01/2019    CO2 28.0 09/04/2019    CO2 28.0 09/02/2019    CO2 28.0 09/01/2019    BUN 15 09/04/2019    BUN 31 (H) 09/02/2019    BUN 33 (H) 09/01/2019    CREATININE 0.66 09/04/2019     CREATININE 0.65 09/02/2019    CREATININE 0.68 09/01/2019    BILITOT 2.0 (H) 09/01/2019    BILITOT 2.2 (H) 06/24/2017    ALKPHOS 75 09/01/2019    ALKPHOS 128 (H) 06/24/2017    ALT 21 09/01/2019    ALT 35 06/24/2017    AST 17 09/01/2019    AST 39 06/24/2017    GLUCOSE 87 09/04/2019    GLUCOSE 83 09/02/2019    GLUCOSE 116 (H) 09/01/2019       No results found for: INR    Body mass index is 22.27 kg/m². Patient's Body mass index is 22.27 kg/m². BMI is within normal parameters. No follow-up required..    Assessment and Plan:  Assessment/Plan   Jonas was seen today for difficulty swallowing.    Diagnoses and all orders for this visit:    Other dysphagia  Comments:  Schedule EGD  Orders:  -     Case Request; Standing  -     Case Request    History of esophagitis  Comments:  Continue on PPI  Orders:  -     Case Request; Standing  -     Case Request    History of esophageal stricture  -     Case Request; Standing  -     Case Request    Non-intractable vomiting with nausea, unspecified vomiting type  -     Case Request; Standing  -     Case Request    Weight loss  -     Case Request; Standing  -     Case Request    Other orders  -     Follow Anesthesia Guidelines / Standing Orders; Future  -     Obtain Informed Consent; Future  -     Implement Anesthesia Orders Day of Procedure; Standing  -     Obtain Informed Consent; Standing        The risks, benefits, and alternatives of endoscopy were reviewed with the patient today.  Risks including perforation, with or without dilation, possibly requiring surgery.  Additional risks include risk of bleeding.  There is also the risk of a drug reaction or problems with anesthesia.  This will be discussed with the further by the anesthesia team on the day of the procedure. The benefits include the diagnosis and management of disease of the upper digestive tract.  Alternatives to endoscopy include upper GI series, laboratory testing, radiographic evaluation, or no intervention.  The  patient verbalizes understanding and agrees.       There are no Patient Instructions on file for this visit.    Next follow-up appointment          EMR Dragon/Transcription disclaimer:  Much of this encounter note is an electronic transcription/translation of spoken language to printed text. The electronic translation of spoken language may permit erroneous, or at times, nonsensical words or phrases to be inadvertently transcribed; although I have reviewed the note for such errors, some may still exist.

## 2019-10-17 ENCOUNTER — ANESTHESIA (OUTPATIENT)
Dept: GASTROENTEROLOGY | Facility: HOSPITAL | Age: 79
End: 2019-10-17

## 2019-10-17 ENCOUNTER — HOSPITAL ENCOUNTER (OUTPATIENT)
Facility: HOSPITAL | Age: 79
Setting detail: HOSPITAL OUTPATIENT SURGERY
Discharge: HOME | End: 2019-10-17
Attending: INTERNAL MEDICINE | Admitting: INTERNAL MEDICINE

## 2019-10-17 ENCOUNTER — ANESTHESIA EVENT (OUTPATIENT)
Dept: GASTROENTEROLOGY | Facility: HOSPITAL | Age: 79
End: 2019-10-17

## 2019-10-17 VITALS
RESPIRATION RATE: 19 BRPM | HEART RATE: 65 BPM | HEIGHT: 69 IN | SYSTOLIC BLOOD PRESSURE: 110 MMHG | BODY MASS INDEX: 22.66 KG/M2 | WEIGHT: 153 LBS | TEMPERATURE: 98.2 F | DIASTOLIC BLOOD PRESSURE: 64 MMHG | OXYGEN SATURATION: 98 %

## 2019-10-17 DIAGNOSIS — K22.2 ESOPHAGEAL STRICTURE: Primary | ICD-10-CM

## 2019-10-17 PROCEDURE — 43248 EGD GUIDE WIRE INSERTION: CPT | Performed by: INTERNAL MEDICINE

## 2019-10-17 PROCEDURE — 25010000002 PROPOFOL 10 MG/ML EMULSION: Performed by: NURSE ANESTHETIST, CERTIFIED REGISTERED

## 2019-10-17 RX ORDER — PROPOFOL 10 MG/ML
VIAL (ML) INTRAVENOUS AS NEEDED
Status: DISCONTINUED | OUTPATIENT
Start: 2019-10-17 | End: 2019-10-17 | Stop reason: SURG

## 2019-10-17 RX ORDER — SODIUM CHLORIDE 9 MG/ML
500 INJECTION, SOLUTION INTRAVENOUS CONTINUOUS PRN
Status: DISCONTINUED | OUTPATIENT
Start: 2019-10-17 | End: 2019-10-17 | Stop reason: HOSPADM

## 2019-10-17 RX ORDER — SUCRALFATE ORAL 1 G/10ML
1 SUSPENSION ORAL 4 TIMES DAILY
Qty: 3600 ML | Refills: 0 | Status: ON HOLD | OUTPATIENT
Start: 2019-10-17 | End: 2019-12-23 | Stop reason: SDUPTHER

## 2019-10-17 RX ORDER — SODIUM CHLORIDE 0.9 % (FLUSH) 0.9 %
10 SYRINGE (ML) INJECTION AS NEEDED
Status: DISCONTINUED | OUTPATIENT
Start: 2019-10-17 | End: 2019-10-17 | Stop reason: HOSPADM

## 2019-10-17 RX ADMIN — SODIUM CHLORIDE 500 ML: 9 INJECTION, SOLUTION INTRAVENOUS at 07:17

## 2019-10-17 RX ADMIN — LIDOCAINE HYDROCHLORIDE 50 MG: 20 INJECTION, SOLUTION INTRAVENOUS at 07:51

## 2019-10-17 RX ADMIN — PROPOFOL 25 MG: 10 INJECTION, EMULSION INTRAVENOUS at 07:55

## 2019-10-17 RX ADMIN — PROPOFOL 50 MG: 10 INJECTION, EMULSION INTRAVENOUS at 07:51

## 2019-10-17 RX ADMIN — PROPOFOL 25 MG: 10 INJECTION, EMULSION INTRAVENOUS at 07:59

## 2019-10-17 NOTE — ANESTHESIA POSTPROCEDURE EVALUATION
Patient: Jonas Snow    Procedure Summary     Date:  10/17/19 Room / Location:  [facility] ENDOSCOPY 4 / [facility] ENDOSCOPY    Anesthesia Start:  0748 Anesthesia Stop:  0806    Procedure:  ESOPHAGOGASTRODUODENOSCOPY WITH ANESTHESIA (N/A ) Diagnosis:       Other dysphagia      History of esophagitis      History of esophageal stricture      Non-intractable vomiting with nausea, unspecified vomiting type      Weight loss      (Other dysphagia [R13.19])      (History of esophagitis [Z87.19])      (History of esophageal stricture [Z87.19])      (Non-intractable vomiting with nausea, unspecified vomiting type [R11.2])      (Weight loss [R63.4])    Surgeon:  Haydee Krishnamurthy MD Provider:  Ricky Delatorre CRNA    Anesthesia Type:  MAC ASA Status:  3          Anesthesia Type: MAC  Last vitals  BP   110/64 (10/17/19 0825)   Temp   98.2 °F (36.8 °C) (10/17/19 0659)   Pulse   65 (10/17/19 0825)   Resp   19 (10/17/19 0825)     SpO2   98 % (10/17/19 0825)     Post Anesthesia Care and Evaluation    Patient location during evaluation: PHASE II  Patient participation: complete - patient participated  Level of consciousness: awake and alert  Pain score: 0  Pain management: adequate  Airway patency: patent  Anesthetic complications: No anesthetic complications  PONV Status: none  Cardiovascular status: acceptable and stable  Respiratory status: acceptable  Hydration status: acceptable

## 2019-10-17 NOTE — DISCHARGE INSTR - LAB
YOU HAVE A FOLLOW UP ENDOSCOPY THURSDAY NOVEMBER 7TH AT 0630 AT [facility]. GO TO HER OFFICE TODAY TO GET YOUR INSTRUCTIONS FOR YOUR ENDOSCOPY.

## 2019-10-17 NOTE — INTERVAL H&P NOTE
H&P updated. The patient was examined and the following changes are noted:        Last endoscopy was done a year ago.  He was dilated up to 13.5 mm.

## 2019-10-17 NOTE — ANESTHESIA PREPROCEDURE EVALUATION
Anesthesia Evaluation     Patient summary reviewed and Nursing notes reviewed   no history of anesthetic complications:  NPO Solid Status: > 8 hours  NPO Liquid Status: > 8 hours           Airway   Mallampati: II  TM distance: >3 FB  Neck ROM: full  Dental - normal exam     Pulmonary - normal exam    breath sounds clear to auscultation  (+) a smoker (quit 2009) Former,   (-) asthma, recent URI, sleep apnea  Cardiovascular - normal exam  Exercise tolerance: good (4-7 METS)    Rhythm: regular  Rate: normal    (-) pacemaker, hypertension, past MI, angina, cardiac stents, CABG      Neuro/Psych  (-) seizures, TIA, CVA  GI/Hepatic/Renal/Endo    (+)  hiatal hernia, GERD well controlled, GI bleeding, liver disease (colon cancer mets to liver),   (-) no renal disease, diabetes, hypothyroidism, hyperthyroidism    ROS Comment: Admission 9/2019 with anemia    Now presents with difficulty swallowing    Musculoskeletal     Abdominal    Substance History      OB/GYN          Other      history of cancer (metastatic colon cancer ) active                      Anesthesia Plan    ASA 3     MAC     intravenous induction   Anesthetic plan, all risks, benefits, and alternatives have been provided, discussed and informed consent has been obtained with: patient.

## 2019-10-22 ENCOUNTER — TELEPHONE (OUTPATIENT)
Dept: GASTROENTEROLOGY | Facility: CLINIC | Age: 79
End: 2019-10-22

## 2019-10-22 NOTE — TELEPHONE ENCOUNTER
Pt called me this morning-Dr. Krishnamurthy gave him a script for carafate suspension at his endo last week. [facility], but that appt has been cancelled. Pt now has no way to get [facility] to [facility]. He called me today to ask if I could fax it to them.     I re-printed his script and had Dr. Krishnamurthy sign it. I faxed to [facility].  Pt advised to call me back with any further questions/problems.

## 2019-11-07 ENCOUNTER — ANESTHESIA EVENT (OUTPATIENT)
Dept: GASTROENTEROLOGY | Facility: HOSPITAL | Age: 79
End: 2019-11-07

## 2019-11-07 ENCOUNTER — ANESTHESIA (OUTPATIENT)
Dept: GASTROENTEROLOGY | Facility: HOSPITAL | Age: 79
End: 2019-11-07

## 2019-11-07 ENCOUNTER — HOSPITAL ENCOUNTER (OUTPATIENT)
Facility: HOSPITAL | Age: 79
Setting detail: HOSPITAL OUTPATIENT SURGERY
Discharge: HOME | End: 2019-11-07
Attending: INTERNAL MEDICINE | Admitting: INTERNAL MEDICINE

## 2019-11-07 VITALS
RESPIRATION RATE: 13 BRPM | HEART RATE: 70 BPM | TEMPERATURE: 97.9 F | WEIGHT: 149 LBS | SYSTOLIC BLOOD PRESSURE: 120 MMHG | DIASTOLIC BLOOD PRESSURE: 61 MMHG | BODY MASS INDEX: 23.39 KG/M2 | HEIGHT: 67 IN | OXYGEN SATURATION: 99 %

## 2019-11-07 DIAGNOSIS — K22.2 SCHATZKI'S RING: Primary | ICD-10-CM

## 2019-11-07 PROCEDURE — 43248 EGD GUIDE WIRE INSERTION: CPT | Performed by: INTERNAL MEDICINE

## 2019-11-07 PROCEDURE — 25010000002 PROPOFOL 10 MG/ML EMULSION: Performed by: NURSE ANESTHETIST, CERTIFIED REGISTERED

## 2019-11-07 RX ORDER — SODIUM CHLORIDE 0.9 % (FLUSH) 0.9 %
3-10 SYRINGE (ML) INJECTION AS NEEDED
Status: CANCELLED | OUTPATIENT
Start: 2019-11-07

## 2019-11-07 RX ORDER — SODIUM CHLORIDE 9 MG/ML
100 INJECTION, SOLUTION INTRAVENOUS CONTINUOUS
Status: CANCELLED | OUTPATIENT
Start: 2019-11-07

## 2019-11-07 RX ORDER — SODIUM CHLORIDE 9 MG/ML
500 INJECTION, SOLUTION INTRAVENOUS CONTINUOUS PRN
Status: DISCONTINUED | OUTPATIENT
Start: 2019-11-07 | End: 2019-11-07 | Stop reason: HOSPADM

## 2019-11-07 RX ORDER — PROPOFOL 10 MG/ML
VIAL (ML) INTRAVENOUS AS NEEDED
Status: DISCONTINUED | OUTPATIENT
Start: 2019-11-07 | End: 2019-11-07 | Stop reason: SURG

## 2019-11-07 RX ORDER — SODIUM CHLORIDE 0.9 % (FLUSH) 0.9 %
10 SYRINGE (ML) INJECTION AS NEEDED
Status: DISCONTINUED | OUTPATIENT
Start: 2019-11-07 | End: 2019-11-07 | Stop reason: HOSPADM

## 2019-11-07 RX ORDER — SODIUM CHLORIDE 0.9 % (FLUSH) 0.9 %
3 SYRINGE (ML) INJECTION EVERY 12 HOURS SCHEDULED
Status: CANCELLED | OUTPATIENT
Start: 2019-11-07

## 2019-11-07 RX ADMIN — SODIUM CHLORIDE 500 ML: 9 INJECTION, SOLUTION INTRAVENOUS at 07:28

## 2019-11-07 RX ADMIN — PROPOFOL 50 MG: 10 INJECTION, EMULSION INTRAVENOUS at 07:39

## 2019-11-07 NOTE — INTERVAL H&P NOTE
H&P updated. The patient was examined and the following changes are noted:        Dilated up to 27F 10/17.  He doesn't feel any better.   I called and spoke with Nahed Gibson, wife, to remind her of blood work that Merari Mcghee needs to get done prior to next week's appt

## 2019-11-07 NOTE — ANESTHESIA POSTPROCEDURE EVALUATION
Patient: Jonas Snow    Procedure Summary     Date:  11/07/19 Room / Location:  [facility] ENDOSCOPY 4 / [facility] ENDOSCOPY    Anesthesia Start:  0737 Anesthesia Stop:  0744    Procedure:  ESOPHAGOGASTRODUODENOSCOPY WITH ANESTHESIA (N/A ) Diagnosis:       Esophageal stricture      (Esophageal stricture [K22.2])    Surgeon:  Haydee Krishnamurthy MD Provider:  Juan Carlos Mims CRNA    Anesthesia Type:  MAC ASA Status:  3          Anesthesia Type: MAC  Last vitals  BP   126/83 (11/07/19 0706)   Temp   97.9 °F (36.6 °C) (11/07/19 0706)   Pulse   92 (11/07/19 0706)   Resp   20 (11/07/19 0706)     SpO2   98 % (11/07/19 0706)     Post Anesthesia Care and Evaluation    Patient location during evaluation: PHASE II  Patient participation: complete - patient participated  Level of consciousness: awake and alert  Pain management: adequate  Airway patency: patent  Anesthetic complications: No anesthetic complications  PONV Status: none  Cardiovascular status: acceptable and stable  Respiratory status: acceptable and unassisted  Hydration status: acceptable

## 2019-11-07 NOTE — ANESTHESIA PREPROCEDURE EVALUATION
Anesthesia Evaluation     no history of anesthetic complications:  NPO Solid Status: > 8 hours  NPO Liquid Status: > 8 hours           Airway   Mallampati: II  TM distance: >3 FB  Neck ROM: full  Dental    (+) poor dentition        Pulmonary - normal exam    breath sounds clear to auscultation  (-) asthma, recent URI, sleep apnea, not a smoker  Cardiovascular - normal exam  Exercise tolerance: good (4-7 METS)    Rhythm: regular  Rate: normal    (-) pacemaker, hypertension, past MI, angina, cardiac stents, CABG      Neuro/Psych  (-) seizures, TIA, CVA  GI/Hepatic/Renal/Endo    (+)  hiatal hernia, GERD,  liver disease (Liver mets from colon cancer),   (-) no renal disease, diabetes, no thyroid disorder    ROS Comment: Dysphagia    Musculoskeletal     Abdominal    Substance History      OB/GYN          Other      history of cancer (Colon Cancer) active                    Anesthesia Plan    ASA 3     MAC     intravenous induction     Anesthetic plan, all risks, benefits, and alternatives have been provided, discussed and informed consent has been obtained with: patient.

## 2019-12-02 ENCOUNTER — ANESTHESIA EVENT (OUTPATIENT)
Dept: GASTROENTEROLOGY | Facility: HOSPITAL | Age: 79
End: 2019-12-02

## 2019-12-02 ENCOUNTER — HOSPITAL ENCOUNTER (OUTPATIENT)
Facility: HOSPITAL | Age: 79
Setting detail: HOSPITAL OUTPATIENT SURGERY
Discharge: HOME | End: 2019-12-02
Attending: INTERNAL MEDICINE | Admitting: INTERNAL MEDICINE

## 2019-12-02 ENCOUNTER — ANESTHESIA (OUTPATIENT)
Dept: GASTROENTEROLOGY | Facility: HOSPITAL | Age: 79
End: 2019-12-02

## 2019-12-02 VITALS
HEART RATE: 71 BPM | DIASTOLIC BLOOD PRESSURE: 61 MMHG | SYSTOLIC BLOOD PRESSURE: 116 MMHG | RESPIRATION RATE: 16 BRPM | TEMPERATURE: 97.2 F | WEIGHT: 145 LBS | OXYGEN SATURATION: 100 % | BODY MASS INDEX: 21.48 KG/M2 | HEIGHT: 69 IN

## 2019-12-02 DIAGNOSIS — K22.2 ESOPHAGEAL STRICTURE: Primary | ICD-10-CM

## 2019-12-02 PROCEDURE — C1726 CATH, BAL DIL, NON-VASCULAR: HCPCS | Performed by: INTERNAL MEDICINE

## 2019-12-02 PROCEDURE — 43249 ESOPH EGD DILATION <30 MM: CPT | Performed by: INTERNAL MEDICINE

## 2019-12-02 PROCEDURE — 25010000002 PROPOFOL 10 MG/ML EMULSION: Performed by: NURSE ANESTHETIST, CERTIFIED REGISTERED

## 2019-12-02 RX ORDER — SODIUM CHLORIDE 9 MG/ML
500 INJECTION, SOLUTION INTRAVENOUS CONTINUOUS PRN
Status: DISCONTINUED | OUTPATIENT
Start: 2019-12-02 | End: 2019-12-02 | Stop reason: HOSPADM

## 2019-12-02 RX ORDER — SODIUM CHLORIDE 0.9 % (FLUSH) 0.9 %
10 SYRINGE (ML) INJECTION AS NEEDED
Status: DISCONTINUED | OUTPATIENT
Start: 2019-12-02 | End: 2019-12-02 | Stop reason: HOSPADM

## 2019-12-02 RX ORDER — SODIUM CHLORIDE 9 MG/ML
100 INJECTION, SOLUTION INTRAVENOUS CONTINUOUS
Status: CANCELLED | OUTPATIENT
Start: 2019-12-02

## 2019-12-02 RX ORDER — PROPOFOL 10 MG/ML
VIAL (ML) INTRAVENOUS AS NEEDED
Status: DISCONTINUED | OUTPATIENT
Start: 2019-12-02 | End: 2019-12-02 | Stop reason: SURG

## 2019-12-02 RX ORDER — SODIUM CHLORIDE 0.9 % (FLUSH) 0.9 %
3-10 SYRINGE (ML) INJECTION AS NEEDED
Status: CANCELLED | OUTPATIENT
Start: 2019-12-02

## 2019-12-02 RX ORDER — SODIUM CHLORIDE 0.9 % (FLUSH) 0.9 %
3 SYRINGE (ML) INJECTION EVERY 12 HOURS SCHEDULED
Status: CANCELLED | OUTPATIENT
Start: 2019-12-02

## 2019-12-02 RX ADMIN — SODIUM CHLORIDE 500 ML: 9 INJECTION, SOLUTION INTRAVENOUS at 09:03

## 2019-12-02 RX ADMIN — PROPOFOL 200 MG: 10 INJECTION, EMULSION INTRAVENOUS at 09:45

## 2019-12-02 RX ADMIN — LIDOCAINE HYDROCHLORIDE 80 MG: 20 INJECTION, SOLUTION INTRAVENOUS at 09:45

## 2019-12-02 NOTE — ANESTHESIA POSTPROCEDURE EVALUATION
"Patient: Jonas Snow    Procedure Summary     Date:  12/02/19 Room / Location:  [facility] ENDOSCOPY 6 / [facility] ENDOSCOPY    Anesthesia Start:  0942 Anesthesia Stop:  1003    Procedure:  ESOPHAGOGASTRODUODENOSCOPY WITH ANESTHESIA (N/A ) Diagnosis:       Schatzki's ring      (Schatzki's ring [K22.2])    Surgeon:  Haydee Krishnamurthy MD Provider:  Shanta Pillai CRNA    Anesthesia Type:  MAC ASA Status:  3          Anesthesia Type: MAC  Last vitals  BP   116/61 (12/02/19 1015)   Temp   97.2 °F (36.2 °C) (12/02/19 0840)   Pulse   71 (12/02/19 1010)   Resp   16 (12/02/19 1020)     SpO2   100 % (12/02/19 1010)     Post Anesthesia Care and Evaluation    Patient location during evaluation: PHASE II  Patient participation: complete - patient participated  Level of consciousness: awake and awake and alert  Pain score: 0  Pain management: adequate  Airway patency: patent  Anesthetic complications: No anesthetic complications  PONV Status: none  Cardiovascular status: acceptable  Respiratory status: acceptable  Hydration status: acceptable    Comments: Patient discharged according to acceptable Jose score per RN assessment. See nursing records for further information.     Blood pressure 116/61, pulse 71, temperature 97.2 °F (36.2 °C), temperature source Temporal, resp. rate 16, height 175.3 cm (69\"), weight 65.8 kg (145 lb), SpO2 100 %.        "

## 2019-12-02 NOTE — H&P
Chief Complaint:   Esophageal stricture    Subjective     HPI:   He has esophageal stricture is been sequentially dilated.  This was last done November 7 up to 33 French.    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)    • Secondary malignant neoplasm of unspecified lung (CMS/HCC)    • Vitamin D deficiency        Past Surgical History:  Past Surgical History:   Procedure Laterality Date   • COLONOSCOPY N/A 1/6/2017    Diverticulosis; Malignant tumor in ascending colon-biopsied; One 20mm polyp in transverse colon-tattooed; Five 5-12mm polyps in transverse colon; One 7mm polyp at splenic flexure; Two 6-10mm polyps in descending colon; Two 5-8mm polyps in sigmoid colon; One 20mm polyp in the sigmoid colon-Clips placed; One 8mm polyp in rectum; Non-bleeding internal hemorrhoids; Repeat 1 year; See path report   • ENDOSCOPY  12/19/2014    HH; Non-obstructing Schatzki ring-dilated; Normal stomach; Normal examined duodenum   • ENDOSCOPY N/A 10/5/2017    LA Grade D esophagitis-biopsied; Esophageal stenosis-dilated; Small HH; Normal stomach; Normal examined duodenum; Repeat 1 month for retreatment   • ENDOSCOPY N/A 11/2/2017    Small HH; Esophageal stenosis-biopsied; Normal stomach; Normal examined duodenum   • ENDOSCOPY N/A 12/11/2017    Medium-sized HH; Low-grade of narrowing and non-obstructing Schatzki ring-dilated; Normal stomach; Normal examined duodenum; No specimens collected; Repeat 4-6 weeks for retreatment   • ENDOSCOPY N/A 1/19/2018    Medium-sized HH; Non-obstructing Schatzki ring-dilated; Normal stomach; Normal examined duodenum; No specimens collected   • ENDOSCOPY N/A 8/31/2018    Benign-appearing esophageal  stenosis-dilated; Small HH; Normal stomach; Normal examined duodenum; No specimens collected; Repeat 2-4 for retreatment   • ENDOSCOPY N/A 9/28/2018    Medium-sized HH; LA Grade D reflux esophagitis-dilated; Normal stomach; Normal examined duodenum; No specimens collected; Repeat 4-6 weeks   • ENDOSCOPY N/A 10/31/2018    Benign-appearing esophageal stenosis-dilated; Medium-sized HH; Normal stomach; Normal examined duodenum; No specimens collected   • ENDOSCOPY  11/18/2014    HH; Benign-appearing esophageal stricture-dilated; Normal stomach; Normal examined duodenum; Repeat 4 weeks for retreatment   • ENDOSCOPY N/A 10/17/2019    Procedure: ESOPHAGOGASTRODUODENOSCOPY WITH ANESTHESIA;  Surgeon: Haydee Krishnamurthy MD;  Location: [facility] ENDOSCOPY;  Service: Gastroenterology   • ENDOSCOPY N/A 11/7/2019    Procedure: ESOPHAGOGASTRODUODENOSCOPY WITH ANESTHESIA;  Surgeon: Haydee Krishnamurthy MD;  Location: [facility] ENDOSCOPY;  Service: Gastroenterology   • TONSILLECTOMY     • VENOUS ACCESS DEVICE (PORT) INSERTION         Family History:  Family History   Problem Relation Age of Onset   • Colon cancer Neg Hx    • Colon polyps Neg Hx    • Esophageal cancer Neg Hx    • Liver cancer Neg Hx    • Liver disease Neg Hx    • Rectal cancer Neg Hx    • Stomach cancer Neg Hx        Social History:   reports that he quit smoking about 10 years ago. His smoking use included cigarettes. He has never used smokeless tobacco. He reports that he drinks alcohol. He reports that he does not use drugs.    Medications:   Medications Prior to Admission   Medication Sig Dispense Refill Last Dose   • finasteride (PROSCAR) 5 MG tablet Take 5 mg by mouth Daily.   11/30/2019   • pantoprazole (PROTONIX) 40 MG EC tablet Take 1 tablet by mouth 2 (Two) Times a Day Before Meals. 60 tablet 11 More than a month at Unknown time   • sucralfate (CARAFATE) 1 GM/10ML suspension Take 10 mL by mouth 4 (Four) Times a Day. 3600 mL 0 More than a month at Unknown time   •  "tamsulosin (FLOMAX) 0.4 MG capsule 24 hr capsule Take 1 capsule by mouth Every Night.   Unknown at Unknown time       Allergies:  Patient has no known allergies.    ROS:    General: Weight stable  Resp: No SOA  Cardiovascular: No CP      Objective     /61 (Patient Position: Sitting)   Pulse 90   Temp 97.2 °F (36.2 °C) (Temporal)   Resp 20   Ht 175.3 cm (69\")   Wt 65.8 kg (145 lb)   SpO2 98%   BMI 21.41 kg/m²     Physical Exam   Constitutional: Pt is oriented to person, place, and in no distress.  Eyes: No icterus  ENMT: Unremarkable   Cardiovascular: Normal rate, regular rhythm.    Pulmonary/Chest: No distress.  No audible wheezes  Abdominal: Soft.   Skin: Skin is warm and dry.   Psychiatric: Mood, memory, affect and judgment appear normal.     Assessment/Plan     Diagnosis:  Esophageal stricture    Anticipated Surgical Procedure:  Endoscopy    The risks, benefits, and alternatives of endoscopy were reviewed with the patient today.  Risks including perforation, with or without dilation, possibly requiring surgery.  Additional risks include risk of bleeding.  There is also the risk of a drug reaction or problems with anesthesia.  This will be discussed with the further by the anesthesia team on the day of the procedure. The benefits include the diagnosis and management of disease of the upper digestive tract.  Alternatives to endoscopy include upper GI series, laboratory testing, radiographic evaluation, or no intervention.  The patient verbalizes understanding and agrees.    Much of this encounter note is an electronic transcription/translation of spoken language to printed text. The electronic translation of spoken language may permit erroneous, or at times, nonsensical words or phrases to be inadvertently transcribed; although I have reviewed the note for such errors, some may still exist.  "

## 2019-12-23 ENCOUNTER — ANESTHESIA (OUTPATIENT)
Dept: GASTROENTEROLOGY | Facility: HOSPITAL | Age: 79
End: 2019-12-23

## 2019-12-23 ENCOUNTER — HOSPITAL ENCOUNTER (OUTPATIENT)
Facility: HOSPITAL | Age: 79
Setting detail: HOSPITAL OUTPATIENT SURGERY
Discharge: HOME | End: 2019-12-23
Attending: INTERNAL MEDICINE | Admitting: INTERNAL MEDICINE

## 2019-12-23 ENCOUNTER — ANESTHESIA EVENT (OUTPATIENT)
Dept: GASTROENTEROLOGY | Facility: HOSPITAL | Age: 79
End: 2019-12-23

## 2019-12-23 VITALS
HEIGHT: 67 IN | OXYGEN SATURATION: 96 % | TEMPERATURE: 98.5 F | RESPIRATION RATE: 25 BRPM | BODY MASS INDEX: 22.29 KG/M2 | SYSTOLIC BLOOD PRESSURE: 120 MMHG | WEIGHT: 142 LBS | HEART RATE: 79 BPM | DIASTOLIC BLOOD PRESSURE: 54 MMHG

## 2019-12-23 PROCEDURE — 43249 ESOPH EGD DILATION <30 MM: CPT | Performed by: INTERNAL MEDICINE

## 2019-12-23 PROCEDURE — C1726 CATH, BAL DIL, NON-VASCULAR: HCPCS | Performed by: INTERNAL MEDICINE

## 2019-12-23 PROCEDURE — 25010000002 PROPOFOL 10 MG/ML EMULSION: Performed by: NURSE ANESTHETIST, CERTIFIED REGISTERED

## 2019-12-23 RX ORDER — PROPOFOL 10 MG/ML
VIAL (ML) INTRAVENOUS AS NEEDED
Status: DISCONTINUED | OUTPATIENT
Start: 2019-12-23 | End: 2019-12-23 | Stop reason: SURG

## 2019-12-23 RX ORDER — SODIUM CHLORIDE 9 MG/ML
500 INJECTION, SOLUTION INTRAVENOUS CONTINUOUS PRN
Status: DISCONTINUED | OUTPATIENT
Start: 2019-12-23 | End: 2019-12-23 | Stop reason: HOSPADM

## 2019-12-23 RX ORDER — SODIUM CHLORIDE 0.9 % (FLUSH) 0.9 %
10 SYRINGE (ML) INJECTION AS NEEDED
Status: DISCONTINUED | OUTPATIENT
Start: 2019-12-23 | End: 2019-12-23 | Stop reason: HOSPADM

## 2019-12-23 RX ORDER — SUCRALFATE ORAL 1 G/10ML
1 SUSPENSION ORAL 4 TIMES DAILY
Qty: 3600 ML | Refills: 2 | Status: SHIPPED | OUTPATIENT
Start: 2019-12-23 | End: 2020-08-21 | Stop reason: HOSPADM

## 2019-12-23 RX ORDER — SUCRALFATE 1 G/1
1 TABLET ORAL 4 TIMES DAILY
Qty: 120 TABLET | Refills: 0 | Status: SHIPPED | OUTPATIENT
Start: 2019-12-23 | End: 2020-08-21 | Stop reason: HOSPADM

## 2019-12-23 RX ADMIN — PROPOFOL 50 MG: 10 INJECTION, EMULSION INTRAVENOUS at 08:10

## 2019-12-23 RX ADMIN — PROPOFOL 10 MG: 10 INJECTION, EMULSION INTRAVENOUS at 08:13

## 2019-12-23 RX ADMIN — PROPOFOL 10 MG: 10 INJECTION, EMULSION INTRAVENOUS at 08:12

## 2019-12-23 RX ADMIN — PROPOFOL 10 MG: 10 INJECTION, EMULSION INTRAVENOUS at 08:11

## 2019-12-23 RX ADMIN — SODIUM CHLORIDE 500 ML: 9 INJECTION, SOLUTION INTRAVENOUS at 07:22

## 2019-12-23 NOTE — ANESTHESIA PREPROCEDURE EVALUATION
Anesthesia Evaluation     no history of anesthetic complications:  NPO Solid Status: > 8 hours  NPO Liquid Status: > 8 hours           Airway   Mallampati: II  TM distance: >3 FB  Neck ROM: full  Dental    (+) poor dentition        Pulmonary    (-) asthma, recent URI, sleep apnea, not a smoker  Cardiovascular   Exercise tolerance: good (4-7 METS)    (-) pacemaker, hypertension, past MI, angina, cardiac stents, CABG      Neuro/Psych  (-) seizures, TIA, CVA  GI/Hepatic/Renal/Endo    (+)  hiatal hernia, GERD,  liver disease (Liver mets from colon cancer),   (-) no renal disease, diabetes, no thyroid disorder    ROS Comment: Dysphagia    Musculoskeletal     Abdominal    Substance History      OB/GYN          Other      history of cancer (Colon Cancer) active                      Anesthesia Plan    ASA 3     MAC     intravenous induction     Anesthetic plan, all risks, benefits, and alternatives have been provided, discussed and informed consent has been obtained with: patient.

## 2019-12-23 NOTE — ANESTHESIA POSTPROCEDURE EVALUATION
"Patient: Jonas Snow    Procedure Summary     Date:  12/23/19 Room / Location:  [facility] ENDOSCOPY 6 / [facility] ENDOSCOPY    Anesthesia Start:  0808 Anesthesia Stop:  0825    Procedure:  ESOPHAGOGASTRODUODENOSCOPY WITH ANESTHESIA (N/A ) Diagnosis:       Esophageal stricture      (Esophageal stricture [K22.2])    Surgeon:  Haydee Krishnamurthy MD Provider:  Neal Jimenez CRNA    Anesthesia Type:  MAC ASA Status:  3          Anesthesia Type: MAC    Vitals  Vitals Value Taken Time   /61 12/23/2019  8:45 AM   Temp     Pulse 76 12/23/2019  8:47 AM   Resp 25 12/23/2019  8:40 AM   SpO2 95 % 12/23/2019  8:47 AM   Vitals shown include unvalidated device data.        Post Anesthesia Care and Evaluation    Patient location during evaluation: PACU  Patient participation: complete - patient participated  Level of consciousness: awake and alert  Pain management: adequate  Airway patency: patent  Anesthetic complications: No anesthetic complications    Cardiovascular status: acceptable  Respiratory status: acceptable  Hydration status: acceptable    Comments: Blood pressure 120/54, pulse 79, temperature 98.5 °F (36.9 °C), temperature source Temporal, resp. rate 25, height 170.2 cm (67\"), weight 64.4 kg (142 lb), SpO2 96 %.    Pt discharged from PACU based on robles score >8      "

## 2019-12-23 NOTE — H&P
Chief Complaint:   Esophageal stricture    Subjective     HPI:   He has had esophageal strictures requiring repeated dilations.  Last dilation took place December 2 up to 14 mm.    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)    • Secondary malignant neoplasm of unspecified lung (CMS/HCC)    • Vitamin D deficiency        Past Surgical History:  Past Surgical History:   Procedure Laterality Date   • COLONOSCOPY N/A 1/6/2017    Diverticulosis; Malignant tumor in ascending colon-biopsied; One 20mm polyp in transverse colon-tattooed; Five 5-12mm polyps in transverse colon; One 7mm polyp at splenic flexure; Two 6-10mm polyps in descending colon; Two 5-8mm polyps in sigmoid colon; One 20mm polyp in the sigmoid colon-Clips placed; One 8mm polyp in rectum; Non-bleeding internal hemorrhoids; Repeat 1 year; See path report   • ENDOSCOPY  12/19/2014    HH; Non-obstructing Schatzki ring-dilated; Normal stomach; Normal examined duodenum   • ENDOSCOPY N/A 10/5/2017    LA Grade D esophagitis-biopsied; Esophageal stenosis-dilated; Small HH; Normal stomach; Normal examined duodenum; Repeat 1 month for retreatment   • ENDOSCOPY N/A 11/2/2017    Small HH; Esophageal stenosis-biopsied; Normal stomach; Normal examined duodenum   • ENDOSCOPY N/A 12/11/2017    Medium-sized HH; Low-grade of narrowing and non-obstructing Schatzki ring-dilated; Normal stomach; Normal examined duodenum; No specimens collected; Repeat 4-6 weeks for retreatment   • ENDOSCOPY N/A 1/19/2018    Medium-sized HH; Non-obstructing Schatzki ring-dilated; Normal stomach; Normal examined duodenum; No specimens collected   • ENDOSCOPY N/A 8/31/2018    Benign-appearing esophageal  stenosis-dilated; Small HH; Normal stomach; Normal examined duodenum; No specimens collected; Repeat 2-4 for retreatment   • ENDOSCOPY N/A 9/28/2018    Medium-sized HH; LA Grade D reflux esophagitis-dilated; Normal stomach; Normal examined duodenum; No specimens collected; Repeat 4-6 weeks   • ENDOSCOPY N/A 10/31/2018    Benign-appearing esophageal stenosis-dilated; Medium-sized HH; Normal stomach; Normal examined duodenum; No specimens collected   • ENDOSCOPY  11/18/2014    HH; Benign-appearing esophageal stricture-dilated; Normal stomach; Normal examined duodenum; Repeat 4 weeks for retreatment   • ENDOSCOPY N/A 10/17/2019    Procedure: ESOPHAGOGASTRODUODENOSCOPY WITH ANESTHESIA;  Surgeon: Haydee Krishnamurthy MD;  Location: [facility] ENDOSCOPY;  Service: Gastroenterology   • ENDOSCOPY N/A 11/7/2019    Procedure: ESOPHAGOGASTRODUODENOSCOPY WITH ANESTHESIA;  Surgeon: Haydee Krishnamurthy MD;  Location: [facility] ENDOSCOPY;  Service: Gastroenterology   • ENDOSCOPY N/A 12/2/2019    Procedure: ESOPHAGOGASTRODUODENOSCOPY WITH ANESTHESIA;  Surgeon: Haydee Krishnamurthy MD;  Location: [facility] ENDOSCOPY;  Service: Gastroenterology   • TONSILLECTOMY     • VENOUS ACCESS DEVICE (PORT) INSERTION         Family History:  Family History   Problem Relation Age of Onset   • Colon cancer Neg Hx    • Colon polyps Neg Hx    • Esophageal cancer Neg Hx    • Liver cancer Neg Hx    • Liver disease Neg Hx    • Rectal cancer Neg Hx    • Stomach cancer Neg Hx        Social History:   reports that he quit smoking about 10 years ago. His smoking use included cigarettes. He has never used smokeless tobacco. He reports that he drinks alcohol. He reports that he does not use drugs.    Medications:   Medications Prior to Admission   Medication Sig Dispense Refill Last Dose   • finasteride (PROSCAR) 5 MG tablet Take 5 mg by mouth Daily.   12/22/2019 at 0800   • pantoprazole (PROTONIX) 40 MG EC tablet Take 1 tablet by mouth 2 (Two) Times a Day Before Meals. 60 tablet  "11 12/22/2019 at 0800   • tamsulosin (FLOMAX) 0.4 MG capsule 24 hr capsule Take 1 capsule by mouth Every Night.   12/22/2019 at 0800   • sucralfate (CARAFATE) 1 GM/10ML suspension Take 10 mL by mouth 4 (Four) Times a Day. 3600 mL 0 More than a month at Unknown time       Allergies:  Patient has no known allergies.    ROS:    General: Weight stable  Resp: No SOA  Cardiovascular: No CP      Objective     /61 (BP Location: Right arm, Patient Position: Sitting)   Pulse 85   Temp 98.5 °F (36.9 °C) (Temporal)   Resp 18   Ht 170.2 cm (67\")   Wt 64.4 kg (142 lb)   SpO2 100%   BMI 22.24 kg/m²     Physical Exam   Constitutional: Pt is oriented to person, place, and in no distress.  Eyes: No icterus  ENMT: Unremarkable   Cardiovascular: Normal rate, regular rhythm.    Pulmonary/Chest: No distress.  No audible wheezes  Abdominal: Soft.   Skin: Skin is warm and dry.   Psychiatric: Mood, memory, affect and judgment appear normal.     Assessment/Plan     Diagnosis:  Esophageal stricture    Anticipated Surgical Procedure:  Endoscopy    The risks, benefits, and alternatives of endoscopy were reviewed with the patient today.  Risks including perforation, with or without dilation, possibly requiring surgery.  Additional risks include risk of bleeding.  There is also the risk of a drug reaction or problems with anesthesia.  This will be discussed with the further by the anesthesia team on the day of the procedure. The benefits include the diagnosis and management of disease of the upper digestive tract.  Alternatives to endoscopy include upper GI series, laboratory testing, radiographic evaluation, or no intervention.  The patient verbalizes understanding and agrees.    Much of this encounter note is an electronic transcription/translation of spoken language to printed text. The electronic translation of spoken language may permit erroneous, or at times, nonsensical words or phrases to be inadvertently transcribed; " although I have reviewed the note for such errors, some may still exist.

## 2020-04-13 ENCOUNTER — TELEPHONE (OUTPATIENT)
Dept: GASTROENTEROLOGY | Facility: CLINIC | Age: 80
End: 2020-04-13

## 2020-04-13 NOTE — TELEPHONE ENCOUNTER
"Pt was scheduled this morning for a telephone visit with Litzy for rectal bleeding. When Vipin called him to check him in-he was very rude/cussed at her and told her he didn't need this OV.     She tried to transfer him to me to discuss further but pt hung up before he could be transferred. I called him back and spoke to him-he tells me that we need to leave him alone as he is \"home recovering\". Apparently he was in hospital in [place], [place] and tells me \"Dr. Anna took care of me and stretched my throat a couple of days ago\". I tried to explain to him that his visit today was for rectal bleeding-he tells me \"that has stopped-I'm not talking to you about this anymore.\"    I asked if he wanted to just cancel his appt and he states yes. I will have Vipin cancel appt. I called [facility] to let them know-spoke to Vidhi ERWIN   "

## 2020-08-20 ENCOUNTER — APPOINTMENT (OUTPATIENT)
Dept: CT IMAGING | Facility: HOSPITAL | Age: 80
End: 2020-08-20

## 2020-08-20 ENCOUNTER — HOSPITAL ENCOUNTER (INPATIENT)
Facility: HOSPITAL | Age: 80
LOS: 1 days | Discharge: HOSPICE | End: 2020-08-21
Attending: EMERGENCY MEDICINE | Admitting: FAMILY MEDICINE

## 2020-08-20 DIAGNOSIS — C18.9 MALIGNANT NEOPLASM OF COLON, UNSPECIFIED PART OF COLON (HCC): ICD-10-CM

## 2020-08-20 DIAGNOSIS — C78.00 CARCINOMA OF COLON METASTATIC TO LUNG (HCC): Primary | ICD-10-CM

## 2020-08-20 DIAGNOSIS — R11.2 NAUSEA AND VOMITING, INTRACTABILITY OF VOMITING NOT SPECIFIED, UNSPECIFIED VOMITING TYPE: ICD-10-CM

## 2020-08-20 DIAGNOSIS — T45.1X5A CHEMOTHERAPY INDUCED NAUSEA AND VOMITING: ICD-10-CM

## 2020-08-20 DIAGNOSIS — K22.2 ESOPHAGEAL STRICTURE: ICD-10-CM

## 2020-08-20 DIAGNOSIS — R13.10 DYSPHAGIA, UNSPECIFIED TYPE: ICD-10-CM

## 2020-08-20 DIAGNOSIS — E46 PROTEIN-CALORIE MALNUTRITION, UNSPECIFIED SEVERITY (HCC): ICD-10-CM

## 2020-08-20 DIAGNOSIS — C18.9 CARCINOMA OF COLON METASTATIC TO LUNG (HCC): Primary | ICD-10-CM

## 2020-08-20 DIAGNOSIS — Z87.19 HISTORY OF ESOPHAGITIS: ICD-10-CM

## 2020-08-20 DIAGNOSIS — K44.9 HIATAL HERNIA: ICD-10-CM

## 2020-08-20 DIAGNOSIS — S22.41XS CLOSED FRACTURE OF MULTIPLE RIBS OF RIGHT SIDE, SEQUELA: ICD-10-CM

## 2020-08-20 DIAGNOSIS — Z87.19 HISTORY OF ESOPHAGEAL STRICTURE: ICD-10-CM

## 2020-08-20 DIAGNOSIS — S22.049A CLOSED FRACTURE OF FOURTH THORACIC VERTEBRA, UNSPECIFIED FRACTURE MORPHOLOGY, INITIAL ENCOUNTER (HCC): ICD-10-CM

## 2020-08-20 DIAGNOSIS — D64.81 ANEMIA DUE TO CHEMOTHERAPY: ICD-10-CM

## 2020-08-20 DIAGNOSIS — R63.4 WEIGHT LOSS: ICD-10-CM

## 2020-08-20 DIAGNOSIS — K21.9 GASTROESOPHAGEAL REFLUX DISEASE WITHOUT ESOPHAGITIS: ICD-10-CM

## 2020-08-20 DIAGNOSIS — B88.8 INFESTATION BY BED BUG: ICD-10-CM

## 2020-08-20 DIAGNOSIS — S32.010A CLOSED COMPRESSION FRACTURE OF BODY OF L1 VERTEBRA (HCC): ICD-10-CM
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
## 2020-08-20 DIAGNOSIS — R11.11 NON-INTRACTABLE VOMITING WITHOUT NAUSEA, UNSPECIFIED VOMITING TYPE: ICD-10-CM

## 2020-08-20 DIAGNOSIS — S32.039A CLOSED FRACTURE OF THIRD LUMBAR VERTEBRA, UNSPECIFIED FRACTURE MORPHOLOGY, INITIAL ENCOUNTER (HCC): ICD-10-CM

## 2020-08-20 DIAGNOSIS — R13.19 OTHER DYSPHAGIA: ICD-10-CM

## 2020-08-20 DIAGNOSIS — K22.2 SCHATZKI'S RING: ICD-10-CM

## 2020-08-20 DIAGNOSIS — K92.1 GASTROINTESTINAL HEMORRHAGE WITH MELENA: ICD-10-CM

## 2020-08-20 DIAGNOSIS — K63.5 POLYP OF COLON, UNSPECIFIED PART OF COLON, UNSPECIFIED TYPE: ICD-10-CM

## 2020-08-20 DIAGNOSIS — C22.9 MALIGNANT NEOPLASM OF LIVER, UNSPECIFIED LIVER MALIGNANCY TYPE (HCC): ICD-10-CM

## 2020-08-20 DIAGNOSIS — T45.1X5A ANEMIA DUE TO CHEMOTHERAPY: ICD-10-CM

## 2020-08-20 DIAGNOSIS — R11.2 CHEMOTHERAPY INDUCED NAUSEA AND VOMITING: ICD-10-CM

## 2020-08-20 DIAGNOSIS — S32.039S CLOSED FRACTURE OF THIRD LUMBAR VERTEBRA, UNSPECIFIED FRACTURE MORPHOLOGY, SEQUELA: ICD-10-CM

## 2020-08-20 PROBLEM — S22.49XA RIB FRACTURES: Status: ACTIVE | Noted: 2020-08-20

## 2020-08-20 LAB
ALBUMIN SERPL-MCNC: 2.5 G/DL (ref 3.5–5.2)
ALBUMIN/GLOB SERPL: 0.7 G/DL
ALP SERPL-CCNC: 285 U/L (ref 39–117)
ALT SERPL W P-5'-P-CCNC: 21 U/L (ref 1–41)
ANION GAP SERPL CALCULATED.3IONS-SCNC: 21 MMOL/L (ref 5–15)
AST SERPL-CCNC: 66 U/L (ref 1–40)
BASOPHILS # BLD AUTO: 0.02 10*3/MM3 (ref 0–0.2)
BASOPHILS NFR BLD AUTO: 0.2 % (ref 0–1.5)
BILIRUB SERPL-MCNC: 1.3 MG/DL (ref 0–1.2)
BUN SERPL-MCNC: 27 MG/DL (ref 8–23)
BUN/CREAT SERPL: 30 (ref 7–25)
CALCIUM SPEC-SCNC: 8.9 MG/DL (ref 8.6–10.5)
CHLORIDE SERPL-SCNC: 98 MMOL/L (ref 98–107)
CO2 SERPL-SCNC: 22 MMOL/L (ref 22–29)
CREAT SERPL-MCNC: 0.9 MG/DL (ref 0.76–1.27)
DEPRECATED RDW RBC AUTO: 58.5 FL (ref 37–54)
EOSINOPHIL # BLD AUTO: 0 10*3/MM3 (ref 0–0.4)
EOSINOPHIL NFR BLD AUTO: 0 % (ref 0.3–6.2)
ERYTHROCYTE [DISTWIDTH] IN BLOOD BY AUTOMATED COUNT: 18.8 % (ref 12.3–15.4)
GFR SERPL CREATININE-BSD FRML MDRD: 82 ML/MIN/1.73
GLOBULIN UR ELPH-MCNC: 3.4 GM/DL
GLUCOSE SERPL-MCNC: 71 MG/DL (ref 65–99)
HCT VFR BLD AUTO: 35.5 % (ref 37.5–51)
HGB BLD-MCNC: 11.2 G/DL (ref 13–17.7)
IMM GRANULOCYTES # BLD AUTO: 0.02 10*3/MM3 (ref 0–0.05)
IMM GRANULOCYTES NFR BLD AUTO: 0.2 % (ref 0–0.5)
LYMPHOCYTES # BLD AUTO: 0.81 10*3/MM3 (ref 0.7–3.1)
LYMPHOCYTES NFR BLD AUTO: 9.5 % (ref 19.6–45.3)
MCH RBC QN AUTO: 27.3 PG (ref 26.6–33)
MCHC RBC AUTO-ENTMCNC: 31.5 G/DL (ref 31.5–35.7)
MCV RBC AUTO: 86.4 FL (ref 79–97)
MONOCYTES # BLD AUTO: 0.76 10*3/MM3 (ref 0.1–0.9)
MONOCYTES NFR BLD AUTO: 8.9 % (ref 5–12)
NEUTROPHILS NFR BLD AUTO: 6.96 10*3/MM3 (ref 1.7–7)
NEUTROPHILS NFR BLD AUTO: 81.2 % (ref 42.7–76)
NRBC BLD AUTO-RTO: 0 /100 WBC (ref 0–0.2)
PLATELET # BLD AUTO: 123 10*3/MM3 (ref 140–450)
PMV BLD AUTO: 9.4 FL (ref 6–12)
POTASSIUM SERPL-SCNC: 3.9 MMOL/L (ref 3.5–5.2)
PROT SERPL-MCNC: 5.9 G/DL (ref 6–8.5)
RBC # BLD AUTO: 4.11 10*6/MM3 (ref 4.14–5.8)
SARS-COV-2 RDRP RESP QL NAA+PROBE: NOT DETECTED
SODIUM SERPL-SCNC: 141 MMOL/L (ref 136–145)
WBC # BLD AUTO: 8.57 10*3/MM3 (ref 3.4–10.8)

## 2020-08-20 PROCEDURE — 99285 EMERGENCY DEPT VISIT HI MDM: CPT

## 2020-08-20 PROCEDURE — 87635 SARS-COV-2 COVID-19 AMP PRB: CPT | Performed by: EMERGENCY MEDICINE

## 2020-08-20 PROCEDURE — 99222 1ST HOSP IP/OBS MODERATE 55: CPT | Performed by: NURSE PRACTITIONER

## 2020-08-20 PROCEDURE — 72131 CT LUMBAR SPINE W/O DYE: CPT

## 2020-08-20 PROCEDURE — 72125 CT NECK SPINE W/O DYE: CPT

## 2020-08-20 PROCEDURE — 25010000002 MORPHINE PER 10 MG: Performed by: EMERGENCY MEDICINE

## 2020-08-20 PROCEDURE — 70450 CT HEAD/BRAIN W/O DYE: CPT

## 2020-08-20 PROCEDURE — 85025 COMPLETE CBC W/AUTO DIFF WBC: CPT | Performed by: EMERGENCY MEDICINE

## 2020-08-20 PROCEDURE — 80053 COMPREHEN METABOLIC PANEL: CPT | Performed by: EMERGENCY MEDICINE

## 2020-08-20 PROCEDURE — 72128 CT CHEST SPINE W/O DYE: CPT

## 2020-08-20 RX ORDER — SODIUM CHLORIDE, SODIUM LACTATE, POTASSIUM CHLORIDE, CALCIUM CHLORIDE 600; 310; 30; 20 MG/100ML; MG/100ML; MG/100ML; MG/100ML
100 INJECTION, SOLUTION INTRAVENOUS CONTINUOUS
Status: DISCONTINUED | OUTPATIENT
Start: 2020-08-20 | End: 2020-08-21 | Stop reason: HOSPADM

## 2020-08-20 RX ORDER — SODIUM CHLORIDE 0.9 % (FLUSH) 0.9 %
10 SYRINGE (ML) INJECTION AS NEEDED
Status: DISCONTINUED | OUTPATIENT
Start: 2020-08-20 | End: 2020-08-21 | Stop reason: HOSPADM

## 2020-08-20 RX ORDER — FAMOTIDINE 10 MG/ML
20 INJECTION, SOLUTION INTRAVENOUS 2 TIMES DAILY
Status: DISCONTINUED | OUTPATIENT
Start: 2020-08-20 | End: 2020-08-21 | Stop reason: HOSPADM

## 2020-08-20 RX ORDER — ONDANSETRON 2 MG/ML
4 INJECTION INTRAMUSCULAR; INTRAVENOUS EVERY 6 HOURS PRN
Status: DISCONTINUED | OUTPATIENT
Start: 2020-08-20 | End: 2020-08-21 | Stop reason: HOSPADM

## 2020-08-20 RX ORDER — SODIUM CHLORIDE 0.9 % (FLUSH) 0.9 %
10 SYRINGE (ML) INJECTION EVERY 12 HOURS SCHEDULED
Status: DISCONTINUED | OUTPATIENT
Start: 2020-08-20 | End: 2020-08-21 | Stop reason: HOSPADM

## 2020-08-20 RX ORDER — MORPHINE SULFATE 2 MG/ML
2 INJECTION, SOLUTION INTRAMUSCULAR; INTRAVENOUS
Status: DISCONTINUED | OUTPATIENT
Start: 2020-08-20 | End: 2020-08-21 | Stop reason: HOSPADM

## 2020-08-20 RX ADMIN — FAMOTIDINE 20 MG: 10 INJECTION INTRAVENOUS at 20:28

## 2020-08-20 RX ADMIN — SODIUM CHLORIDE, POTASSIUM CHLORIDE, SODIUM LACTATE AND CALCIUM CHLORIDE 100 ML/HR: 600; 310; 30; 20 INJECTION, SOLUTION INTRAVENOUS at 20:00

## 2020-08-20 RX ADMIN — MORPHINE SULFATE 4 MG: 4 INJECTION, SOLUTION INTRAMUSCULAR; INTRAVENOUS at 13:29

## 2020-08-20 NOTE — ED NOTES
[facility] with [facility] who then transferred me to palliative care nurse Jasmyne. She states that the patient will have hospice care arranged through [facility] to who she will try to get into contact with and see when they will be able to go to the patients home and evaluate him.  She also states that she has been in contact today with the son regarding what kind of hospice care they want (whether that be at home, nursing home, or inpatient at a facility).  Patient has been adamant throughout his care that he wants to die at home, the palliative care nurse said for this to be done that the patient would have to have a 24/7 caregiver live at the home with him and she wasn't sure that the son or grandson had committed to this yet.       Sadia Boggs RN  08/20/20 1234       Sadia Boggs RN  08/20/20 124

## 2020-08-20 NOTE — ED PROVIDER NOTES
Subjective   Patient is a 80-year-old male who presents to the ER status post a fall.  Patient has a history of colon cancer and is pretty much bedbound at this point.  Patient states he accidentally slid out of bed this morning and hit his head on the ground.  He denies any loss of consciousness.  He does not take blood thinners.  Patient has had a mild headache since that time but denies any other pain or injury from the fall.  Patient does have chronic pain from his colon cancer but family states he refuses pain medication.  They are trying to get him in hospice.  Patient denies any fever, chest pain, shortness of air, abdominal pain, nausea vomiting diarrhea, urinary changes, neuro logic changes, neck or back pain, extremity pain.          Review of Systems   Constitutional: Negative.    Eyes: Negative.    Respiratory: Negative.    Cardiovascular: Negative.    Gastrointestinal: Negative.    Endocrine: Negative.    Genitourinary: Negative.    Musculoskeletal: Negative.    Skin: Negative.    Allergic/Immunologic: Negative.    Neurological: Positive for headaches.   Hematological: Negative.    Psychiatric/Behavioral: Negative.    All other systems reviewed and are negative.      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)    • Secondary malignant neoplasm of unspecified lung (CMS/HCC)    • Type 2 diabetes mellitus (CMS/HCC)    • Vitamin D deficiency        No Known Allergies    Past Surgical History:   Procedure Laterality Date   • COLONOSCOPY N/A 1/6/2017    Diverticulosis; Malignant tumor in ascending colon-biopsied; One 20mm polyp in transverse colon-tattooed; Five 5-12mm polyps in  transverse colon; One 7mm polyp at splenic flexure; Two 6-10mm polyps in descending colon; Two 5-8mm polyps in sigmoid colon; One 20mm polyp in the sigmoid colon-Clips placed; One 8mm polyp in rectum; Non-bleeding internal hemorrhoids; Repeat 1 year; See path report   • ENDOSCOPY  12/19/2014    HH; Non-obstructing Schatzki ring-dilated; Normal stomach; Normal examined duodenum   • ENDOSCOPY N/A 10/5/2017    LA Grade D esophagitis-biopsied; Esophageal stenosis-dilated; Small HH; Normal stomach; Normal examined duodenum; Repeat 1 month for retreatment   • ENDOSCOPY N/A 11/2/2017    Small HH; Esophageal stenosis-biopsied; Normal stomach; Normal examined duodenum   • ENDOSCOPY N/A 12/11/2017    Medium-sized HH; Low-grade of narrowing and non-obstructing Schatzki ring-dilated; Normal stomach; Normal examined duodenum; No specimens collected; Repeat 4-6 weeks for retreatment   • ENDOSCOPY N/A 1/19/2018    Medium-sized HH; Non-obstructing Schatzki ring-dilated; Normal stomach; Normal examined duodenum; No specimens collected   • ENDOSCOPY N/A 8/31/2018    Benign-appearing esophageal stenosis-dilated; Small HH; Normal stomach; Normal examined duodenum; No specimens collected; Repeat 2-4 for retreatment   • ENDOSCOPY N/A 9/28/2018    Medium-sized HH; LA Grade D reflux esophagitis-dilated; Normal stomach; Normal examined duodenum; No specimens collected; Repeat 4-6 weeks   • ENDOSCOPY N/A 10/31/2018    Benign-appearing esophageal stenosis-dilated; Medium-sized HH; Normal stomach; Normal examined duodenum; No specimens collected   • ENDOSCOPY  11/18/2014    HH; Benign-appearing esophageal stricture-dilated; Normal stomach; Normal examined duodenum; Repeat 4 weeks for retreatment   • ENDOSCOPY N/A 10/17/2019    Benign-appearing esophageal stenosis-dilated; Small HH; Normal stomach; Normal examined duodenum; No specimens collected; Repeat 3 weeks   • ENDOSCOPY N/A 11/7/2019    Small HH; Benign-appearing esophageal  stenosis-dilated; Normal stomach; Normal examined duodenum; No specimens collected; Repeat 3 weeks   • ENDOSCOPY N/A 2019    Benign-appearing esophageal stenosis-dilated; Medium-sized HH; Normal stomach; Normal examined duodenum; No specimens collected; Repeat 3 weeks   • ENDOSCOPY N/A 2019    Small HH; LA Grade D reflux esophagitis; Benign-appearing esophageal stenosis-dilated; Normal stomach; Normal examined duodenum; No specimens collected   • TONSILLECTOMY     • VENOUS ACCESS DEVICE (PORT) INSERTION         Family History   Problem Relation Age of Onset   • Colon cancer Neg Hx    • Colon polyps Neg Hx    • Esophageal cancer Neg Hx    • Liver cancer Neg Hx    • Liver disease Neg Hx    • Rectal cancer Neg Hx    • Stomach cancer Neg Hx        Social History     Socioeconomic History   • Marital status:      Spouse name: Not on file   • Number of children: Not on file   • Years of education: Not on file   • Highest education level: Not on file   Tobacco Use   • Smoking status: Former Smoker     Types: Cigarettes     Last attempt to quit: 2009     Years since quittin.6   • Smokeless tobacco: Never Used   Substance and Sexual Activity   • Alcohol use: Yes     Comment: Occasionally   • Drug use: No   • Sexual activity: Defer           Objective   Physical Exam   Constitutional: He is oriented to person, place, and time. He appears cachectic.   HENT:   Head: Normocephalic and atraumatic.   Eyes: Pupils are equal, round, and reactive to light. Conjunctivae are normal.   Neck: Normal range of motion.   Cardiovascular: Normal rate, regular rhythm and normal heart sounds.   Pulmonary/Chest: Effort normal and breath sounds normal.   Abdominal: Soft. There is no tenderness.   Musculoskeletal: Normal range of motion. He exhibits no edema or deformity.   Nontender to palpation throughout including CT and L spines and all extremities, normal range of motion, neurovascularly intact   Neurological: He is  alert and oriented to person, place, and time. He has normal strength.   Skin: Skin is warm.   Psychiatric: He has a normal mood and affect. His behavior is normal.   Nursing note and vitals reviewed.      Procedures           ED Course      Social work was notified.  Although the patient had no loss of consciousness, due to his age, mechanism of injury and headache, a CT scan of the head was ordered.     Lab Results (last 24 hours)     Procedure Component Value Units Date/Time    CBC & Differential [[id number]] Collected:  08/20/20 1037    Specimen:  Blood from Arm, Right Updated:  08/20/20 1051    Narrative:       The following orders were created for panel order CBC & Differential.  Procedure                               Abnormality         Status                     ---------                               -----------         ------                     CBC Auto Differential[[id number]]        Abnormal            Final result                 Please view results for these tests on the individual orders.    Comprehensive Metabolic Panel [[id number]]  (Abnormal) Collected:  08/20/20 1037    Specimen:  Blood from Arm, Right Updated:  08/20/20 1108     Glucose 71 mg/dL      BUN 27 mg/dL      Creatinine 0.90 mg/dL      Sodium 141 mmol/L      Potassium 3.9 mmol/L      Chloride 98 mmol/L      CO2 22.0 mmol/L      Calcium 8.9 mg/dL      Total Protein 5.9 g/dL      Albumin 2.50 g/dL      ALT (SGPT) 21 U/L      AST (SGOT) 66 U/L      Alkaline Phosphatase 285 U/L      Total Bilirubin 1.3 mg/dL      eGFR Non African Amer 82 mL/min/1.73      Globulin 3.4 gm/dL      A/G Ratio 0.7 g/dL      BUN/Creatinine Ratio 30.0     Anion Gap 21.0 mmol/L     Narrative:       GFR Normal >60  Chronic Kidney Disease <60  Kidney Failure <15      CBC Auto Differential [[id number]]  (Abnormal) Collected:  08/20/20 1037    Specimen:  Blood from Arm, Right Updated:  08/20/20 1051     WBC 8.57 10*3/mm3      RBC 4.11 10*6/mm3      Hemoglobin 11.2 g/dL       Hematocrit 35.5 %      MCV 86.4 fL      MCH 27.3 pg      MCHC 31.5 g/dL      RDW 18.8 %      RDW-SD 58.5 fl      MPV 9.4 fL      Platelets 123 10*3/mm3      Neutrophil % 81.2 %      Lymphocyte % 9.5 %      Monocyte % 8.9 %      Eosinophil % 0.0 %      Basophil % 0.2 %      Immature Grans % 0.2 %      Neutrophils, Absolute 6.96 10*3/mm3      Lymphocytes, Absolute 0.81 10*3/mm3      Monocytes, Absolute 0.76 10*3/mm3      Eosinophils, Absolute 0.00 10*3/mm3      Basophils, Absolute 0.02 10*3/mm3      Immature Grans, Absolute 0.02 10*3/mm3      nRBC 0.0 /100 WBC         CT Head Without Contrast   Final Result   1. No acute intracranial findings.    2. Moderate chronic microvascular changes and volume loss.   3. Patchy mineralization of the calvarium, could be seen with   osteopenia, but infiltrative process such as multiple myeloma not   excluded. Recommend correlation with patient history.   This report was finalized on 08/20/2020 11:14 by Dr Keely Mandel MD.      CT Cervical Spine Without Contrast   Final Result   1. No evidence of acute osseous injury in the cervical spine.       This report was finalized on 08/20/2020 11:18 by Dr. Neal Leon MD.      CT Thoracic Spine Without Contrast   Final Result   1. Minimal age indeterminate central height loss of T4. Correlate with   patient's symptoms.   2. Subacute appearing fractures of the posterior right 10th and 11th   ribs.   3. Multiple pulmonary nodules, which appear increased compared to PET/CT   9/24/2018, concerning for progression of metastatic disease. Consider   comparison with interval outside imaging.   4. Descending thoracic aorta enlarged measuring 3.3 cm at the level of   the diaphragm.   This report was finalized on 08/20/2020 11:21 by Dr Keely Mandel MD.      CT Lumbar Spine Without Contrast   Final Result   1. When compared with the most recent exam, there is a new superior   endplate deformity of the L3 vertebral body involving the anterior  and   middle columns. This is considered a possibly unstable fracture.       2. Stable compression deformity at L1.       Findings were called to Dr. Mancuso in the emergency department at 11:24   AM on 8/20/2020.       This report was finalized on 08/20/2020 11:25 by Dr. Neal Leon MD.        Labs showed mildly elevated LFTs.  CT scan of the head showed no acute findings.  Patient did have some patchy mineralization of the calvarium that is most likely osteopenia.  CT scan of the C-spine showed no acute findings.  CT scan of the T-spine showed a age-indeterminate height loss at T4 as well as subacute fractures of the posterior right 10th and 11th ribs.  Patient had multiple pulmonary nodules consistent with his metastatic disease.  His descending thoracic aorta was also enlarged.  CT scan of the L-spine showed new superior endplate deformity of L3.  He also had a stable compression deformity at L1.  Dr. Wilks with neurosurgery was consulted.  He did not feel that the patient was a surgical candidate at this time.  An LSO brace was recommended and applied to the patient.  Family did not feel like they can care for this patient.  Hospice was consulted and patient did not meet criteria for inpatient hospice.  They have discussed the patient's case with social work and patient will eventually be placed in a nursing home.  Patient was then admitted for pain control and further treatment of his lumbar fracture to the hospitalist service.                                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

## 2020-08-20 NOTE — ED NOTES
Talked with patients son and grandson over the phone.  They state that the patient has colon cancer and has been getting progressively weaker at home.  Patient lives at home by himself.  The family has been trying to get hospice to come and evaluate patient, they were supposed to come today and be evaluated by [facility] but when the grandson went to check on the patient this morning he was was found in the floor at his home.  He had tried to get up sometime in the night and fell, he did hit his head but denies any injury.  During exam patient complains of a headache, has some skin tears to his arms, and complains of pain on palpation of his back.       Sadia Boggs RN  08/20/20 1002

## 2020-08-20 NOTE — PROGRESS NOTES
CALL FROM ER BY NURSE MCCARTY ABOUT PT SEE HOSPICE. SHE SAID THAT THE PT FAMILY STATED WAS SUPPOSE TO SEE HOSPICE YESTERDAY BUT GOT CANCELLED. I TOLD HER TO PUT A PALLIVATIVE CARE CONSULT ORDER. I ALSO TALKED WITH [facility] AND SHE STATED THAT PT IS NOT IN THEIR SYSTEM. [facility] AND [facility] LIKELY [facility]ING THROUGH THEM AND IT HAS TO BE APPROVED. CALLED [facility] BACK AND GAVE HER THIS INFORMATION. WILL CONTINUE TO FOLLOW-FAITH CROWE RN  8/20 @ 2315

## 2020-08-20 NOTE — H&P
[facility] Medicine Services  HISTORY AND PHYSICAL    Date of Admission: 8/20/2020  Primary Care Physician: Samia Vargas MD    Subjective     Chief Complaint: Colon cancer with lung mets-worsening, L3 possible unstable fracture, L1 compression fracture, right rib fracture-10-11 ribs    History of Present Illness  Patient is a 80-year-old  male presented ER for frequent fall.  Patient also complained of back pain.  History of colon cancer with mets to the liver.  Patient has been falling at home.  Patient is mostly bedbound at home.  CT scan shows L3 fracture possible unstable, compression fracture of L1, rib fracture right rib 10th-11 ribs.  Patient denies any loss of consciousness.  Patient did hit his head on the ground this morning.  Did complain of mild headaches and back pain from falling.  Patient is on chronic pain medication due to colon cancer, patient does not take his pain medication at home as directed.  Family wants patient in hospice.    Review of Systems   Unable to obtain due to altered mental status.  Patient denies any chest pain.  Patient no acute distress.  Otherwise as stated above.    Otherwise complete ROS reviewed and negative except as mentioned in the HPI.      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)    • Secondary malignant neoplasm of unspecified lung (CMS/HCC)    • Type 2 diabetes mellitus (CMS/HCC)    • Vitamin D deficiency        Past Surgical History:  Past Surgical History:   Procedure Laterality Date   • COLONOSCOPY N/A 1/6/2017    Diverticulosis; Malignant tumor in ascending  colon-biopsied; One 20mm polyp in transverse colon-tattooed; Five 5-12mm polyps in transverse colon; One 7mm polyp at splenic flexure; Two 6-10mm polyps in descending colon; Two 5-8mm polyps in sigmoid colon; One 20mm polyp in the sigmoid colon-Clips placed; One 8mm polyp in rectum; Non-bleeding internal hemorrhoids; Repeat 1 year; See path report   • ENDOSCOPY  12/19/2014    HH; Non-obstructing Schatzki ring-dilated; Normal stomach; Normal examined duodenum   • ENDOSCOPY N/A 10/5/2017    LA Grade D esophagitis-biopsied; Esophageal stenosis-dilated; Small HH; Normal stomach; Normal examined duodenum; Repeat 1 month for retreatment   • ENDOSCOPY N/A 11/2/2017    Small HH; Esophageal stenosis-biopsied; Normal stomach; Normal examined duodenum   • ENDOSCOPY N/A 12/11/2017    Medium-sized HH; Low-grade of narrowing and non-obstructing Schatzki ring-dilated; Normal stomach; Normal examined duodenum; No specimens collected; Repeat 4-6 weeks for retreatment   • ENDOSCOPY N/A 1/19/2018    Medium-sized HH; Non-obstructing Schatzki ring-dilated; Normal stomach; Normal examined duodenum; No specimens collected   • ENDOSCOPY N/A 8/31/2018    Benign-appearing esophageal stenosis-dilated; Small HH; Normal stomach; Normal examined duodenum; No specimens collected; Repeat 2-4 for retreatment   • ENDOSCOPY N/A 9/28/2018    Medium-sized HH; LA Grade D reflux esophagitis-dilated; Normal stomach; Normal examined duodenum; No specimens collected; Repeat 4-6 weeks   • ENDOSCOPY N/A 10/31/2018    Benign-appearing esophageal stenosis-dilated; Medium-sized HH; Normal stomach; Normal examined duodenum; No specimens collected   • ENDOSCOPY  11/18/2014    HH; Benign-appearing esophageal stricture-dilated; Normal stomach; Normal examined duodenum; Repeat 4 weeks for retreatment   • ENDOSCOPY N/A 10/17/2019    Benign-appearing esophageal stenosis-dilated; Small HH; Normal stomach; Normal examined duodenum; No specimens collected; Repeat 3 weeks  "  • ENDOSCOPY N/A 11/7/2019    Small HH; Benign-appearing esophageal stenosis-dilated; Normal stomach; Normal examined duodenum; No specimens collected; Repeat 3 weeks   • ENDOSCOPY N/A 12/2/2019    Benign-appearing esophageal stenosis-dilated; Medium-sized HH; Normal stomach; Normal examined duodenum; No specimens collected; Repeat 3 weeks   • ENDOSCOPY N/A 12/23/2019    Small HH; LA Grade D reflux esophagitis; Benign-appearing esophageal stenosis-dilated; Normal stomach; Normal examined duodenum; No specimens collected   • TONSILLECTOMY     • VENOUS ACCESS DEVICE (PORT) INSERTION         Family History: family history includes Aneurysm in his father; No Known Problems in his mother.    Social History:  reports that he quit smoking about 11 years ago. His smoking use included cigarettes. He has never used smokeless tobacco. He reports that he drinks alcohol. He reports that he does not use drugs.    Medications:  Prior to Admission medications    Medication Sig Start Date End Date Taking? Authorizing Provider   finasteride (PROSCAR) 5 MG tablet Take 5 mg by mouth Daily.    ProviderYves MD   pantoprazole (PROTONIX) 40 MG EC tablet Take 1 tablet by mouth 2 (Two) Times a Day Before Meals. 10/5/17   Haydee Krishnamurthy MD   sucralfate (CARAFATE) 1 g tablet Take 1 tablet by mouth 4 (Four) Times a Day. 12/23/19   Haydee Krishnamurthy MD   sucralfate (CARAFATE) 1 GM/10ML suspension Take 10 mL by mouth 4 (Four) Times a Day. 12/23/19   Haydee Krishnamurthy MD   tamsulosin (FLOMAX) 0.4 MG capsule 24 hr capsule Take 1 capsule by mouth Every Night.    Provider, MD Yves     Allergies:  No Known Allergies    Objective     Vital Signs: /78   Pulse 93   Temp 97.7 °F (36.5 °C) (Oral)   Resp 26   Ht 182.9 cm (72\")   Wt 66.7 kg (147 lb)   SpO2 95%   BMI 19.94 kg/m²   Physical Exam   Constitutional: He appears well-developed.   Cachectic.   HENT:   Head: Normocephalic.   Eyes: Pupils are equal, round, and reactive to " light. Conjunctivae are normal.   Neck: Neck supple. No JVD present.   Cardiovascular: Normal rate, regular rhythm, normal heart sounds and intact distal pulses. Exam reveals no gallop and no friction rub.   No murmur heard.  Pulmonary/Chest: No respiratory distress. He has no wheezes. He has no rales. He exhibits no tenderness.   Diminished breath sound bilateral, clear.   Abdominal: Soft. Bowel sounds are normal. He exhibits no distension. There is no tenderness. There is no rebound and no guarding.   Musculoskeletal: He exhibits no edema, tenderness or deformity.   Patient is curled up in fetal position.   Neurological: He displays normal reflexes. No cranial nerve deficit. He exhibits abnormal muscle tone. Coordination abnormal.   Skin: Skin is warm and dry. Capillary refill takes 2 to 3 seconds. No rash noted.   Psychiatric: He has a normal mood and affect.   Nursing note and vitals reviewed.          Results Reviewed:    Lab Results (last 24 hours)     Procedure Component Value Units Date/Time    COVID PRE-OP / PRE-PROCEDURE SCREENING ORDER (NO ISOLATION) - Swab, Nasopharynx [[id number]] Collected:  08/20/20 1701    Specimen:  Swab from Nasopharynx Updated:  08/20/20 1721    Narrative:       The following orders were created for panel order COVID PRE-OP / PRE-PROCEDURE SCREENING ORDER (NO ISOLATION) - Swab, Nasopharynx.  Procedure                               Abnormality         Status                     ---------                               -----------         ------                     COVID-19 [facility]BBOTT IN-HOUS...[[id number]]                      In process                   Please view results for these tests on the individual orders.    COVID-19 [facility]BBOTT IN-HOUSE,NP Swab (NO TRANSPORT MEDIA) 2 HR TAT - Swab, Nasopharynx [[id number]] Collected:  08/20/20 1701    Specimen:  Swab from Nasopharynx Updated:  08/20/20 1721    Comprehensive Metabolic Panel [[id number]]  (Abnormal) Collected:  08/20/20 1037     Specimen:  Blood from Arm, Right Updated:  08/20/20 1108     Glucose 71 mg/dL      BUN 27 mg/dL      Creatinine 0.90 mg/dL      Sodium 141 mmol/L      Potassium 3.9 mmol/L      Chloride 98 mmol/L      CO2 22.0 mmol/L      Calcium 8.9 mg/dL      Total Protein 5.9 g/dL      Albumin 2.50 g/dL      ALT (SGPT) 21 U/L      AST (SGOT) 66 U/L      Alkaline Phosphatase 285 U/L      Total Bilirubin 1.3 mg/dL      eGFR Non African Amer 82 mL/min/1.73      Globulin 3.4 gm/dL      A/G Ratio 0.7 g/dL      BUN/Creatinine Ratio 30.0     Anion Gap 21.0 mmol/L     Narrative:       GFR Normal >60  Chronic Kidney Disease <60  Kidney Failure <15      CBC & Differential [[id number]] Collected:  08/20/20 1037    Specimen:  Blood from Arm, Right Updated:  08/20/20 1051    Narrative:       The following orders were created for panel order CBC & Differential.  Procedure                               Abnormality         Status                     ---------                               -----------         ------                     CBC Auto Differential[[id number]]        Abnormal            Final result                 Please view results for these tests on the individual orders.    CBC Auto Differential [[id number]]  (Abnormal) Collected:  08/20/20 1037    Specimen:  Blood from Arm, Right Updated:  08/20/20 1051     WBC 8.57 10*3/mm3      RBC 4.11 10*6/mm3      Hemoglobin 11.2 g/dL      Hematocrit 35.5 %      MCV 86.4 fL      MCH 27.3 pg      MCHC 31.5 g/dL      RDW 18.8 %      RDW-SD 58.5 fl      MPV 9.4 fL      Platelets 123 10*3/mm3      Neutrophil % 81.2 %      Lymphocyte % 9.5 %      Monocyte % 8.9 %      Eosinophil % 0.0 %      Basophil % 0.2 %      Immature Grans % 0.2 %      Neutrophils, Absolute 6.96 10*3/mm3      Lymphocytes, Absolute 0.81 10*3/mm3      Monocytes, Absolute 0.76 10*3/mm3      Eosinophils, Absolute 0.00 10*3/mm3      Basophils, Absolute 0.02 10*3/mm3      Immature Grans, Absolute 0.02 10*3/mm3      nRBC 0.0 /100 WBC             Radiology Data:    Imaging Results (Last 24 Hours)     Procedure Component Value Units Date/Time    CT Lumbar Spine Without Contrast [[id number]] Collected:  08/20/20 1119     Updated:  08/20/20 1128    Narrative:       EXAMINATION: CT LUMBAR SPINE WO CONTRAST- 8/20/2020 11:19 AM CDT     HISTORY: Low back pain after fall     COMPARISON: Nuclear medicine PET CT exam 9/24/2018, CT abdomen and  pelvis with contrast 6/24/2017     DOSE: 433 mGy-cm     TECHNIQUE: Sequential imaging was performed through the lumbar spine  without the use of IV contrast.  Sagittal and coronal reformations were  made from the original source data and reviewed. Automated exposure  control was also utilized to decrease patient radiation dose.     FINDINGS:   Alignment of the lumbar spine appears normal. An old L1 compression  deformity is evident. When compared with the exam from 2017, there is a  new superior endplate deformity of L3 which results in 40% loss of  vertebral body height. This appears to involve the anterior and middle  vertebral columns with extension into the posterior margin of the  vertebral body. There is no evidence of perched facet. The spinous  processes appear intact. The patient appears osteopenic. There is  curvature at the thoracolumbar junction, which may be positional.  Multilevel facet hypertrophy is present. Degenerative changes are noted  in the SI joints.     Multilevel degenerative disc disease is evident. There is bilateral  neuroforaminal narrowing at L4-L5. There is right greater than left  neural foraminal narrowing at L5-S1. There is diffuse disc bulge at  L5-S1. No critical spinal canal stenosis is identified.     Review of the visualized paraspinal soft tissues demonstrates a low  density lesion extending from the right kidney which is incompletely  characterized on this exam but present on the previous PET CT exam and  likely a cyst. Atherosclerotic calcifications are seen in the aorta  and  its branch vessels.        Impression:       1. When compared with the most recent exam, there is a new superior  endplate deformity of the L3 vertebral body involving the anterior and  middle columns. This is considered a possibly unstable fracture.     2. Stable compression deformity at L1.     Findings were called to Dr. Mancuso in the emergency department at 11:24  AM on 8/20/2020.     This report was finalized on 08/20/2020 11:25 by Dr. Neal Leon MD.    CT Thoracic Spine Without Contrast [[id number]] Collected:  08/20/20 1114     Updated:  08/20/20 1124    Narrative:       EXAM: CT THORACIC SPINE WO CONTRAST- - 8/20/2020 10:46 AM CDT     HISTORY: T/L-spine trauma, minor-mod, low back pain       COMPARISON: 6/24/2017.      DOSE LENGTH PRODUCT: 481 mGy cm. Automated exposure control was also  utilized to decrease patient radiation dose.     TECHNIQUE: Unenhanced CT of the thoracic spine with multiplanar  reformats.     FINDINGS:  T1 outside the field-of-view. T2 to mid L2 visualized.      Similar chronic height loss of L1.     Minimal central age-indeterminate height loss of T4. Remaining vertebral  body heights within normal limits. Mild diffuse disc height loss.  Posterior elements appear intact.     Subacute appearing fractures of posterior right 10th and 11th ribs on  axial series 4, image 63 and 72.     Limited evaluation of the spinal cord due to CT technique.     Calcified aortic atherosclerosis. Descending thoracic aorta measures 3.3  cm at the diaphragm, enlarged.     Multiple pulmonary nodules. Comparison to prior PET/CT 9/24/2018 is  limited due to technique, however nodules appear increased in size.  Specifically, left lower lobe with a 1.9 cm pulmonary nodule on axial  series 4, image 56, previously 1.3 cm on 9/24/2018.          Impression:       1. Minimal age indeterminate central height loss of T4. Correlate with  patient's symptoms.  2. Subacute appearing fractures of the posterior right  10th and 11th  ribs.  3. Multiple pulmonary nodules, which appear increased compared to PET/CT  9/24/2018, concerning for progression of metastatic disease. Consider  comparison with interval outside imaging.  4. Descending thoracic aorta enlarged measuring 3.3 cm at the level of  the diaphragm.  This report was finalized on 08/20/2020 11:21 by Dr Keely Mandel MD.    CT Cervical Spine Without Contrast [[id number]] Collected:  08/20/20 1114     Updated:  08/20/20 1121    Narrative:       EXAMINATION: CT CERVICAL SPINE WO CONTRAST- 8/20/2020 11:14 AM CDT     HISTORY: Neck pain after fall     COMPARISON: None     DOSE: 277 mGy-cm     TECHNIQUE: Sequential imaging was performed through the cervical spine  without the use of IV contrast.  Sagittal and coronal reformations were  made from the original source data and reviewed. Automated exposure  control was also utilized to decrease patient radiation dose.     FINDINGS:   The patient is diffusely osteopenic. Alignment of the cervical spine  appears normal. Vertebral body heights appear well maintained. There is  no evidence of perched facet. The spinous processes appear intact.  Alignment of the odontoid with lateral masses of C1 appears grossly  normal. The occipital condyles appear intact. 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 There is no evidence of acute  fracture. Multilevel facet hypertrophy and degenerative neuroforaminal  narrowing is identified. No critical spinal canal stenosis is  identified. No prevertebral edema is appreciated.     The visualized lung apices appear clear. No acute soft tissue  abnormalities are identified.           Impression:       1. No evidence of acute osseous injury in the cervical spine.     This report was finalized on 08/20/2020 11:18 by Dr. Neal Leon MD.    CT Head Without Contrast [[id number]] Collected:  08/20/20 1112     Updated:  08/20/20 1117    Narrative:       EXAM: CT HEAD WO CONTRAST- - 8/20/2020  10:46 AM CDT     HISTORY: Head trauma, headache       COMPARISON: No existing relevant imaging studies available.      DOSE LENGTH PRODUCT: 684 mGy cm. Automated exposure control was also  utilized to decrease patient radiation dose.     TECHNIQUE: Unenhanced CT images obtained from vertex to skull base with  multiplanar reformats.     FINDINGS:  No acute intracranial hemorrhage, midline shift, mass effect or large  territory cortical infarct. Ventricles, sulci and basilar cisterns  proportionally prominent suggesting volume loss. Patchy periventricular  and subcortical white matter hypodensity, most commonly due to chronic  microvascular changes. Vascular calcifications present.     Globes, retrobulbar soft tissues, paranasal sinuses, mastoid air cells  and external auditory canals are within normal limits. Calvarium appears  intact. Subcutaneous tissues within normal limits.     Patchy mineralization of the calvarium.          Impression:       1. No acute intracranial findings.   2. Moderate chronic microvascular changes and volume loss.  3. Patchy mineralization of the calvarium, could be seen with  osteopenia, but infiltrative process such as multiple myeloma not  excluded. Recommend correlation with patient history.  This report was finalized on 08/20/2020 11:14 by Dr Keely Mandel MD.          I have personally reviewed and interpreted the radiology studies and ECG obtained at time of admission.     Assessment / Plan      Assessment & Plan  Active Hospital Problems    Diagnosis   • Carcinoma of colon metastatic to lung (CMS/HCC)   • L3 vertebral fracture (CMS/HCC)   • Rib fractures   • Closed compression fracture of body of L1 vertebra (CMS/HCC)   • Protein calorie malnutrition (CMS/HCC)   • Weight loss   • Other dysphagia   • Gastroesophageal reflux disease without esophagitis   • Liver cancer (CMS/HCC)   • Colon cancer (CMS/HCC)   • Schatzki's ring     Dilated up to 12 mm December 2017     • Hiatal hernia      Plans    Colon cancer with progressive mets to the lungs.  Plan for hospice.    L3 fracture/falling/L1 compression fracture/height loss T4, subacute posterior rib fracture right 10th and 11th ribs/.  Morphine PRN.  Percocet PRN.  Cervical spine-No evidence of acute osseous injury in the cervical spine.  CT scan of the head-No acute intracranial findings, Moderate chronic microvascular changes and volume loss,  Patchy mineralization of the calvarium- could be seen with  Osteopenia- but infiltrative process such as multiple myeloma not excluded.   CT scan L-spine - new superior endplate deformity of the L3 vertebral body involving the anterior and middle columns- this is considered a possibly unstable fracture, stable compression fracture of L1.  CT scan of T-spine-Minimal age indeterminate central height loss of T4, subacute appearing fractures of the posterior right 10th and 11th ribs, multiple pulmonary nodules, which appear increased compared to PET/CT 9/24/2018, concerning for progression of metastatic disease, descending thoracic aorta enlargement measuring 3.3 cm at the level of diaphragm.  .  Dehydration.  IV fluids.    Nausea/vomiting/reflux/dysphasia history of Schatzki's ring/history of multiple esophageal dilatation.  Speech evaluate for feeding.  Pepcid IV.  Zofran PRN.    Anemia.  Will follow    Code Status: DNR.  DNI.  [facility]     I discussed the patient's findings and my recommendations with: Patient and son.    Estimated length of stay: 1 to 3 days.    Electronically signed by Jaspreet Calderon MD, 08/20/20, 5:00 PM.

## 2020-08-20 NOTE — CONSULTS
NEUROSURGERY INITIAL HOSPITAL ENCOUNTER    Assessment/Plan:   Jonas Snow is a 80 y.o. 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 Mac with the [facility], 5-year history of chemotherapy recently discontinued over the past 3 weeks; type 2 diabetes, and vitamin D deficiency.  He presents today for further evaluation post fall.  Physical exam findings of ill-appearing and emaciated, diffuse bruises and skin tears to the upper and lower extremities, GCS 15, oriented x3, names objects, decreased sensation to the bilateral lower extremities from the knees, global weakness, and global hyporeflexia.  Their imaging: CT the head shows no acute fractures or intracranial abnormalities.  CT of the cervical spine shows no acute fractures, multilevel degenerative changes.  MRI of the thoracic spine shows a questionable type A1 compression deformity at T4.  MRI of the lumbar spine shows chronic appearing wedge-shaped compression deformity to L1 and acute appearing type A1 wedge-shaped compression fracture at L3, no significant malalignment, multilevel degenerative changes.    Differential Diagnosis:   Adenocarcinoma of the sigmoid colon with mets to the liver, lung, and prostate  Physical deconditioning  Global weakness  Fall  Acute appearing wedge-shaped compression deformities to T4 and L3    Recommendations:  Dr. Wilks reviewed all imaging and recommends the following …    Considering Mr. Harris current physical state, no surgical intervention is warranted nor most likely be tolerated at this time.  Mr. Snow should be placed in an LSO brace for comfort and stability.  The LSO brace should be worn at all times while out of bed.  Max analgesics as needed for pain.  Avoid NSAIDs.  We would further recommend palliative care for aggressive end-stage, terminal cancer management.  Neurosurgery will  continue to follow as needed.  Mr. Snow may follow-up with the neurosurgical clinic in 2-4 weeks for reassessment.     After brief discussion with family, Mr. Snow and son, Jonas Snow Jr have elected to decline any and all surgical intervention and proceed with comfort care measures only.    I discussed the patients findings and my recommendations with patient, family and consulting provider     Consult at the request of emergency department physician, Dr. Keely Cabrera    Thank you very much for this interesting consult.     Level of Risk: Moderate due to: undiagnosed new problem  MDM: Moderate Complexity  Mod = 86639, High=99223  ________________________________________________________________    Reason for consult: Fall    Chief Complaint:   Chief Complaint   Patient presents with   • Fall     HPI: Jonas Snow is a 80 y.o. 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 Mac with the [facility], 5-year history of chemotherapy recently discontinued over the past 3 weeks; type 2 diabetes, and vitamin D deficiency.      Despite progressive cancer and long-term chemotherapy, family states Jonas has maintained relatively good health and been fairly physically independent.  Approximately 3 weeks ago, Mr. Harris chemotherapy was discontinued by his oncologist.   Since this time, family reports a significant rapid decline in his overall health and are currently pursuing hospice care.     Mr. Snow presents today [facility] ED with his son, Jonas Snow Jr. for further evaluation post fall.  Family states Mr. Snow has fallen from bed twice over the past 2 days.  His most recent fall occurred this morning at approximately 2 AM and he was found lying on the floor by his son at approximately 7 AM.  Mr. Snow states he hit his head during the fall, however he is unsure if he lost  consciousness.  He was unable to stand unassisted.  Upon family's arrival, EMS was notified, and Jonas was transported to [facility] ED for further evaluation and care.    Currently, Mr. Snow denies headaches, or neck, thoracic, or lumbar back pain.  He denies upper or lower extremity radicular pain.  He does report global weakness, as well as numbness and tingling to the bilateral lower extremities in a stocking and glove distribution from the knees.  He additionally denies fevers, chills, night sweats, saddle anesthesia, or bowel or bladder dysfunction.  He currently rates the severity of his symptoms 4/10.     Review of Systems   Constitutional: Positive for activity change, appetite change and unexpected weight change. Negative for chills and fever.   HENT: Negative.    Eyes: Negative.    Respiratory: Negative.    Cardiovascular: Negative.    Gastrointestinal: Negative.    Endocrine: Negative.    Genitourinary: Negative.    Musculoskeletal: Positive for gait problem. Negative for back pain, neck pain and neck stiffness.   Skin: Negative.    Allergic/Immunologic: Negative.    Neurological: Positive for weakness and numbness.   Hematological: Negative.    Psychiatric/Behavioral: Negative.    All other systems reviewed and are negative.     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    Past Surgical History:  has a past surgical history that includes Tonsillectomy; Esophagogastroduodenoscopy (12/19/2014); Venous Access Device (Port); Colonoscopy (N/A, 1/6/2017); Esophagogastroduodenoscopy (N/A,  10/5/2017); Esophagogastroduodenoscopy (N/A, 11/2/2017); Esophagogastroduodenoscopy (N/A, 12/11/2017); Esophagogastroduodenoscopy (N/A, 1/19/2018); Esophagogastroduodenoscopy (N/A, 8/31/2018); Esophagogastroduodenoscopy (N/A, 9/28/2018); Esophagogastroduodenoscopy (N/A, 10/31/2018); Esophagogastroduodenoscopy (11/18/2014); Esophagogastroduodenoscopy (N/A, 10/17/2019); Esophagogastroduodenoscopy (N/A, 11/7/2019); Esophagogastroduodenoscopy (N/A, 12/2/2019); and Esophagogastroduodenoscopy (N/A, 12/23/2019).    Family History: family history is not on file.    Social History:  reports that he quit smoking about 11 years ago. His smoking use included cigarettes. He has never used smokeless tobacco. He reports that he drinks alcohol. He reports that he does not use drugs.    Allergies: Patient has no known allergies.    Home Medications:   Current Facility-Administered Medications:   •  morphine injection 4 mg, 4 mg, Intravenous, Once, Keely Mancuso MD    Current Outpatient Medications:   •  finasteride (PROSCAR) 5 MG tablet, Take 5 mg by mouth Daily., Disp: , Rfl:   •  pantoprazole (PROTONIX) 40 MG EC tablet, Take 1 tablet by mouth 2 (Two) Times a Day Before Meals., Disp: 60 tablet, Rfl: 11  •  sucralfate (CARAFATE) 1 g tablet, Take 1 tablet by mouth 4 (Four) Times a Day., Disp: 120 tablet, Rfl: 0  •  sucralfate (CARAFATE) 1 GM/10ML suspension, Take 10 mL by mouth 4 (Four) Times a Day., Disp: 3600 mL, Rfl: 2  •  tamsulosin (FLOMAX) 0.4 MG capsule 24 hr capsule, Take 1 capsule by mouth Every Night., Disp: , Rfl:     Medications: Scheduled Meds:  Morphine 4 mg Intravenous Once     Continuous Infusions:   PRN Meds:.    Vital Signs  Temp:  [97.7 °F (36.5 °C)] 97.7 °F (36.5 °C)  Heart Rate:  [] 87  Resp:  [26] 26  BP: (113-148)/(55-94) 134/64    Physical Exam  Physical Exam   Constitutional: He is oriented to person, place, and time. Vital signs are normal. He appears well-developed. He is cooperative.  Non-toxic  appearance. He has a sickly appearance. He appears ill. No distress.   BMI 19.94   HENT:   Head: Normocephalic and atraumatic.   Right Ear: Hearing normal.   Left Ear: Hearing normal.   Mouth/Throat: Mucous membranes are dry.   Eyes: Pupils are equal, round, and reactive to light. Conjunctivae and EOM are normal.   Neck: Trachea normal and full passive range of motion without pain. Neck supple.   Cardiovascular: Normal rate and regular rhythm.   Pulmonary/Chest: Effort normal. No accessory muscle usage. No apnea, no tachypnea and no bradypnea. No respiratory distress.   Abdominal: Soft. Normal appearance.   Neurological: He is alert and oriented to person, place, and time. GCS eye subscore is 4. GCS verbal subscore is 5. GCS motor subscore is 6.   Reflex Scores:       Tricep reflexes are 1+ on the right side and 1+ on the left side.       Bicep reflexes are 1+ on the right side and 1+ on the left side.       Brachioradialis reflexes are 1+ on the right side and 1+ on the left side.       Patellar reflexes are 1+ on the right side and 1+ on the left side.       Achilles reflexes are 1+ on the right side and 1+ on the left side.  Skin: Skin is warm and dry. He is not diaphoretic.   Diffuse bruises and skin tears to the upper and lower extremities   Psychiatric: He has a normal mood and affect. His speech is normal and behavior is normal.   Nursing note and vitals reviewed.      Neurologic Exam     Mental Status   Oriented to person, place, and time.   Attention: normal. Concentration: normal.   Speech: speech is normal   Level of consciousness: alert  Able to name object.     Awake, oriented x3, his oral mucous membranes are visibly dry garbling of speech, names objects, follows commands without prompting showing thumbs up and 2 fingers bilaterally     Cranial Nerves     CN II   Visual fields full to confrontation.     CN III, IV, VI   Pupils are equal, round, and reactive to light.  Extraocular motions are normal.      CN V   Facial sensation intact.     CN VII   Facial expression full, symmetric.     CN VIII   CN VIII normal.     CN IX, X   CN IX normal.     CN XI   CN XI normal.     Motor Exam   Right arm tone: normal  Left arm tone: normal  Right arm pronator drift: absent  Left arm pronator drift: absent  Right leg tone: normal  Left leg tone: normal    Strength   Right deltoid: 4/5  Left deltoid: 4/5  Right biceps: 4/5  Left biceps: 4/5  Right triceps: 4/5  Left triceps: 4/5  Right wrist flexion: 4/5  Left wrist flexion: 4/5  Right wrist extension: 4/5  Left wrist extension: 4/5  Right iliopsoas: 4/5  Left iliopsoas: 4/5  Right quadriceps: 4/5  Left quadriceps: 4/5  Right anterior tibial: 4/5  Left anterior tibial: 4/5  Right posterior tibial: 4/5  Left posterior tibial: 4/5  Moves all extremities however globally weak     Sensory Exam   Right arm light touch: normal  Left arm light touch: normal  Right leg light touch: decreased from knee  Left leg light touch: decreased from knee    Gait, Coordination, and Reflexes     Tremor   Resting tremor: absent  Intention tremor: absent  Action tremor: absent    Reflexes   Right brachioradialis: 1+  Left brachioradialis: 1+  Right biceps: 1+  Left biceps: 1+  Right triceps: 1+  Left triceps: 1+  Right patellar: 1+  Left patellar: 1+  Right achilles: 1+  Left achilles: 1+  Right : 3+  Left : 3+  Right plantar: equivocal  Left plantar: equivocal  Right Neves: absent  Left Neves: absent  Right ankle clonus: absent  Left ankle clonus: absent  Right pendular knee jerk: absent  Left pendular knee jerk: absent    Results Review:   Independent review and interpretation of imaging  Imaging Results (Last 24 Hours)     Procedure Component Value Units Date/Time    CT Lumbar Spine Without Contrast [[id number]] Collected:  08/20/20 1119     Updated:  08/20/20 1128    Narrative:       EXAMINATION: CT LUMBAR SPINE WO CONTRAST- 8/20/2020 11:19 AM CDT     HISTORY: Low back pain after  fall     COMPARISON: Nuclear medicine PET CT exam 9/24/2018, CT abdomen and  pelvis with contrast 6/24/2017     DOSE: 433 mGy-cm     TECHNIQUE: Sequential imaging was performed through the lumbar spine  without the use of IV contrast.  Sagittal and coronal reformations were  made from the original source data and reviewed. Automated exposure  control was also utilized to decrease patient radiation dose.     FINDINGS:   Alignment of the lumbar spine appears normal. An old L1 compression  deformity is evident. When compared with the exam from 2017, there is a  new superior endplate deformity of L3 which results in 40% loss of  vertebral body height. This appears to involve the anterior and middle  vertebral columns with extension into the posterior margin of the  vertebral body. There is no evidence of perched facet. The spinous  processes appear intact. The patient appears osteopenic. There is  curvature at the thoracolumbar junction, which may be positional.  Multilevel facet hypertrophy is present. Degenerative changes are noted  in the SI joints.     Multilevel degenerative disc disease is evident. There is bilateral  neuroforaminal narrowing at L4-L5. There is right greater than left  neural foraminal narrowing at L5-S1. There is diffuse disc bulge at  L5-S1. No critical spinal canal stenosis is identified.     Review of the visualized paraspinal soft tissues demonstrates a low  density lesion extending from the right kidney which is incompletely  characterized on this exam but present on the previous PET CT exam and  likely a cyst. Atherosclerotic calcifications are seen in the aorta and  its branch vessels.        Impression:       1. When compared with the most recent exam, there is a new superior  endplate deformity of the L3 vertebral body involving the anterior and  middle columns. This is considered a possibly unstable fracture.     2. Stable compression deformity at L1.     Findings were called to   [place] in the emergency department at 11:24  AM on 8/20/2020.     This report was finalized on 08/20/2020 11:25 by Dr. Neal Leon MD.    CT Thoracic Spine Without Contrast [[id number]] Collected:  08/20/20 1114     Updated:  08/20/20 1124    Narrative:       EXAM: CT THORACIC SPINE WO CONTRAST- - 8/20/2020 10:46 AM CDT     HISTORY: T/L-spine trauma, minor-mod, low back pain       COMPARISON: 6/24/2017.      DOSE LENGTH PRODUCT: 481 mGy cm. Automated exposure control was also  utilized to decrease patient radiation dose.     TECHNIQUE: Unenhanced CT of the thoracic spine with multiplanar  reformats.     FINDINGS:  T1 outside the field-of-view. T2 to mid L2 visualized.      Similar chronic height loss of L1.     Minimal central age-indeterminate height loss of T4. Remaining vertebral  body heights within normal limits. Mild diffuse disc height loss.  Posterior elements appear intact.     Subacute appearing fractures of posterior right 10th and 11th ribs on  axial series 4, image 63 and 72.     Limited evaluation of the spinal cord due to CT technique.     Calcified aortic atherosclerosis. Descending thoracic aorta measures 3.3  cm at the diaphragm, enlarged.     Multiple pulmonary nodules. Comparison to prior PET/CT 9/24/2018 is  limited due to technique, however nodules appear increased in size.  Specifically, left lower lobe with a 1.9 cm pulmonary nodule on axial  series 4, image 56, previously 1.3 cm on 9/24/2018.          Impression:       1. Minimal age indeterminate central height loss of T4. Correlate with  patient's symptoms.  2. Subacute appearing fractures of the posterior right 10th and 11th  ribs.  3. Multiple pulmonary nodules, which appear increased compared to PET/CT  9/24/2018, concerning for progression of metastatic disease. Consider  comparison with interval outside imaging.  4. Descending thoracic aorta enlarged measuring 3.3 cm at the level of  the diaphragm.  This report was finalized on  08/20/2020 11:21 by Dr Keely Mandel MD.    CT Cervical Spine Without Contrast [[id number]] Collected:  08/20/20 1114     Updated:  08/20/20 1121    Narrative:       EXAMINATION: CT CERVICAL SPINE WO CONTRAST- 8/20/2020 11:14 AM CDT     HISTORY: Neck pain after fall     COMPARISON: None     DOSE: 277 mGy-cm     TECHNIQUE: Sequential imaging was performed through the cervical spine  without the use of IV contrast.  Sagittal and coronal reformations were  made from the original source data and reviewed. Automated exposure  control was also utilized to decrease patient radiation dose.     FINDINGS:   The patient is diffusely osteopenic. Alignment of the cervical spine  appears normal. Vertebral body heights appear well maintained. There is  no evidence of perched facet. The spinous processes appear intact.  Alignment of the odontoid with lateral masses of C1 appears grossly  normal. The occipital condyles appear intact. 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 There is no evidence of acute  fracture. Multilevel facet hypertrophy and degenerative neuroforaminal  narrowing is identified. No critical spinal canal stenosis is  identified. No prevertebral edema is appreciated.     The visualized lung apices appear clear. No acute soft tissue  abnormalities are identified.           Impression:       1. No evidence of acute osseous injury in the cervical spine.     This report was finalized on 08/20/2020 11:18 by Dr. Neal Leon MD.    CT Head Without Contrast [[id number]] Collected:  08/20/20 1112     Updated:  08/20/20 1117    Narrative:       EXAM: CT HEAD WO CONTRAST- - 8/20/2020 10:46 AM CDT     HISTORY: Head trauma, headache       COMPARISON: No existing relevant imaging studies available.      DOSE LENGTH PRODUCT: 684 mGy cm. Automated exposure control was also  utilized to decrease patient radiation dose.     TECHNIQUE: Unenhanced CT images obtained from vertex to skull base with  multiplanar  reformats.     FINDINGS:  No acute intracranial hemorrhage, midline shift, mass effect or large  territory cortical infarct. Ventricles, sulci and basilar cisterns  proportionally prominent suggesting volume loss. Patchy periventricular  and subcortical white matter hypodensity, most commonly due to chronic  microvascular changes. Vascular calcifications present.     Globes, retrobulbar soft tissues, paranasal sinuses, mastoid air cells  and external auditory canals are within normal limits. Calvarium appears  intact. Subcutaneous tissues within normal limits.     Patchy mineralization of the calvarium.          Impression:       1. No acute intracranial findings.   2. Moderate chronic microvascular changes and volume loss.  3. Patchy mineralization of the calvarium, could be seen with  osteopenia, but infiltrative process such as multiple myeloma not  excluded. Recommend correlation with patient history.  This report was finalized on 08/20/2020 11:14 by Dr Keely Mandel MD.        MRI brain:  MRI spine:   CT Head:    CT c-spine:    CT t-spine:    CT l-spine:    X-ray:    I reviewed the patient's new clinical results.  Lab Results (last 24 hours)     Procedure Component Value Units Date/Time    Comprehensive Metabolic Panel [[id number]]  (Abnormal) Collected:  08/20/20 1037    Specimen:  Blood from Arm, Right Updated:  08/20/20 1108     Glucose 71 mg/dL      BUN 27 mg/dL      Creatinine 0.90 mg/dL      Sodium 141 mmol/L      Potassium 3.9 mmol/L      Chloride 98 mmol/L      CO2 22.0 mmol/L      Calcium 8.9 mg/dL      Total Protein 5.9 g/dL      Albumin 2.50 g/dL      ALT (SGPT) 21 U/L      AST (SGOT) 66 U/L      Alkaline Phosphatase 285 U/L      Total Bilirubin 1.3 mg/dL      eGFR Non African Amer 82 mL/min/1.73      Globulin 3.4 gm/dL      A/G Ratio 0.7 g/dL      BUN/Creatinine Ratio 30.0     Anion Gap 21.0 mmol/L     Narrative:       GFR Normal >60  Chronic Kidney Disease <60  Kidney Failure <15      CBC &  Differential [[id number]] Collected:  08/20/20 1037    Specimen:  Blood from Arm, Right Updated:  08/20/20 1051    Narrative:       The following orders were created for panel order CBC & Differential.  Procedure                               Abnormality         Status                     ---------                               -----------         ------                     CBC Auto Differential[[id number]]        Abnormal            Final result                 Please view results for these tests on the individual orders.    CBC Auto Differential [[id number]]  (Abnormal) Collected:  08/20/20 1037    Specimen:  Blood from Arm, Right Updated:  08/20/20 1051     WBC 8.57 10*3/mm3      RBC 4.11 10*6/mm3      Hemoglobin 11.2 g/dL      Hematocrit 35.5 %      MCV 86.4 fL      MCH 27.3 pg      MCHC 31.5 g/dL      RDW 18.8 %      RDW-SD 58.5 fl      MPV 9.4 fL      Platelets 123 10*3/mm3      Neutrophil % 81.2 %      Lymphocyte % 9.5 %      Monocyte % 8.9 %      Eosinophil % 0.0 %      Basophil % 0.2 %      Immature Grans % 0.2 %      Neutrophils, Absolute 6.96 10*3/mm3      Lymphocytes, Absolute 0.81 10*3/mm3      Monocytes, Absolute 0.76 10*3/mm3      Eosinophils, Absolute 0.00 10*3/mm3      Basophils, Absolute 0.02 10*3/mm3      Immature Grans, Absolute 0.02 10*3/mm3      nRBC 0.0 /100 WBC         Byron Hall, APRN

## 2020-08-21 VITALS
TEMPERATURE: 97.4 F | BODY MASS INDEX: 19.91 KG/M2 | DIASTOLIC BLOOD PRESSURE: 62 MMHG | WEIGHT: 147 LBS | SYSTOLIC BLOOD PRESSURE: 116 MMHG | OXYGEN SATURATION: 100 % | RESPIRATION RATE: 18 BRPM | HEIGHT: 72 IN | HEART RATE: 53 BPM

## 2020-08-21 LAB
ALBUMIN SERPL-MCNC: 2.2 G/DL (ref 3.5–5.2)
ALBUMIN/GLOB SERPL: 0.7 G/DL
ALP SERPL-CCNC: 234 U/L (ref 39–117)
ALT SERPL W P-5'-P-CCNC: 18 U/L (ref 1–41)
ANION GAP SERPL CALCULATED.3IONS-SCNC: 15 MMOL/L (ref 5–15)
AST SERPL-CCNC: 55 U/L (ref 1–40)
BASOPHILS # BLD AUTO: 0.01 10*3/MM3 (ref 0–0.2)
BASOPHILS NFR BLD AUTO: 0.1 % (ref 0–1.5)
BILIRUB SERPL-MCNC: 1 MG/DL (ref 0–1.2)
BUN SERPL-MCNC: 26 MG/DL (ref 8–23)
BUN/CREAT SERPL: 37.7 (ref 7–25)
CALCIUM SPEC-SCNC: 8.6 MG/DL (ref 8.6–10.5)
CHLORIDE SERPL-SCNC: 101 MMOL/L (ref 98–107)
CO2 SERPL-SCNC: 27 MMOL/L (ref 22–29)
CREAT SERPL-MCNC: 0.69 MG/DL (ref 0.76–1.27)
DEPRECATED RDW RBC AUTO: 60.7 FL (ref 37–54)
EOSINOPHIL # BLD AUTO: 0.02 10*3/MM3 (ref 0–0.4)
EOSINOPHIL NFR BLD AUTO: 0.3 % (ref 0.3–6.2)
ERYTHROCYTE [DISTWIDTH] IN BLOOD BY AUTOMATED COUNT: 19 % (ref 12.3–15.4)
GFR SERPL CREATININE-BSD FRML MDRD: 111 ML/MIN/1.73
GLOBULIN UR ELPH-MCNC: 3.1 GM/DL
GLUCOSE SERPL-MCNC: 63 MG/DL (ref 65–99)
HCT VFR BLD AUTO: 34.1 % (ref 37.5–51)
HGB BLD-MCNC: 10.6 G/DL (ref 13–17.7)
IMM GRANULOCYTES # BLD AUTO: 0.03 10*3/MM3 (ref 0–0.05)
IMM GRANULOCYTES NFR BLD AUTO: 0.4 % (ref 0–0.5)
LYMPHOCYTES # BLD AUTO: 0.93 10*3/MM3 (ref 0.7–3.1)
LYMPHOCYTES NFR BLD AUTO: 12.7 % (ref 19.6–45.3)
MCH RBC QN AUTO: 27.2 PG (ref 26.6–33)
MCHC RBC AUTO-ENTMCNC: 31.1 G/DL (ref 31.5–35.7)
MCV RBC AUTO: 87.7 FL (ref 79–97)
MONOCYTES # BLD AUTO: 0.69 10*3/MM3 (ref 0.1–0.9)
MONOCYTES NFR BLD AUTO: 9.4 % (ref 5–12)
NEUTROPHILS NFR BLD AUTO: 5.63 10*3/MM3 (ref 1.7–7)
NEUTROPHILS NFR BLD AUTO: 77.1 % (ref 42.7–76)
NRBC BLD AUTO-RTO: 0 /100 WBC (ref 0–0.2)
PLATELET # BLD AUTO: 107 10*3/MM3 (ref 140–450)
PMV BLD AUTO: 10.2 FL (ref 6–12)
POTASSIUM SERPL-SCNC: 3.8 MMOL/L (ref 3.5–5.2)
PROT SERPL-MCNC: 5.3 G/DL (ref 6–8.5)
RBC # BLD AUTO: 3.89 10*6/MM3 (ref 4.14–5.8)
SODIUM SERPL-SCNC: 143 MMOL/L (ref 136–145)
TSH SERPL DL<=0.05 MIU/L-ACNC: 2.22 UIU/ML (ref 0.27–4.2)
WBC # BLD AUTO: 7.31 10*3/MM3 (ref 3.4–10.8)

## 2020-08-21 PROCEDURE — 84443 ASSAY THYROID STIM HORMONE: CPT | Performed by: FAMILY MEDICINE

## 2020-08-21 PROCEDURE — 99231 SBSQ HOSP IP/OBS SF/LOW 25: CPT | Performed by: NURSE PRACTITIONER

## 2020-08-21 PROCEDURE — 92610 EVALUATE SWALLOWING FUNCTION: CPT

## 2020-08-21 PROCEDURE — 25010000002 MORPHINE SULFATE (PF) 2 MG/ML SOLUTION: Performed by: FAMILY MEDICINE

## 2020-08-21 PROCEDURE — 85025 COMPLETE CBC W/AUTO DIFF WBC: CPT | Performed by: FAMILY MEDICINE

## 2020-08-21 PROCEDURE — 80053 COMPREHEN METABOLIC PANEL: CPT | Performed by: FAMILY MEDICINE

## 2020-08-21 RX ORDER — FAMOTIDINE 20 MG/1
20 TABLET, FILM COATED ORAL 2 TIMES DAILY
Start: 2020-08-21

## 2020-08-21 RX ORDER — MORPHINE SULFATE 100 MG/5ML
5 SOLUTION ORAL
Qty: 30 ML | Refills: 0 | Status: SHIPPED | OUTPATIENT
Start: 2020-08-21

## 2020-08-21 RX ADMIN — MORPHINE SULFATE 2 MG: 2 INJECTION, SOLUTION INTRAMUSCULAR; INTRAVENOUS at 15:42

## 2020-08-21 RX ADMIN — FAMOTIDINE 20 MG: 10 INJECTION INTRAVENOUS at 08:35

## 2020-08-21 RX ADMIN — SODIUM CHLORIDE, POTASSIUM CHLORIDE, SODIUM LACTATE AND CALCIUM CHLORIDE 100 ML/HR: 600; 310; 30; 20 INJECTION, SOLUTION INTRAVENOUS at 05:26

## 2020-08-21 NOTE — PROGRESS NOTES
Continued Stay Note  ELSA Cardoza     Patient Name: Jonas Snow  MRN: [id number]  Today's Date: 8/21/2020    Admit Date: 8/20/2020    Discharge Plan     Row Name 08/21/20 1048       Plan    Plan  Possibly [place] with Hospice    Plan Comments  Spoke with CHRISTI Lakhani from [facility] [phone], and she was working on approving [place]. Cord from [place] then called and said he was approved. They can take him today.         Discharge Codes    No documentation.             MARA Dye

## 2020-08-21 NOTE — PLAN OF CARE
Problem: Patient Care Overview  Goal: Plan of Care Review  Outcome: Ongoing (interventions implemented as appropriate)  Flowsheets (Taken 8/21/2020 1785)  Plan of Care Reviewed With: patient  Outcome Summary: Clinical bedside swallow evaluation completed.Pudding, honey, nectar, and thin consistencies were presented. Pt had delayed swallow initiation. No overt s/s of aspiration were observed. Pt has history of extensive esophageal dysphagia due to Schatzki's ring. He's had multiple esophageal dilations. His son reports that he was not eating any solid foods for the last 3 weeks, but that he was drinking liquids. Pt states he still has no interest in solid foods. He was agreeable to trying pureed foods. Recommend starting a puree diet and thin liquids. Meds crushed with applesauce or pudding. Ensure pt sitting upright for PO intake and for at least 30 minutes after PO. SLP will follow up to monitor diet tolerance and to make further diet changes as needed.

## 2020-08-21 NOTE — DISCHARGE SUMMARY
[facility] Medicine Services  DISCHARGE SUMMARY       Date of Admission: 8/20/2020  Date of Discharge:  8/21/2020  Primary Care Physician: Samia Vargas MD    Presenting Problem/History of Present Illness:  Carcinoma of colon metastatic to lung (CMS/HCC) [C18.9, C78.00]     Final Discharge Diagnoses:  Active Hospital Problems    Diagnosis   • Carcinoma of colon metastatic to lung (CMS/HCC)   • L3 vertebral fracture (CMS/HCC)   • Rib fractures   • Closed compression fracture of body of L1 vertebra (CMS/HCC)   • Protein calorie malnutrition (CMS/HCC)   • Weight loss   • Other dysphagia   • Gastroesophageal reflux disease without esophagitis   • Liver cancer (CMS/HCC)   • Colon cancer (CMS/HCC)   • Schatzki's ring     Dilated up to 12 mm December 2017     • Hiatal hernia       Pertinent Test Results:   IMPRESSION: CT cervical spine.  1. No evidence of acute osseous injury in the cervical spine.    IMPRESSION: CT scan head  1. No acute intracranial findings.   2. Moderate chronic microvascular changes and volume loss.  3. Patchy mineralization of the calvarium, could be seen with  osteopenia, but infiltrative process such as multiple myeloma not  excluded. Recommend correlation with patient history.    IMPRESSION: CT scan of the lumbar spine  1. When compared with the most recent exam, there is a new superior endplate deformity of the L3 vertebral body involving the anterior and middle columns. This is considered a possibly unstable fracture.  2. Stable compression deformity at L1.    IMPRESSION: CT scan thoracic spine  1. Minimal age indeterminate central height loss of T4. Correlate with patient's symptoms.  2. Subacute appearing fractures of the posterior right 10th and 11th ribs.  3. Multiple pulmonary nodules, which appear increased compared to PET/CT 9/24/2018, concerning for progression of metastatic disease. Consider comparison with interval outside imaging.  4. Descending  thoracic aorta enlarged measuring 3.3 cm at the level of the diaphragm.    Chief Complaint on Day of Discharge: none    History of Present Illness on Day of Discharge:   Patient is a 80-year-old  male presented ER for frequent fall.  Patient also complained of back pain.  History of colon cancer with mets to the liver.  Patient has been falling at home.  Patient is mostly bedbound at home.  CT scan shows L3 fracture possible unstable, compression fracture of L1, rib fracture right rib 10th-11 ribs.  Patient denies any loss of consciousness.  Patient did hit his head on the ground this morning.  Did complain of mild headaches and back pain from falling.  Patient is on chronic pain medication due to colon cancer, patient does not take his pain medication at home as directed.  Family wants patient in hospice.    Hospital Course:  The patient is a 80 y.o. male who presented to [facility] with colon cancer with mets to lung/plan for hospice/L2 fracture/falling/L1 compression fracture/rib fracture.      Colon cancer with progressive mets to the lungs.  Plan for hospice.     L3 fracture/falling/L1 compression fracture/height loss T4, subacute posterior rib fracture right 10th and 11th ribs/.  Morphine PRN.  Percocet PRN.  Cervical spine-No evidence of acute osseous injury in the cervical spine.  CT scan of the head-No acute intracranial findings, Moderate chronic microvascular changes and volume loss,  Patchy mineralization of the calvarium- could be seen with  Osteopenia- but infiltrative process such as multiple myeloma not excluded.   CT scan L-spine - new superior endplate deformity of the L3 vertebral body involving the anterior and middle columns- this is considered a possibly unstable fracture, stable compression fracture of L1.  CT scan of T-spine-Minimal age indeterminate central height loss of T4, subacute appearing fractures of the posterior right 10th and 11th ribs, multiple pulmonary  "nodules, which appear increased compared to PET/CT 9/24/2018, concerning for progression of metastatic disease, descending thoracic aorta enlargement measuring 3.3 cm at the level of diaphragm.  .  Dehydration.  IV fluids.     Nausea/vomiting/reflux/dysphasia history of Schatzki's ring/history of multiple esophageal dilatation.  Speech evaluate for feeding.  Pepcid IV.  Zofran PRN.     Anemia.  Will follow.  No sign of acute bleed.     Nutrition.  Purée/thin/consistent carb/nutrition supplement with Magic cup.     Code Status: DNR.  DNI.      Vital signs stable, afebrile. Patient be going to [facility] with hospice.  Follow-up with hospice at [facility].  Follow-up with nursing home physician as soon as able.    Condition on Discharge: stable    Physical Exam on Discharge:  /62 (BP Location: Right arm, Patient Position: Lying)   Pulse 53   Temp 97.4 °F (36.3 °C) (Oral)   Resp 18   Ht 182.9 cm (72\")   Wt 66.7 kg (147 lb)   SpO2 100%   BMI 19.94 kg/m²   Physical Exam  Constitutional: He appears well-developed.   Cachectic.   HENT:   Head: Normocephalic.   Eyes: Pupils are equal, round, and reactive to light. Conjunctivae are normal.   Neck: Neck supple. No JVD present.   Cardiovascular: Normal rate, regular rhythm, normal heart sounds and intact distal pulses. Exam reveals no gallop and no friction rub.   No murmur heard.  Pulmonary/Chest: No respiratory distress. He has no wheezes. He has no rales. He exhibits no tenderness.   Diminished breath sound bilateral, clear.   Abdominal: Soft. Bowel sounds are normal. He exhibits no distension. There is no tenderness. There is no rebound and no guarding.   Musculoskeletal: He exhibits no edema, tenderness or deformity.   Patient is curled up in fetal position.   Neurological: He displays normal reflexes. No cranial nerve deficit. He exhibits abnormal muscle tone. Coordination abnormal.   Skin: Skin is warm and dry. Capillary refill takes 2 to 3 seconds. No rash " noted.   Psychiatric: He has a normal mood and affect.   Nursing note and vitals reviewed.    Discharge Disposition:  Hospice/Medical Facility (DC - External)    Discharge Medications:     Discharge Medications      New Medications      Instructions Start Date   famotidine 20 MG tablet  Commonly known as:  Pepcid   20 mg, Oral, 2 Times Daily      morphine 20 MG/ML concentrated solution   5 mg, Oral, Every 3 Hours PRN         Continue These Medications      Instructions Start Date   tamsulosin 0.4 MG capsule 24 hr capsule  Commonly known as:  FLOMAX   1 capsule, Oral, Nightly         Stop These Medications    finasteride 5 MG tablet  Commonly known as:  PROSCAR     pantoprazole 40 MG EC tablet  Commonly known as:  PROTONIX     sucralfate 1 g tablet  Commonly known as:  Carafate     sucralfate 1 GM/10ML suspension  Commonly known as:  Carafate            Discharge Diet:   Diet Instructions     Advance Diet As Tolerated            Activity at Discharge:   Activity Instructions     Activity as Tolerated            Discharge Care Plan/Instructions: Discharged to [facility] via ambulance.  Hospice at [facility].    Follow-up Appointments:   Follow-up with hospice at [facility].  Follow-up nursing home physician soon as able.    Electronically signed by Jaspreet Calderon MD, 08/21/20, 13:08.    Time: Greater than 30 minutes.

## 2020-08-21 NOTE — PROGRESS NOTES
Discharge Planning Assessment  [facility]     Patient Name: Jonas Snow  MRN: [id number]  Today's Date: 8/21/2020    Admit Date: 8/20/2020    Discharge Needs Assessment     Row Name 08/21/20 0852       Living Environment    Lives With  child(asim), adult    Current Living Arrangements  home/apartment/condo    Primary Care Provided by  self    Provides Primary Care For  no one    Family Caregiver if Needed  child(asim), adult    Quality of Family Relationships  helpful;involved;supportive    Able to Return to Prior Arrangements  yes       Resource/Environmental Concerns    Resource/Environmental Concerns  none    Transportation Concerns  car, none       Transition Planning    Patient/Family Anticipates Transition to  inpatient rehabilitation facility;inpatient hospice    Transportation Anticipated  family or friend will provide       Discharge Needs Assessment    Readmission Within the Last 30 Days  no previous admission in last 30 days    Concerns to be Addressed  no discharge needs identified;denies needs/concerns at this time    Equipment Currently Used at Home  none    Anticipated Changes Related to Illness  none    Equipment Needed After Discharge  none    Patient's Choice of Community Agency(s)  [facility] vs Hospice house     Discharge Coordination/Progress  Pt has RX [facility] and [facility]. Pt was in the ER yesterday for pain and recent falls. Son states that they have been estranged for quite some time and reconnected the last couple of months. Susi has received the hospice information from [facility] and is in the middle of referral. Pt and family are requesting hospice [place] vs [place]. CHRISTI faxed referral to [facility] and spoke with Enio who is aware of referral and situation. CHRISTI will follow for plan with Susi from [facility] and Enio from [facility].         Discharge Plan    No documentation.       Destination      Coordination has not been started for this encounter.      Durable  Medical Equipment      Coordination has not been started for this encounter.      Dialysis/Infusion      Coordination has not been started for this encounter.      Home Medical Care      Coordination has not been started for this encounter.      Therapy      Coordination has not been started for this encounter.      Community Resources      Coordination has not been started for this encounter.          Demographic Summary    No documentation.       Functional Status    No documentation.       Psychosocial    No documentation.       Abuse/Neglect    No documentation.       Legal    No documentation.       Substance Abuse    No documentation.       Patient Forms    No documentation.           Cally Medina

## 2020-08-21 NOTE — PROGRESS NOTES
Continued Stay Note   [facility]     Patient Name: Jonas Snow  MRN: [id number]  Today's Date: 8/21/2020    Admit Date: 8/20/2020    Discharge Plan     Row Name 08/21/20 1420       Plan    Plan  [facility] with Hospice    Patient/Family in Agreement with Plan  yes    Final Discharge Disposition Code  04 - intermediate care facility    Final Note  Pt is going to [facility] [phone] today with [facility] [phone]. [facility] and [facility] to [facility] him [facility] around 1600 today.        Discharge Codes    No documentation.       Expected Discharge Date and Time     Expected Discharge Date Expected Discharge Time    Aug 21, 2020             AMRA Dye

## 2020-08-21 NOTE — PLAN OF CARE
Problem: Patient Care Overview  Goal: Plan of Care Review  Flowsheets (Taken 8/21/2020 0142)  Progress: no change  Plan of Care Reviewed With: patient  Note:   Pt incontinent of urine;  admitted for pain mainagement of Fx's and colon CA which is mets to liver lung and prostate;  bed alarm on ;  pt barely responding, sleeping mostly;  NPO excepts sips with meds;  was told in report he has had his esophagus stretched multiple times;  no pressure injury on skin but there is bruising and skin tears present;  Fall on 8-20-20;  Fx of L3 and T4, right side of ribs  10 and 11 fx;

## 2020-08-21 NOTE — THERAPY EVALUATION
Acute Care - Speech Language Pathology   Swallow Initial Evaluation [facility]     Patient Name: Jonas Snow  : 1940  MRN: [id number]  Today's Date: 2020               Admit Date: 2020  Clinical bedside swallow evaluation completed.Pudding, honey, nectar, and thin consistencies were presented. Pt had delayed swallow initiation. No overt s/s of aspiration were observed. Pt has history of extensive esophageal dysphagia due to Schatzki's ring. He's had multiple esophageal dilations. His son reports that he was not eating any solid foods for the last 3 weeks, but that he was drinking liquids. Pt states he still has no interest in solid foods. He was agreeable to trying pureed foods.     Recommend:  1. Starting a puree diet and thin liquids.   2. Meds crushed with applesauce or pudding.   3. Ensure pt sitting upright for PO intake and for at least 30 minutes after PO.   4. SLP will follow up to monitor diet tolerance and to make further diet changes as needed.  Mahnaz Goldstein, CCC-SLP 2020 09:30    Visit Dx:     ICD-10-CM ICD-9-CM   1. 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 Closed fracture of third lumbar vertebra, unspecified fracture morphology, initial encounter (CMS/HCC) S32.039A 805.4   3. Fall, initial encounter W19.XXXA E888.9   4. Closed fracture of fourth thoracic vertebra, unspecified fracture morphology, initial encounter (CMS/[facility]) S22.049A 805.2   5. Dysphagia, unspecified type R13.10 787.20     Patient Active Problem List   Diagnosis   • Hiatal hernia   • Schatzki's ring   • Colon cancer (CMS/HCC)   • Colon polyps   • Other dysphagia   • Gastroesophageal reflux disease without esophagitis   • Liver cancer (CMS/HCC)   • Esophageal stricture   • Weight loss   • Dysphagia   • Nausea and vomiting   • Chemotherapy induced nausea and vomiting   • Protein calorie malnutrition (CMS/HCC)   • Infestation by bed bug   • Dysphagia   • Anemia due  to chemotherapy   • GI bleed   • History of esophagitis   • History of esophageal stricture   • Non-intractable vomiting   • Carcinoma of colon metastatic to lung (CMS/HCC)   • L3 vertebral fracture (CMS/HCC)   • Rib fractures   • Closed compression fracture of body of L1 vertebra (CMS/HCC)     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)    • Secondary malignant neoplasm of unspecified lung (CMS/HCC)    • Type 2 diabetes mellitus (CMS/HCC)    • Vitamin D deficiency      Past Surgical History:   Procedure Laterality Date   • COLONOSCOPY N/A 1/6/2017    Diverticulosis; Malignant tumor in ascending colon-biopsied; One 20mm polyp in transverse colon-tattooed; Five 5-12mm polyps in transverse colon; One 7mm polyp at splenic flexure; Two 6-10mm polyps in descending colon; Two 5-8mm polyps in sigmoid colon; One 20mm polyp in the sigmoid colon-Clips placed; One 8mm polyp in rectum; Non-bleeding internal hemorrhoids; Repeat 1 year; See path report   • ENDOSCOPY  12/19/2014    HH; Non-obstructing Schatzki ring-dilated; Normal stomach; Normal examined duodenum   • ENDOSCOPY N/A 10/5/2017    LA Grade D esophagitis-biopsied; Esophageal stenosis-dilated; Small HH; Normal stomach; Normal examined duodenum; Repeat 1 month for retreatment   • ENDOSCOPY N/A 11/2/2017    Small HH; Esophageal stenosis-biopsied; Normal stomach; Normal examined duodenum   • ENDOSCOPY N/A 12/11/2017    Medium-sized HH; Low-grade of narrowing and non-obstructing Schatzki ring-dilated; Normal stomach; Normal examined duodenum; No specimens collected; Repeat 4-6 weeks for retreatment   • ENDOSCOPY N/A 1/19/2018    Medium-sized HH;  Non-obstructing Schatzki ring-dilated; Normal stomach; Normal examined duodenum; No specimens collected   • ENDOSCOPY N/A 8/31/2018    Benign-appearing esophageal stenosis-dilated; Small HH; Normal stomach; Normal examined duodenum; No specimens collected; Repeat 2-4 for retreatment   • ENDOSCOPY N/A 9/28/2018    Medium-sized HH; LA Grade D reflux esophagitis-dilated; Normal stomach; Normal examined duodenum; No specimens collected; Repeat 4-6 weeks   • ENDOSCOPY N/A 10/31/2018    Benign-appearing esophageal stenosis-dilated; Medium-sized HH; Normal stomach; Normal examined duodenum; No specimens collected   • ENDOSCOPY  11/18/2014    HH; Benign-appearing esophageal stricture-dilated; Normal stomach; Normal examined duodenum; Repeat 4 weeks for retreatment   • ENDOSCOPY N/A 10/17/2019    Benign-appearing esophageal stenosis-dilated; Small HH; Normal stomach; Normal examined duodenum; No specimens collected; Repeat 3 weeks   • ENDOSCOPY N/A 11/7/2019    Small HH; Benign-appearing esophageal stenosis-dilated; Normal stomach; Normal examined duodenum; No specimens collected; Repeat 3 weeks   • ENDOSCOPY N/A 12/2/2019    Benign-appearing esophageal stenosis-dilated; Medium-sized HH; Normal stomach; Normal examined duodenum; No specimens collected; Repeat 3 weeks   • ENDOSCOPY N/A 12/23/2019    Small HH; LA Grade D reflux esophagitis; Benign-appearing esophageal stenosis-dilated; Normal stomach; Normal examined duodenum; No specimens collected   • TONSILLECTOMY     • VENOUS ACCESS DEVICE (PORT) INSERTION          SWALLOW EVALUATION (last 72 hours)      [facility] Adult Swallow Evaluation     Row Name 08/21/20 0818                   Rehab Evaluation    Document Type  evaluation  -MB        Subjective Information  complains of;pain  -MB        Patient Observations  alert;cooperative  -MB        Patient/Family Observations  Son present  -MB           General Information    Patient Profile Reviewed  yes  -MB        Pertinent  History Of Current Problem  Fall, L1 compression fx, endplate deformity of L3, height loss at T4, fxs of right 10th and 11th ribs, colon cancer, pulmonary nodules consistent with metastatic disease, Schatzki's ring, multiple esophageal dilations, GERD, DM, tonsillectomy.  -MB        Current Method of Nutrition  NPO  -MB        Precautions/Limitations, Vision  WFL;for purposes of eval  -MB        Precautions/Limitations, Hearing  WFL;for purposes of eval  -MB        Prior Level of Function-Swallowing  thin liquids;other (see comments) primarily drinking liquids per son  -MB        Plans/Goals Discussed with  patient and family  -MB        Barriers to Rehab  medically complex  -MB        Patient's Goals for Discharge  patient did not state  -MB        Family Goals for Discharge  family did not state  -MB           Pain Assessment    Additional Documentation  Pain Scale: FACES Pre/Post-Treatment (Group)  -MB           Pain Scale: FACES Pre/Post-Treatment    Pain: FACES Scale, Pretreatment  4-->hurts little more  -MB           Oral Motor and Function    Dentition Assessment  missing teeth;teeth are in poor condition  -MB        Secretion Management  WNL/WFL  -MB        Mucosal Quality  moist, healthy  -MB           Oral Musculature and Cranial Nerve Assessment    Oral Motor General Assessment  WFL  -MB           General Eating/Swallowing Observations    Respiratory Support Currently in Use  room air  -MB        Eating/Swallowing Skills  fed by SLP  -MB        Positioning During Eating  upright in bed  -MB        Utensils Used  spoon;straw  -MB        Consistencies Trialed  thin liquids;nectar/syrup-thick liquids;honey-thick liquids;pudding thick  -MB           Clinical Swallow Eval    Oral Prep Phase  WFL  -MB        Oral Transit  WFL  -MB        Oral Residue  WFL  -MB        Pharyngeal Phase  no overt signs/symptoms of pharyngeal impairment  -MB        Esophageal Phase  suspected esophageal impairment  -MB         Clinical Swallow Evaluation Summary  Pudding, honey, nectar, and thin consistencies were presented. Pt had delayed swallow initiation. No overt s/s of aspiration were observed. Pt has history of extensive esophageal dysphagia due to Schatzki's ring. He's had multiple esophageal dilations. His son reports that he was not eating any solid foods for the last 3 weeks, but that he was drinking liquids. Pt states he still has no interest in solid foods. He was agreeable to trying pureed foods.   -MB           Esophageal Phase Concerns    Esophageal Phase Concerns  other (see comments)  -MB        Esophageal Phase Concerns, Comment  Hx of Schatzki's ring and multiple esophageal dilations  -MB           Clinical Impression    SLP Swallowing Diagnosis  functional oral phase;functional pharyngeal phase;esophageal dysfunction  -MB        Functional Impact  risk of malnutrition;risk of dehydration  -MB        Rehab Potential/Prognosis, Swallowing  adequate, monitor progress closely  -MB        Swallow Criteria for Skilled Therapeutic Interventions Met  demonstrates skilled criteria  -MB           Recommendations    Therapy Frequency (Swallow)  PRN  -MB        Predicted Duration Therapy Intervention (Days)  3 days  -MB        SLP Diet Recommendation  puree;thin liquids  -MB        Recommended Precautions and Strategies  upright posture during/after eating;small bites of food and sips of liquid;alternate between small bites of food and sips of liquid  -MB        SLP Rec. for Method of Medication Administration  meds crushed;with pudding or applesauce  -MB        Monitor for Signs of Aspiration  yes;cough;gurgly voice;throat clearing;pneumonia;notify SLP if any concerns  -MB        Anticipated Dischage Disposition (SLP)  home;other (see comments) with hospice  -MB           Swallow Goals (SLP)    Oral Nutrition/Hydration Goal Selection (SLP)  oral nutrition/hydration, SLP goal 1  -MB           Oral Nutrition/Hydration Goal 1 (SLP)     Oral Nutrition/Hydration Goal 1, SLP  Pt will tolerate least restrictive diet with no overt s/s of aspiration.   -MB        Time Frame (Oral Nutrition/Hydration Goal 1, SLP)  by discharge  -MB        Barriers (Oral Nutrition/Hydration Goal 1, SLP)  n/a  -MB        Progress/Outcomes (Oral Nutrition/Hydration Goal 1, SLP)  goal ongoing  -MB          User Key  (r) = Recorded By, (t) = Taken By, (c) = Cosigned By    Initials Name Effective Dates    Mahnaz Thomas, CCC-SLP 08/02/16 -           EDUCATION  The patient has been educated in the following areas:   Dysphagia (Swallowing Impairment).    SLP Recommendation and Plan  SLP Swallowing Diagnosis: functional oral phase, functional pharyngeal phase, esophageal dysfunction  SLP Diet Recommendation: puree, thin liquids  Recommended Precautions and Strategies: upright posture during/after eating, small bites of food and sips of liquid, alternate between small bites of food and sips of liquid  SLP Rec. for Method of Medication Administration: meds crushed, with pudding or applesauce     Monitor for Signs of Aspiration: yes, cough, gurgly voice, throat clearing, pneumonia, notify SLP if any concerns     Swallow Criteria for Skilled Therapeutic Interventions Met: demonstrates skilled criteria  Anticipated Dischage Disposition (SLP): home, other (see comments)(with hospice)  Rehab Potential/Prognosis, Swallowing: adequate, monitor progress closely  Therapy Frequency (Swallow): PRN  Predicted Duration Therapy Intervention (Days): 3 days       Plan of Care Reviewed With: patient  Outcome Summary: Clinical bedside swallow evaluation completed.Pudding, honey, nectar, and thin consistencies were presented. Pt had delayed swallow initiation. No overt s/s of aspiration were observed. Pt has history of extensive esophageal dysphagia due to Schatzki's ring. He's had multiple esophageal dilations. His son reports that he was not eating any solid foods for the last 3 weeks,  but that he was drinking liquids. Pt states he still has no interest in solid foods. He was agreeable to trying pureed foods. Recommend starting a puree diet and thin liquids. Meds crushed with applesauce or pudding. Ensure pt sitting upright for PO intake and for at least 30 minutes after PO. SLP will follow up to monitor diet tolerance and to make further diet changes as needed.    SLP GOALS     Row Name 08/21/20 0818             Oral Nutrition/Hydration Goal 1 (SLP)    Oral Nutrition/Hydration Goal 1, SLP  Pt will tolerate least restrictive diet with no overt s/s of aspiration.   -MB      Time Frame (Oral Nutrition/Hydration Goal 1, SLP)  by discharge  -MB      Barriers (Oral Nutrition/Hydration Goal 1, SLP)  n/a  -MB      Progress/Outcomes (Oral Nutrition/Hydration Goal 1, SLP)  goal ongoing  -MB        User Key  (r) = Recorded By, (t) = Taken By, (c) = Cosigned By    Initials Name Provider Type    Mahnaz Thomas CCC-SLP Speech and Language Pathologist             Time Calculation:   Time Calculation- SLP     Row Name 08/21/20 0930             Time Calculation- SLP    SLP Start Time  0818  -MB      SLP Stop Time  0930  -MB      SLP Time Calculation (min)  72 min  -MB      SLP Received On  08/21/20  -MB      SLP Goal Re-Cert Due Date  08/31/20  -MB        User Key  (r) = Recorded By, (t) = Taken By, (c) = Cosigned By    Initials Name Provider Type    Mahnaz Thomas CCC-SLP Speech and Language Pathologist          Therapy Charges for Today     Code Description Service Date Service Provider Modifiers Qty    [id number]  ST EVAL ORAL PHARYNG SWALLOW 5 8/21/2020 Mahnaz Goldstein CCC-SLP GN 1               TANJA Lindsey  8/21/2020

## 2020-08-21 NOTE — DISCHARGE PLACEMENT REQUEST
"Tyler Ibarra (80 y.o. Male)     Date of Birth Social Security Number Address Home Phone MRN    [id number]  [id number] [phone] [id number]    Sabianist Marital Status          Bahai        Admission Date Admission Type Admitting Provider Attending Provider Department, Room/Bed    8/20/20 Emergency Jaspreet Calderon MD Truong, Khai C, MD [facility] 3C, 361/1    Discharge Date Discharge Disposition Discharge Destination                       Attending Provider:  Jaspreet Calderon MD    Allergies:  No Known Allergies    Isolation:  None   Infection:  None   Code Status:  No CPR    Ht:  182.9 cm (72\")   Wt:  66.7 kg (147 lb)    Admission Cmt:  None   Principal Problem:  None                Active Insurance as of 8/20/2020     Primary Coverage     Payor Plan Insurance Group Employer/Plan Group    MEDICARE MEDICARE A & B      Payor Plan Address Payor Plan Phone Number Payor Plan Fax Number Effective Dates    [address] [phone]  7/1/2007 - None Entered    Formerly [facility] [id number]       Subscriber Name Subscriber Birth Date Member ID       TYLER IBARRA 1940 [id number]                 Emergency Contacts      (Rel.) Home Phone Work Phone Mobile Phone    Tyler Ibarra Jr (Son) [phone] -- --    Fahad Ibarra (Grandchild) [phone] -- --               History & Physical      Jaspreet Calderon MD at 08/20/20 1700              [facility] Medicine Services  HISTORY AND PHYSICAL    Date of Admission: 8/20/2020  Primary Care Physician: Samia Vargas MD    Subjective     Chief Complaint: Colon cancer with lung mets-worsening, L3 possible unstable fracture, L1 compression fracture, right rib fracture-10-11 ribs    History of Present Illness  Patient is a 80-year-old  male presented ER for frequent fall.  Patient also complained of back pain.  History of colon cancer with mets to the liver.  Patient has been " falling at home.  Patient is mostly bedbound at home.  CT scan shows L3 fracture possible unstable, compression fracture of L1, rib fracture right rib 10th-11 ribs.  Patient denies any loss of consciousness.  Patient did hit his head on the ground this morning.  Did complain of mild headaches and back pain from falling.  Patient is on chronic pain medication due to colon cancer, patient does not take his pain medication at home as directed.  Family wants patient in hospice.    Review of Systems   Unable to obtain due to altered mental status.  Patient denies any chest pain.  Patient no acute distress.  Otherwise as stated above.    Otherwise complete ROS reviewed and negative except as mentioned in the HPI.      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)    • Secondary malignant neoplasm of unspecified lung (CMS/HCC)    • Type 2 diabetes mellitus (CMS/HCC)    • Vitamin D deficiency        Past Surgical History:  Past Surgical History:   Procedure Laterality Date   • COLONOSCOPY N/A 1/6/2017    Diverticulosis; Malignant tumor in ascending colon-biopsied; One 20mm polyp in transverse colon-tattooed; Five 5-12mm polyps in transverse colon; One 7mm polyp at splenic flexure; Two 6-10mm polyps in descending colon; Two 5-8mm polyps in sigmoid colon; One 20mm polyp in the sigmoid colon-Clips placed; One 8mm polyp in rectum; Non-bleeding internal hemorrhoids; Repeat 1 year; See path report   • ENDOSCOPY  12/19/2014    HH; Non-obstructing Schatzki ring-dilated; Normal stomach; Normal examined duodenum   • ENDOSCOPY N/A 10/5/2017    LA Grade D esophagitis-biopsied; Esophageal  stenosis-dilated; Small HH; Normal stomach; Normal examined duodenum; Repeat 1 month for retreatment   • ENDOSCOPY N/A 11/2/2017    Small HH; Esophageal stenosis-biopsied; Normal stomach; Normal examined duodenum   • ENDOSCOPY N/A 12/11/2017    Medium-sized HH; Low-grade of narrowing and non-obstructing Schatzki ring-dilated; Normal stomach; Normal examined duodenum; No specimens collected; Repeat 4-6 weeks for retreatment   • ENDOSCOPY N/A 1/19/2018    Medium-sized HH; Non-obstructing Schatzki ring-dilated; Normal stomach; Normal examined duodenum; No specimens collected   • ENDOSCOPY N/A 8/31/2018    Benign-appearing esophageal stenosis-dilated; Small HH; Normal stomach; Normal examined duodenum; No specimens collected; Repeat 2-4 for retreatment   • ENDOSCOPY N/A 9/28/2018    Medium-sized HH; LA Grade D reflux esophagitis-dilated; Normal stomach; Normal examined duodenum; No specimens collected; Repeat 4-6 weeks   • ENDOSCOPY N/A 10/31/2018    Benign-appearing esophageal stenosis-dilated; Medium-sized HH; Normal stomach; Normal examined duodenum; No specimens collected   • ENDOSCOPY  11/18/2014    HH; Benign-appearing esophageal stricture-dilated; Normal stomach; Normal examined duodenum; Repeat 4 weeks for retreatment   • ENDOSCOPY N/A 10/17/2019    Benign-appearing esophageal stenosis-dilated; Small HH; Normal stomach; Normal examined duodenum; No specimens collected; Repeat 3 weeks   • ENDOSCOPY N/A 11/7/2019    Small HH; Benign-appearing esophageal stenosis-dilated; Normal stomach; Normal examined duodenum; No specimens collected; Repeat 3 weeks   • ENDOSCOPY N/A 12/2/2019    Benign-appearing esophageal stenosis-dilated; Medium-sized HH; Normal stomach; Normal examined duodenum; No specimens collected; Repeat 3 weeks   • ENDOSCOPY N/A 12/23/2019    Small HH; LA Grade D reflux esophagitis; Benign-appearing esophageal stenosis-dilated; Normal stomach; Normal examined duodenum; No specimens collected   •  "TONSILLECTOMY     • VENOUS ACCESS DEVICE (PORT) INSERTION         Family History: family history includes Aneurysm in his father; No Known Problems in his mother.    Social History:  reports that he quit smoking about 11 years ago. His smoking use included cigarettes. He has never used smokeless tobacco. He reports that he drinks alcohol. He reports that he does not use drugs.    Medications:  Prior to Admission medications    Medication Sig Start Date End Date Taking? Authorizing Provider   finasteride (PROSCAR) 5 MG tablet Take 5 mg by mouth Daily.    ProviderYves MD   pantoprazole (PROTONIX) 40 MG EC tablet Take 1 tablet by mouth 2 (Two) Times a Day Before Meals. 10/5/17   Haydee Krishnamurthy MD   sucralfate (CARAFATE) 1 g tablet Take 1 tablet by mouth 4 (Four) Times a Day. 12/23/19   Haydee Krishnamurthy MD   sucralfate (CARAFATE) 1 GM/10ML suspension Take 10 mL by mouth 4 (Four) Times a Day. 12/23/19   Haydee Krishnamurthy MD   tamsulosin (FLOMAX) 0.4 MG capsule 24 hr capsule Take 1 capsule by mouth Every Night.    Provider, MD Yves     Allergies:  No Known Allergies    Objective     Vital Signs: /78   Pulse 93   Temp 97.7 °F (36.5 °C) (Oral)   Resp 26   Ht 182.9 cm (72\")   Wt 66.7 kg (147 lb)   SpO2 95%   BMI 19.94 kg/m²    Physical Exam   Constitutional: He appears well-developed.   Cachectic.   HENT:   Head: Normocephalic.   Eyes: Pupils are equal, round, and reactive to light. Conjunctivae are normal.   Neck: Neck supple. No JVD present.   Cardiovascular: Normal rate, regular rhythm, normal heart sounds and intact distal pulses. Exam reveals no gallop and no friction rub.   No murmur heard.  Pulmonary/Chest: No respiratory distress. He has no wheezes. He has no rales. He exhibits no tenderness.   Diminished breath sound bilateral, clear.   Abdominal: Soft. Bowel sounds are normal. He exhibits no distension. There is no tenderness. There is no rebound and no guarding.   Musculoskeletal: He " exhibits no edema, tenderness or deformity.   Patient is curled up in fetal position.   Neurological: He displays normal reflexes. No cranial nerve deficit. He exhibits abnormal muscle tone. Coordination abnormal.   Skin: Skin is warm and dry. Capillary refill takes 2 to 3 seconds. No rash noted.   Psychiatric: He has a normal mood and affect.   Nursing note and vitals reviewed.          Results Reviewed:    Lab Results (last 24 hours)     Procedure Component Value Units Date/Time    COVID PRE-OP / PRE-PROCEDURE SCREENING ORDER (NO ISOLATION) - Swab, Nasopharynx [[id number]] Collected:  08/20/20 1701    Specimen:  Swab from Nasopharynx Updated:  08/20/20 1721    Narrative:       The following orders were created for panel order COVID PRE-OP / PRE-PROCEDURE SCREENING ORDER (NO ISOLATION) - Swab, Nasopharynx.  Procedure                               Abnormality         Status                     ---------                               -----------         ------                     COVID-19 ABBOTT IN-HOUS...[[id number]]                      In process                   Please view results for these tests on the individual orders.    COVID-19 ABBOTT IN-HOUSE,NP Swab (NO TRANSPORT MEDIA) 2 HR TAT - Swab, Nasopharynx [[id number]] Collected:  08/20/20 1701    Specimen:  Swab from Nasopharynx Updated:  08/20/20 1721    Comprehensive Metabolic Panel [[id number]]  (Abnormal) Collected:  08/20/20 1037    Specimen:  Blood from Arm, Right Updated:  08/20/20 1108     Glucose 71 mg/dL      BUN 27 mg/dL      Creatinine 0.90 mg/dL      Sodium 141 mmol/L      Potassium 3.9 mmol/L      Chloride 98 mmol/L      CO2 22.0 mmol/L      Calcium 8.9 mg/dL      Total Protein 5.9 g/dL      Albumin 2.50 g/dL      ALT (SGPT) 21 U/L      AST (SGOT) 66 U/L      Alkaline Phosphatase 285 U/L      Total Bilirubin 1.3 mg/dL      eGFR Non African Amer 82 mL/min/1.73      Globulin 3.4 gm/dL      A/G Ratio 0.7 g/dL      BUN/Creatinine Ratio 30.0     Anion  Gap 21.0 mmol/L     Narrative:       GFR Normal >60  Chronic Kidney Disease <60  Kidney Failure <15      CBC & Differential [[id number]] Collected:  08/20/20 1037    Specimen:  Blood from Arm, Right Updated:  08/20/20 1051    Narrative:       The following orders were created for panel order CBC & Differential.  Procedure                               Abnormality         Status                     ---------                               -----------         ------                     CBC Auto Differential[[id number]]        Abnormal            Final result                 Please view results for these tests on the individual orders.    CBC Auto Differential [[id number]]  (Abnormal) Collected:  08/20/20 1037    Specimen:  Blood from Arm, Right Updated:  08/20/20 1051     WBC 8.57 10*3/mm3      RBC 4.11 10*6/mm3      Hemoglobin 11.2 g/dL      Hematocrit 35.5 %      MCV 86.4 fL      MCH 27.3 pg      MCHC 31.5 g/dL      RDW 18.8 %      RDW-SD 58.5 fl      MPV 9.4 fL      Platelets 123 10*3/mm3      Neutrophil % 81.2 %      Lymphocyte % 9.5 %      Monocyte % 8.9 %      Eosinophil % 0.0 %      Basophil % 0.2 %      Immature Grans % 0.2 %      Neutrophils, Absolute 6.96 10*3/mm3      Lymphocytes, Absolute 0.81 10*3/mm3      Monocytes, Absolute 0.76 10*3/mm3      Eosinophils, Absolute 0.00 10*3/mm3      Basophils, Absolute 0.02 10*3/mm3      Immature Grans, Absolute 0.02 10*3/mm3      nRBC 0.0 /100 WBC            Radiology Data:    Imaging Results (Last 24 Hours)     Procedure Component Value Units Date/Time    CT Lumbar Spine Without Contrast [[id number]] Collected:  08/20/20 1119     Updated:  08/20/20 1128    Narrative:       EXAMINATION: CT LUMBAR SPINE WO CONTRAST- 8/20/2020 11:19 AM CDT     HISTORY: Low back pain after fall     COMPARISON: Nuclear medicine PET CT exam 9/24/2018, CT abdomen and  pelvis with contrast 6/24/2017     DOSE: 433 mGy-cm     TECHNIQUE: Sequential imaging was performed through the lumbar  spine  without the use of IV contrast.  Sagittal and coronal reformations were  made from the original source data and reviewed. Automated exposure  control was also utilized to decrease patient radiation dose.     FINDINGS:   Alignment of the lumbar spine appears normal. An old L1 compression  deformity is evident. When compared with the exam from 2017, there is a  new superior endplate deformity of L3 which results in 40% loss of  vertebral body height. This appears to involve the anterior and middle  vertebral columns with extension into the posterior margin of the  vertebral body. There is no evidence of perched facet. The spinous  processes appear intact. The patient appears osteopenic. There is  curvature at the thoracolumbar junction, which may be positional.  Multilevel facet hypertrophy is present. Degenerative changes are noted  in the SI joints.     Multilevel degenerative disc disease is evident. There is bilateral  neuroforaminal narrowing at L4-L5. There is right greater than left  neural foraminal narrowing at L5-S1. There is diffuse disc bulge at  L5-S1. No critical spinal canal stenosis is identified.     Review of the visualized paraspinal soft tissues demonstrates a low  density lesion extending from the right kidney which is incompletely  characterized on this exam but present on the previous PET CT exam and  likely a cyst. Atherosclerotic calcifications are seen in the aorta and  its branch vessels.        Impression:       1. When compared with the most recent exam, there is a new superior  endplate deformity of the L3 vertebral body involving the anterior and  middle columns. This is considered a possibly unstable fracture.     2. Stable compression deformity at L1.     Findings were called to Dr. Mancuso in the emergency department at 11:24  AM on 8/20/2020.     This report was finalized on 08/20/2020 11:25 by Dr. Neal Leon MD.    CT Thoracic Spine Without Contrast [[id number]] Collected:   08/20/20 1114     Updated:  08/20/20 1124    Narrative:       EXAM: CT THORACIC SPINE WO CONTRAST- - 8/20/2020 10:46 AM CDT     HISTORY: T/L-spine trauma, minor-mod, low back pain       COMPARISON: 6/24/2017.      DOSE LENGTH PRODUCT: 481 mGy cm. Automated exposure control was also  utilized to decrease patient radiation dose.     TECHNIQUE: Unenhanced CT of the thoracic spine with multiplanar  reformats.     FINDINGS:  T1 outside the field-of-view. T2 to mid L2 visualized.      Similar chronic height loss of L1.     Minimal central age-indeterminate height loss of T4. Remaining vertebral  body heights within normal limits. Mild diffuse disc height loss.  Posterior elements appear intact.     Subacute appearing fractures of posterior right 10th and 11th ribs on  axial series 4, image 63 and 72.     Limited evaluation of the spinal cord due to CT technique.     Calcified aortic atherosclerosis. Descending thoracic aorta measures 3.3  cm at the diaphragm, enlarged.     Multiple pulmonary nodules. Comparison to prior PET/CT 9/24/2018 is  limited due to technique, however nodules appear increased in size.  Specifically, left lower lobe with a 1.9 cm pulmonary nodule on axial  series 4, image 56, previously 1.3 cm on 9/24/2018.          Impression:       1. Minimal age indeterminate central height loss of T4. Correlate with  patient's symptoms.  2. Subacute appearing fractures of the posterior right 10th and 11th  ribs.  3. Multiple pulmonary nodules, which appear increased compared to PET/CT  9/24/2018, concerning for progression of metastatic disease. Consider  comparison with interval outside imaging.  4. Descending thoracic aorta enlarged measuring 3.3 cm at the level of  the diaphragm.  This report was finalized on 08/20/2020 11:21 by Dr Keely Mandel MD.    CT Cervical Spine Without Contrast [[id number]] Collected:  08/20/20 1114     Updated:  08/20/20 1121    Narrative:       EXAMINATION: CT CERVICAL SPINE WO  CONTRAST- 8/20/2020 11:14 AM CDT     HISTORY: Neck pain after fall     COMPARISON: None     DOSE: 277 mGy-cm     TECHNIQUE: Sequential imaging was performed through the cervical spine  without the use of IV contrast.  Sagittal and coronal reformations were  made from the original source data and reviewed. Automated exposure  control was also utilized to decrease patient radiation dose.     FINDINGS:   The patient is diffusely osteopenic. Alignment of the cervical spine  appears normal. Vertebral body heights appear well maintained. There is  no evidence of perched facet. The spinous processes appear intact.  Alignment of the odontoid with lateral masses of C1 appears grossly  normal. The occipital condyles appear intact. 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 There is no evidence of acute  fracture. Multilevel facet hypertrophy and degenerative neuroforaminal  narrowing is identified. No critical spinal canal stenosis is  identified. No prevertebral edema is appreciated.     The visualized lung apices appear clear. No acute soft tissue  abnormalities are identified.           Impression:       1. No evidence of acute osseous injury in the cervical spine.     This report was finalized on 08/20/2020 11:18 by Dr. Neal Leon MD.    CT Head Without Contrast [[id number]] Collected:  08/20/20 1112     Updated:  08/20/20 1117    Narrative:       EXAM: CT HEAD WO CONTRAST- - 8/20/2020 10:46 AM CDT     HISTORY: Head trauma, headache       COMPARISON: No existing relevant imaging studies available.      DOSE LENGTH PRODUCT: 684 mGy cm. Automated exposure control was also  utilized to decrease patient radiation dose.     TECHNIQUE: Unenhanced CT images obtained from vertex to skull base with  multiplanar reformats.     FINDINGS:  No acute intracranial hemorrhage, midline shift, mass effect or large  territory cortical infarct. Ventricles, sulci and basilar cisterns  proportionally prominent suggesting  volume loss. Patchy periventricular  and subcortical white matter hypodensity, most commonly due to chronic  microvascular changes. Vascular calcifications present.     Globes, retrobulbar soft tissues, paranasal sinuses, mastoid air cells  and external auditory canals are within normal limits. Calvarium appears  intact. Subcutaneous tissues within normal limits.     Patchy mineralization of the calvarium.          Impression:       1. No acute intracranial findings.   2. Moderate chronic microvascular changes and volume loss.  3. Patchy mineralization of the calvarium, could be seen with  osteopenia, but infiltrative process such as multiple myeloma not  excluded. Recommend correlation with patient history.  This report was finalized on 08/20/2020 11:14 by Dr Keely Mandel MD.          I have personally reviewed and interpreted the radiology studies and ECG obtained at time of admission.     Assessment / Plan      Assessment & Plan  Active Hospital Problems    Diagnosis   • Carcinoma of colon metastatic to lung (CMS/HCC)   • L3 vertebral fracture (CMS/HCC)   • Rib fractures   • Closed compression fracture of body of L1 vertebra (CMS/HCC)   • Protein calorie malnutrition (CMS/HCC)   • Weight loss   • Other dysphagia   • Gastroesophageal reflux disease without esophagitis   • Liver cancer (CMS/HCC)   • Colon cancer (CMS/HCC)   • Schatzki's ring     Dilated up to 12 mm December 2017     • Hiatal hernia     Plans    Colon cancer with progressive mets to the lungs.  Plan for hospice.    L3 fracture/falling/L1 compression fracture/height loss T4, subacute posterior rib fracture right 10th and 11th ribs/.  Morphine PRN.  Percocet PRN.  Cervical spine-No evidence of acute osseous injury in the cervical spine.  CT scan of the head-No acute intracranial findings, Moderate chronic microvascular changes and volume loss,  Patchy mineralization of the calvarium- could be seen with  Osteopenia- but infiltrative process such as  multiple myeloma not excluded.   CT scan L-spine - new superior endplate deformity of the L3 vertebral body involving the anterior and middle columns- this is considered a possibly unstable fracture, stable compression fracture of L1.  CT scan of T-spine-Minimal age indeterminate central height loss of T4, subacute appearing fractures of the posterior right 10th and 11th ribs, multiple pulmonary nodules, which appear increased compared to PET/CT 9/24/2018, concerning for progression of metastatic disease, descending thoracic aorta enlargement measuring 3.3 cm at the level of diaphragm.  .  Dehydration.  IV fluids.    Nausea/vomiting/reflux/dysphasia history of Schatzki's ring/history of multiple esophageal dilatation.  Speech evaluate for feeding.  Pepcid IV.  Zofran PRN.    Anemia.  Will follow    Code Status: DNR.  DNI.  [facility]     I discussed the patient's findings and my recommendations with: Patient and son.    Estimated length of stay: 1 to 3 days.    Electronically signed by Jaspreet Calderon MD, 08/20/20, 5:00 PM.              Electronically signed by Jaspreet Calderon MD at 08/20/20 175       Physician Progress Notes (most recent note)    No notes of this type exist for this encounter.            Consult Notes (most recent note)      Byron Hall, APRN at 08/20/20 1318          NEUROSURGERY INITIAL HOSPITAL ENCOUNTER    Assessment/Plan:   Jonas Snow is a 80 y.o. 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 Mac with the [facility], 5-year history of chemotherapy recently discontinued over the past 3 weeks; type 2 diabetes, and vitamin D deficiency.  He presents today for further evaluation post fall.  Physical exam findings of ill-appearing and emaciated, diffuse bruises and skin tears to the upper and lower  extremities, GCS 15, oriented x3, names objects, decreased sensation to the bilateral lower extremities from the knees, global weakness, and global hyporeflexia.  Their imaging: CT the head shows no acute fractures or intracranial abnormalities.  CT of the cervical spine shows no acute fractures, multilevel degenerative changes.  MRI of the thoracic spine shows a questionable type A1 compression deformity at T4.  MRI of the lumbar spine shows chronic appearing wedge-shaped compression deformity to L1 and acute appearing type A1 wedge-shaped compression fracture at L3, no significant malalignment, multilevel degenerative changes.    Differential Diagnosis:   Adenocarcinoma of the sigmoid colon with mets to the liver, lung, and prostate  Physical deconditioning  Global weakness  Fall  Acute appearing wedge-shaped compression deformities to T4 and L3    Recommendations:  Dr. Wilks reviewed all imaging and recommends the following …    Considering Mr. Harris current physical state, no surgical intervention is warranted nor most likely be tolerated at this time.  Mr. Snow should be placed in an LSO brace for comfort and stability.  The LSO brace should be worn at all times while out of bed.  Max analgesics as needed for pain.  Avoid NSAIDs.  We would further recommend palliative care for aggressive end-stage, terminal cancer management.  Neurosurgery will continue to follow as needed.  Mr. Snow may follow-up with the neurosurgical clinic in 2-4 weeks for reassessment.     After brief discussion with family, Mr. Snow and son, Jonas Snow Jr have elected to decline any and all surgical intervention and proceed with comfort care measures only.    I discussed the patients findings and my recommendations with patient, family and consulting provider     Consult at the request of emergency department physician, Dr. Keely Cabrera    Thank you very much for this interesting consult.     Level of Risk: Moderate due to:  undiagnosed new problem  MDM: Moderate Complexity  Mod = 37153, High=99223  ________________________________________________________________    Reason for consult: Fall    Chief Complaint:   Chief Complaint   Patient presents with   • Fall     HPI: Jonas Snow is a 80 y.o. 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 Mac with the [facility], 5-year history of chemotherapy recently discontinued over the past 3 weeks; type 2 diabetes, and vitamin D deficiency.      Despite progressive cancer and long-term chemotherapy, family states Jonas has maintained relatively good health and been fairly physically independent.  Approximately 3 weeks ago, Mr. Harris chemotherapy was discontinued by his oncologist.   Since this time, family reports a significant rapid decline in his overall health and are currently pursuing hospice care.     Mr. Snow presents today [facility] ED with his son, Jonas Snow Jr. for further evaluation post fall.  Family states Mr. Snow has fallen from bed twice over the past 2 days.  His most recent fall occurred this morning at approximately 2 AM and he was found lying on the floor by his son at approximately 7 AM.  Mr. Snow states he hit his head during the fall, however he is unsure if he lost consciousness.  He was unable to stand unassisted.  Upon family's arrival, EMS was notified, and Jonas was transported to [facility] ED for further evaluation and care.    Currently, Mr. Snow denies headaches, or neck, thoracic, or lumbar back pain.  He denies upper or lower extremity radicular pain.  He does report global weakness, as well as numbness and tingling to the bilateral lower extremities in a stocking and glove distribution from the knees.  He additionally denies fevers, chills, night sweats, saddle anesthesia, or bowel or bladder dysfunction.  He currently rates  the severity of his symptoms 4/10.     Review of Systems   Constitutional: Positive for activity change, appetite change and unexpected weight change. Negative for chills and fever.   HENT: Negative.    Eyes: Negative.    Respiratory: Negative.    Cardiovascular: Negative.    Gastrointestinal: Negative.    Endocrine: Negative.    Genitourinary: Negative.    Musculoskeletal: Positive for gait problem. Negative for back pain, neck pain and neck stiffness.   Skin: Negative.    Allergic/Immunologic: Negative.    Neurological: Positive for weakness and numbness.   Hematological: Negative.    Psychiatric/Behavioral: Negative.    All other systems reviewed and are negative.     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    Past Surgical History:  has a past surgical history that includes Tonsillectomy; Esophagogastroduodenoscopy (12/19/2014); Venous Access Device (Port); Colonoscopy (N/A, 1/6/2017); Esophagogastroduodenoscopy (N/A, 10/5/2017); Esophagogastroduodenoscopy (N/A, 11/2/2017); Esophagogastroduodenoscopy (N/A, 12/11/2017); Esophagogastroduodenoscopy (N/A, 1/19/2018); Esophagogastroduodenoscopy (N/A, 8/31/2018); Esophagogastroduodenoscopy (N/A, 9/28/2018); Esophagogastroduodenoscopy (N/A, 10/31/2018); Esophagogastroduodenoscopy (11/18/2014); Esophagogastroduodenoscopy (N/A, 10/17/2019); Esophagogastroduodenoscopy (N/A, 11/7/2019); Esophagogastroduodenoscopy (N/A, 12/2/2019); and Esophagogastroduodenoscopy (N/A, 12/23/2019).    Family History: family history is not on file.    Social  History:  reports that he quit smoking about 11 years ago. His smoking use included cigarettes. He has never used smokeless tobacco. He reports that he drinks alcohol. He reports that he does not use drugs.    Allergies: Patient has no known allergies.    Home Medications:   Current Facility-Administered Medications:   •  morphine injection 4 mg, 4 mg, Intravenous, Once, Keely Mancuso MD    Current Outpatient Medications:   •  finasteride (PROSCAR) 5 MG tablet, Take 5 mg by mouth Daily., Disp: , Rfl:   •  pantoprazole (PROTONIX) 40 MG EC tablet, Take 1 tablet by mouth 2 (Two) Times a Day Before Meals., Disp: 60 tablet, Rfl: 11  •  sucralfate (CARAFATE) 1 g tablet, Take 1 tablet by mouth 4 (Four) Times a Day., Disp: 120 tablet, Rfl: 0  •  sucralfate (CARAFATE) 1 GM/10ML suspension, Take 10 mL by mouth 4 (Four) Times a Day., Disp: 3600 mL, Rfl: 2  •  tamsulosin (FLOMAX) 0.4 MG capsule 24 hr capsule, Take 1 capsule by mouth Every Night., Disp: , Rfl:     Medications: Scheduled Meds:  Morphine 4 mg Intravenous Once     Continuous Infusions:   PRN Meds:.    Vital Signs  Temp:  [97.7 °F (36.5 °C)] 97.7 °F (36.5 °C)  Heart Rate:  [] 87  Resp:  [26] 26  BP: (113-148)/(55-94) 134/64    Physical Exam  Physical Exam   Constitutional: He is oriented to person, place, and time. Vital signs are normal. He appears well-developed. He is cooperative.  Non-toxic appearance. He has a sickly appearance. He appears ill. No distress.   BMI 19.94   HENT:   Head: Normocephalic and atraumatic.   Right Ear: Hearing normal.   Left Ear: Hearing normal.   Mouth/Throat: Mucous membranes are dry.   Eyes: Pupils are equal, round, and reactive to light. Conjunctivae and EOM are normal.   Neck: Trachea normal and full passive range of motion without pain. Neck supple.   Cardiovascular: Normal rate and regular rhythm.   Pulmonary/Chest: Effort normal. No accessory muscle usage. No apnea, no tachypnea and no bradypnea. No respiratory  distress.   Abdominal: Soft. Normal appearance.   Neurological: He is alert and oriented to person, place, and time. GCS eye subscore is 4. GCS verbal subscore is 5. GCS motor subscore is 6.   Reflex Scores:       Tricep reflexes are 1+ on the right side and 1+ on the left side.       Bicep reflexes are 1+ on the right side and 1+ on the left side.       Brachioradialis reflexes are 1+ on the right side and 1+ on the left side.       Patellar reflexes are 1+ on the right side and 1+ on the left side.       Achilles reflexes are 1+ on the right side and 1+ on the left side.  Skin: Skin is warm and dry. He is not diaphoretic.   Diffuse bruises and skin tears to the upper and lower extremities   Psychiatric: He has a normal mood and affect. His speech is normal and behavior is normal.   Nursing note and vitals reviewed.      Neurologic Exam     Mental Status   Oriented to person, place, and time.   Attention: normal. Concentration: normal.   Speech: speech is normal   Level of consciousness: alert  Able to name object.     Awake, oriented x3, his oral mucous membranes are visibly dry garbling of speech, names objects, follows commands without prompting showing thumbs up and 2 fingers bilaterally     Cranial Nerves     CN II   Visual fields full to confrontation.     CN III, IV, VI   Pupils are equal, round, and reactive to light.  Extraocular motions are normal.     CN V   Facial sensation intact.     CN VII   Facial expression full, symmetric.     CN VIII   CN VIII normal.     CN IX, X   CN IX normal.     CN XI   CN XI normal.     Motor Exam   Right arm tone: normal  Left arm tone: normal  Right arm pronator drift: absent  Left arm pronator drift: absent  Right leg tone: normal  Left leg tone: normal    Strength   Right deltoid: 4/5  Left deltoid: 4/5  Right biceps: 4/5  Left biceps: 4/5  Right triceps: 4/5  Left triceps: 4/5  Right wrist flexion: 4/5  Left wrist flexion: 4/5  Right wrist extension: 4/5  Left wrist  extension: 4/5  Right iliopsoas: 4/5  Left iliopsoas: 4/5  Right quadriceps: 4/5  Left quadriceps: 4/5  Right anterior tibial: 4/5  Left anterior tibial: 4/5  Right posterior tibial: 4/5  Left posterior tibial: 4/5  Moves all extremities however globally weak     Sensory Exam   Right arm light touch: normal  Left arm light touch: normal  Right leg light touch: decreased from knee  Left leg light touch: decreased from knee    Gait, Coordination, and Reflexes     Tremor   Resting tremor: absent  Intention tremor: absent  Action tremor: absent    Reflexes   Right brachioradialis: 1+  Left brachioradialis: 1+  Right biceps: 1+  Left biceps: 1+  Right triceps: 1+  Left triceps: 1+  Right patellar: 1+  Left patellar: 1+  Right achilles: 1+  Left achilles: 1+  Right : 3+  Left : 3+  Right plantar: equivocal  Left plantar: equivocal  Right Neves: absent  Left Neves: absent  Right ankle clonus: absent  Left ankle clonus: absent  Right pendular knee jerk: absent  Left pendular knee jerk: absent    Results Review:   Independent review and interpretation of imaging  Imaging Results (Last 24 Hours)     Procedure Component Value Units Date/Time    CT Lumbar Spine Without Contrast [[id number]] Collected:  08/20/20 1119     Updated:  08/20/20 1128    Narrative:       EXAMINATION: CT LUMBAR SPINE WO CONTRAST- 8/20/2020 11:19 AM CDT     HISTORY: Low back pain after fall     COMPARISON: Nuclear medicine PET CT exam 9/24/2018, CT abdomen and  pelvis with contrast 6/24/2017     DOSE: 433 mGy-cm     TECHNIQUE: Sequential imaging was performed through the lumbar spine  without the use of IV contrast.  Sagittal and coronal reformations were  made from the original source data and reviewed. Automated exposure  control was also utilized to decrease patient radiation dose.     FINDINGS:   Alignment of the lumbar spine appears normal. An old L1 compression  deformity is evident. When compared with the exam from 2017, there is a  new  superior endplate deformity of L3 which results in 40% loss of  vertebral body height. This appears to involve the anterior and middle  vertebral columns with extension into the posterior margin of the  vertebral body. There is no evidence of perched facet. The spinous  processes appear intact. The patient appears osteopenic. There is  curvature at the thoracolumbar junction, which may be positional.  Multilevel facet hypertrophy is present. Degenerative changes are noted  in the SI joints.     Multilevel degenerative disc disease is evident. There is bilateral  neuroforaminal narrowing at L4-L5. There is right greater than left  neural foraminal narrowing at L5-S1. There is diffuse disc bulge at  L5-S1. No critical spinal canal stenosis is identified.     Review of the visualized paraspinal soft tissues demonstrates a low  density lesion extending from the right kidney which is incompletely  characterized on this exam but present on the previous PET CT exam and  likely a cyst. Atherosclerotic calcifications are seen in the aorta and  its branch vessels.        Impression:       1. When compared with the most recent exam, there is a new superior  endplate deformity of the L3 vertebral body involving the anterior and  middle columns. This is considered a possibly unstable fracture.     2. Stable compression deformity at L1.     Findings were called to Dr. Mancuso in the emergency department at 11:24  AM on 8/20/2020.     This report was finalized on 08/20/2020 11:25 by Dr. Neal Leon MD.    CT Thoracic Spine Without Contrast [[id number]] Collected:  08/20/20 1114     Updated:  08/20/20 1124    Narrative:       EXAM: CT THORACIC SPINE WO CONTRAST- - 8/20/2020 10:46 AM CDT     HISTORY: T/L-spine trauma, minor-mod, low back pain       COMPARISON: 6/24/2017.      DOSE LENGTH PRODUCT: 481 mGy cm. Automated exposure control was also  utilized to decrease patient radiation dose.     TECHNIQUE: Unenhanced CT of the thoracic  spine with multiplanar  reformats.     FINDINGS:  T1 outside the field-of-view. T2 to mid L2 visualized.      Similar chronic height loss of L1.     Minimal central age-indeterminate height loss of T4. Remaining vertebral  body heights within normal limits. Mild diffuse disc height loss.  Posterior elements appear intact.     Subacute appearing fractures of posterior right 10th and 11th ribs on  axial series 4, image 63 and 72.     Limited evaluation of the spinal cord due to CT technique.     Calcified aortic atherosclerosis. Descending thoracic aorta measures 3.3  cm at the diaphragm, enlarged.     Multiple pulmonary nodules. Comparison to prior PET/CT 9/24/2018 is  limited due to technique, however nodules appear increased in size.  Specifically, left lower lobe with a 1.9 cm pulmonary nodule on axial  series 4, image 56, previously 1.3 cm on 9/24/2018.          Impression:       1. Minimal age indeterminate central height loss of T4. Correlate with  patient's symptoms.  2. Subacute appearing fractures of the posterior right 10th and 11th  ribs.  3. Multiple pulmonary nodules, which appear increased compared to PET/CT  9/24/2018, concerning for progression of metastatic disease. Consider  comparison with interval outside imaging.  4. Descending thoracic aorta enlarged measuring 3.3 cm at the level of  the diaphragm.  This report was finalized on 08/20/2020 11:21 by Dr Keely Mandel MD.    CT Cervical Spine Without Contrast [[id number]] Collected:  08/20/20 1114     Updated:  08/20/20 1121    Narrative:       EXAMINATION: CT CERVICAL SPINE WO CONTRAST- 8/20/2020 11:14 AM CDT     HISTORY: Neck pain after fall     COMPARISON: None     DOSE: 277 mGy-cm     TECHNIQUE: Sequential imaging was performed through the cervical spine  without the use of IV contrast.  Sagittal and coronal reformations were  made from the original source data and reviewed. Automated exposure  control was also utilized to decrease patient  radiation dose.     FINDINGS:   The patient is diffusely osteopenic. Alignment of the cervical spine  appears normal. Vertebral body heights appear well maintained. There is  no evidence of perched facet. The spinous processes appear intact.  Alignment of the odontoid with lateral masses of C1 appears grossly  normal. The occipital condyles appear intact. 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 There is no evidence of acute  fracture. Multilevel facet hypertrophy and degenerative neuroforaminal  narrowing is identified. No critical spinal canal stenosis is  identified. No prevertebral edema is appreciated.     The visualized lung apices appear clear. No acute soft tissue  abnormalities are identified.           Impression:       1. No evidence of acute osseous injury in the cervical spine.     This report was finalized on 08/20/2020 11:18 by Dr. Neal Leon MD.    CT Head Without Contrast [[id number]] Collected:  08/20/20 1112     Updated:  08/20/20 1117    Narrative:       EXAM: CT HEAD WO CONTRAST- - 8/20/2020 10:46 AM CDT     HISTORY: Head trauma, headache       COMPARISON: No existing relevant imaging studies available.      DOSE LENGTH PRODUCT: 684 mGy cm. Automated exposure control was also  utilized to decrease patient radiation dose.     TECHNIQUE: Unenhanced CT images obtained from vertex to skull base with  multiplanar reformats.     FINDINGS:  No acute intracranial hemorrhage, midline shift, mass effect or large  territory cortical infarct. Ventricles, sulci and basilar cisterns  proportionally prominent suggesting volume loss. Patchy periventricular  and subcortical white matter hypodensity, most commonly due to chronic  microvascular changes. Vascular calcifications present.     Globes, retrobulbar soft tissues, paranasal sinuses, mastoid air cells  and external auditory canals are within normal limits. Calvarium appears  intact. Subcutaneous tissues within normal limits.     Patchy  mineralization of the calvarium.          Impression:       1. No acute intracranial findings.   2. Moderate chronic microvascular changes and volume loss.  3. Patchy mineralization of the calvarium, could be seen with  osteopenia, but infiltrative process such as multiple myeloma not  excluded. Recommend correlation with patient history.  This report was finalized on 08/20/2020 11:14 by Dr Keely Mandel MD.        MRI brain:  MRI spine:   CT Head:    CT c-spine:    CT t-spine:    CT l-spine:    X-ray:    I reviewed the patient's new clinical results.  Lab Results (last 24 hours)     Procedure Component Value Units Date/Time    Comprehensive Metabolic Panel [[id number]]  (Abnormal) Collected:  08/20/20 1037    Specimen:  Blood from Arm, Right Updated:  08/20/20 1108     Glucose 71 mg/dL      BUN 27 mg/dL      Creatinine 0.90 mg/dL      Sodium 141 mmol/L      Potassium 3.9 mmol/L      Chloride 98 mmol/L      CO2 22.0 mmol/L      Calcium 8.9 mg/dL      Total Protein 5.9 g/dL      Albumin 2.50 g/dL      ALT (SGPT) 21 U/L      AST (SGOT) 66 U/L      Alkaline Phosphatase 285 U/L      Total Bilirubin 1.3 mg/dL      eGFR Non African Amer 82 mL/min/1.73      Globulin 3.4 gm/dL      A/G Ratio 0.7 g/dL      BUN/Creatinine Ratio 30.0     Anion Gap 21.0 mmol/L     Narrative:       GFR Normal >60  Chronic Kidney Disease <60  Kidney Failure <15      CBC & Differential [[id number]] Collected:  08/20/20 1037    Specimen:  Blood from Arm, Right Updated:  08/20/20 1051    Narrative:       The following orders were created for panel order CBC & Differential.  Procedure                               Abnormality         Status                     ---------                               -----------         ------                     CBC Auto Differential[[id number]]        Abnormal            Final result                 Please view results for these tests on the individual orders.    CBC Auto Differential [[id number]]  (Abnormal)  Collected:  08/20/20 1037    Specimen:  Blood from Arm, Right Updated:  08/20/20 1051     WBC 8.57 10*3/mm3      RBC 4.11 10*6/mm3      Hemoglobin 11.2 g/dL      Hematocrit 35.5 %      MCV 86.4 fL      MCH 27.3 pg      MCHC 31.5 g/dL      RDW 18.8 %      RDW-SD 58.5 fl      MPV 9.4 fL      Platelets 123 10*3/mm3      Neutrophil % 81.2 %      Lymphocyte % 9.5 %      Monocyte % 8.9 %      Eosinophil % 0.0 %      Basophil % 0.2 %      Immature Grans % 0.2 %      Neutrophils, Absolute 6.96 10*3/mm3      Lymphocytes, Absolute 0.81 10*3/mm3      Monocytes, Absolute 0.76 10*3/mm3      Eosinophils, Absolute 0.00 10*3/mm3      Basophils, Absolute 0.02 10*3/mm3      Immature Grans, Absolute 0.02 10*3/mm3      nRBC 0.0 /100 WBC         KRISTIAN Dean          Electronically signed by Byron Hall APRN at 08/20/20 1707       Physical Therapy Notes (most recent note)    No notes exist for this encounter.         Occupational Therapy Notes (most recent note)    No notes exist for this encounter.         Discharge Summary    No notes of this type exist for this encounter.

## 2020-08-21 NOTE — PLAN OF CARE
Problem: Patient Care Overview  Goal: Plan of Care Review  Outcome: Ongoing (interventions implemented as appropriate)  Flowsheets (Taken 8/21/2020 2053)  Progress: no change  Plan of Care Reviewed With: patient  Outcome Summary: Pt has been started on a pureed diet consistency per ST following CBSE today. He reports he has not eaten any solid foods in the last 3 weeks but has continued to drink fluids. He has hx of dysphagia, shatzki's ring, and multiple esophageal dilations. He is not that interested in solids but does agree to try pureed consistency. Pt has colon CA with mets. He also had a fall with a T4, L3 fx. Recommend to send magic cup with lunch and dinner. Pt desires limited interventions.

## 2020-08-21 NOTE — PROGRESS NOTES
NEUROSURGERY DAILY PROGRESS NOTE    ASSESSMENT:   Jonas Snow is a 80 y.o. 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 Mac with the [facility], 5-year history of chemotherapy recently discontinued over the past 3 weeks; type 2 diabetes, and vitamin D deficiency.  He presents today for further evaluation post fall.  Physical exam findings of ill-appearing and emaciated, diffuse bruises and skin tears to the upper and lower extremities, GCS 15, oriented x3, names objects, decreased sensation to the bilateral lower extremities from the knees, global weakness, and global hyporeflexia.  Their imaging: CT the head shows no acute fractures or intracranial abnormalities.  CT of the cervical spine shows no acute fractures, multilevel degenerative changes.  MRI of the thoracic spine shows a questionable type A1 compression deformity at T4.  MRI of the lumbar spine shows chronic appearing wedge-shaped compression deformity to L1 and acute appearing type A1 wedge-shaped compression fracture at L3, no significant malalignment, multilevel degenerative changes.    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)    • Secondary malignant neoplasm of unspecified lung (CMS/HCC)    • Type 2 diabetes mellitus (CMS/HCC)    • Vitamin D deficiency      Active Hospital Problems    Diagnosis   • Carcinoma of colon metastatic to lung (CMS/HCC)   • L3 vertebral fracture (CMS/HCC)   • Rib fractures   • Closed compression fracture of body of L1 vertebra (CMS/HCC)   • Protein calorie malnutrition (CMS/HCC)  "  • Weight loss   • Other dysphagia   • Gastroesophageal reflux disease without esophagitis   • Liver cancer (CMS/HCC)   • Colon cancer (CMS/HCC)   • Schatzki's ring     Dilated up to 12 mm December 2017     • Hiatal hernia     HPI: Appears to be resting comfortably with family at bedside.  No significant complaints of back pain.  No acute events overnight.    PLAN:   Neuro: Stable.  Neuro unchanged   Acute appearing T4 and L3 fractures per CT scan   Given Mr. Snow's terminal illness, both Mr. Snow and family have elected to forego any invasive procedures.   Continue analgesics for pain.  Avoid NSAIDs.   Neurosurgery will continue to follow as needed.    CHIEF COMPLAINT:   Fall    Subjective  Symptoms stable.    Temp:  [97.5 °F (36.4 °C)-98 °F (36.7 °C)] 98 °F (36.7 °C)  Heart Rate:  [] 82  Resp:  [18-20] 18  BP: (102-148)/(44-94) 103/67    Objective:  General Appearance:  Comfortable and in no acute distress.    Vital signs: (most recent): Blood pressure 103/67, pulse 82, temperature 98 °F (36.7 °C), temperature source Axillary, resp. rate 18, height 182.9 cm (72\"), weight 66.7 kg (147 lb), SpO2 98 %.        Neurologic Exam     Mental Status   Oriented to person, place, and time.   Attention: normal. Concentration: normal.   Speech: speech is normal   Level of consciousness: alert    Bright and awake.  Oriented x3.  Follows commands without prompting.     Cranial Nerves     CN II   Visual fields full to confrontation.     CN III, IV, VI   Pupils are equal, round, and reactive to light.  Extraocular motions are normal.     CN V   Facial sensation intact.     CN VII   Facial expression full, symmetric.     CN VIII   CN VIII normal.     CN IX, X   CN IX normal.     CN XI   CN XI normal.     Motor Exam   Muscle bulk: normal  Overall muscle tone: normal  Right arm tone: normal  Left arm tone: normal  Right arm pronator drift: absent  Left arm pronator drift: absent  Right leg tone: normal  Left leg tone: " normal    Strength   Right deltoid: 4/5  Left deltoid: 4/5  Right biceps: 4/5  Left biceps: 4/5  Right triceps: 4/5  Left triceps: 4/5  Left wrist flexion: 4/5  Right wrist extension: 4/5  Left wrist extension: 4/5  Right iliopsoas: 4/5  Left iliopsoas: 4/5  Right quadriceps: 4/5  Left quadriceps: 4/5  Right anterior tibial: 4/5  Left anterior tibial: 4/5  Right posterior tibial: 4/5  Left posterior tibial: 4/5  Moves all extremities     Sensory Exam   Right arm light touch: normal  Left arm light touch: normal  Right leg light touch: decreased from knee  Left leg light touch: decreased from knee    Gait, Coordination, and Reflexes     Tremor   Resting tremor: absent  Intention tremor: absent  Action tremor: absent    Drains: * No LDAs found *    Imaging Results (Last 24 Hours)     Procedure Component Value Units Date/Time    CT Lumbar Spine Without Contrast [[id number]] Collected:  08/20/20 1119     Updated:  08/20/20 1128    Narrative:       EXAMINATION: CT LUMBAR SPINE WO CONTRAST- 8/20/2020 11:19 AM CDT     HISTORY: Low back pain after fall     COMPARISON: Nuclear medicine PET CT exam 9/24/2018, CT abdomen and  pelvis with contrast 6/24/2017     DOSE: 433 mGy-cm     TECHNIQUE: Sequential imaging was performed through the lumbar spine  without the use of IV contrast.  Sagittal and coronal reformations were  made from the original source data and reviewed. Automated exposure  control was also utilized to decrease patient radiation dose.     FINDINGS:   Alignment of the lumbar spine appears normal. An old L1 compression  deformity is evident. When compared with the exam from 2017, there is a  new superior endplate deformity of L3 which results in 40% loss of  vertebral body height. This appears to involve the anterior and middle  vertebral columns with extension into the posterior margin of the  vertebral body. There is no evidence of perched facet. The spinous  processes appear intact. The patient appears  osteopenic. There is  curvature at the thoracolumbar junction, which may be positional.  Multilevel facet hypertrophy is present. Degenerative changes are noted  in the SI joints.     Multilevel degenerative disc disease is evident. There is bilateral  neuroforaminal narrowing at L4-L5. There is right greater than left  neural foraminal narrowing at L5-S1. There is diffuse disc bulge at  L5-S1. No critical spinal canal stenosis is identified.     Review of the visualized paraspinal soft tissues demonstrates a low  density lesion extending from the right kidney which is incompletely  characterized on this exam but present on the previous PET CT exam and  likely a cyst. Atherosclerotic calcifications are seen in the aorta and  its branch vessels.        Impression:       1. When compared with the most recent exam, there is a new superior  endplate deformity of the L3 vertebral body involving the anterior and  middle columns. This is considered a possibly unstable fracture.     2. Stable compression deformity at L1.     Findings were called to Dr. Mancuso in the emergency department at 11:24  AM on 8/20/2020.     This report was finalized on 08/20/2020 11:25 by Dr. Neal Leon MD.    CT Thoracic Spine Without Contrast [[id number]] Collected:  08/20/20 1114     Updated:  08/20/20 1124    Narrative:       EXAM: CT THORACIC SPINE WO CONTRAST- - 8/20/2020 10:46 AM CDT     HISTORY: T/L-spine trauma, minor-mod, low back pain       COMPARISON: 6/24/2017.      DOSE LENGTH PRODUCT: 481 mGy cm. Automated exposure control was also  utilized to decrease patient radiation dose.     TECHNIQUE: Unenhanced CT of the thoracic spine with multiplanar  reformats.     FINDINGS:  T1 outside the field-of-view. T2 to mid L2 visualized.      Similar chronic height loss of L1.     Minimal central age-indeterminate height loss of T4. Remaining vertebral  body heights within normal limits. Mild diffuse disc height loss.  Posterior elements appear  intact.     Subacute appearing fractures of posterior right 10th and 11th ribs on  axial series 4, image 63 and 72.     Limited evaluation of the spinal cord due to CT technique.     Calcified aortic atherosclerosis. Descending thoracic aorta measures 3.3  cm at the diaphragm, enlarged.     Multiple pulmonary nodules. Comparison to prior PET/CT 9/24/2018 is  limited due to technique, however nodules appear increased in size.  Specifically, left lower lobe with a 1.9 cm pulmonary nodule on axial  series 4, image 56, previously 1.3 cm on 9/24/2018.          Impression:       1. Minimal age indeterminate central height loss of T4. Correlate with  patient's symptoms.  2. Subacute appearing fractures of the posterior right 10th and 11th  ribs.  3. Multiple pulmonary nodules, which appear increased compared to PET/CT  9/24/2018, concerning for progression of metastatic disease. Consider  comparison with interval outside imaging.  4. Descending thoracic aorta enlarged measuring 3.3 cm at the level of  the diaphragm.  This report was finalized on 08/20/2020 11:21 by Dr Keely Mandel MD.    CT Cervical Spine Without Contrast [[id number]] Collected:  08/20/20 1114     Updated:  08/20/20 1121    Narrative:       EXAMINATION: CT CERVICAL SPINE WO CONTRAST- 8/20/2020 11:14 AM CDT     HISTORY: Neck pain after fall     COMPARISON: None     DOSE: 277 mGy-cm     TECHNIQUE: Sequential imaging was performed through the cervical spine  without the use of IV contrast.  Sagittal and coronal reformations were  made from the original source data and reviewed. Automated exposure  control was also utilized to decrease patient radiation dose.     FINDINGS:   The patient is diffusely osteopenic. Alignment of the cervical spine  appears normal. Vertebral body heights appear well maintained. There is  no evidence of perched facet. The spinous processes appear intact.  Alignment of the odontoid with lateral masses of C1 appears grossly  normal.  The occipital condyles appear intact. 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 There is no evidence of acute  fracture. Multilevel facet hypertrophy and degenerative neuroforaminal  narrowing is identified. No critical spinal canal stenosis is  identified. No prevertebral edema is appreciated.     The visualized lung apices appear clear. No acute soft tissue  abnormalities are identified.           Impression:       1. No evidence of acute osseous injury in the cervical spine.     This report was finalized on 08/20/2020 11:18 by Dr. Neal Leon MD.    CT Head Without Contrast [[id number]] Collected:  08/20/20 1112     Updated:  08/20/20 1117    Narrative:       EXAM: CT HEAD WO CONTRAST- - 8/20/2020 10:46 AM CDT     HISTORY: Head trauma, headache       COMPARISON: No existing relevant imaging studies available.      DOSE LENGTH PRODUCT: 684 mGy cm. Automated exposure control was also  utilized to decrease patient radiation dose.     TECHNIQUE: Unenhanced CT images obtained from vertex to skull base with  multiplanar reformats.     FINDINGS:  No acute intracranial hemorrhage, midline shift, mass effect or large  territory cortical infarct. Ventricles, sulci and basilar cisterns  proportionally prominent suggesting volume loss. Patchy periventricular  and subcortical white matter hypodensity, most commonly due to chronic  microvascular changes. Vascular calcifications present.     Globes, retrobulbar soft tissues, paranasal sinuses, mastoid air cells  and external auditory canals are within normal limits. Calvarium appears  intact. Subcutaneous tissues within normal limits.     Patchy mineralization of the calvarium.          Impression:       1. No acute intracranial findings.   2. Moderate chronic microvascular changes and volume loss.  3. Patchy mineralization of the calvarium, could be seen with  osteopenia, but infiltrative process such as multiple myeloma not  excluded. Recommend correlation  with patient history.  This report was finalized on 08/20/2020 11:14 by Dr Keely Mandel MD.        Lab Results (last 24 hours)     Procedure Component Value Units Date/Time    Comprehensive Metabolic Panel [[id number]]  (Abnormal) Collected:  08/21/20 0336    Specimen:  Blood Updated:  08/21/20 0434     Glucose 63 mg/dL      BUN 26 mg/dL      Creatinine 0.69 mg/dL      Sodium 143 mmol/L      Potassium 3.8 mmol/L      Chloride 101 mmol/L      CO2 27.0 mmol/L      Calcium 8.6 mg/dL      Total Protein 5.3 g/dL      Albumin 2.20 g/dL      ALT (SGPT) 18 U/L      AST (SGOT) 55 U/L      Alkaline Phosphatase 234 U/L      Total Bilirubin 1.0 mg/dL      eGFR Non African Amer 111 mL/min/1.73      Globulin 3.1 gm/dL      A/G Ratio 0.7 g/dL      BUN/Creatinine Ratio 37.7     Anion Gap 15.0 mmol/L     Narrative:       GFR Normal >60  Chronic Kidney Disease <60  Kidney Failure <15      TSH [[id number]]  (Normal) Collected:  08/21/20 0336    Specimen:  Blood Updated:  08/21/20 0434     TSH 2.220 uIU/mL     CBC Auto Differential [[id number]]  (Abnormal) Collected:  08/21/20 0336    Specimen:  Blood Updated:  08/21/20 0401     WBC 7.31 10*3/mm3      RBC 3.89 10*6/mm3      Hemoglobin 10.6 g/dL      Hematocrit 34.1 %      MCV 87.7 fL      MCH 27.2 pg      MCHC 31.1 g/dL      RDW 19.0 %      RDW-SD 60.7 fl      MPV 10.2 fL      Platelets 107 10*3/mm3      Neutrophil % 77.1 %      Lymphocyte % 12.7 %      Monocyte % 9.4 %      Eosinophil % 0.3 %      Basophil % 0.1 %      Immature Grans % 0.4 %      Neutrophils, Absolute 5.63 10*3/mm3      Lymphocytes, Absolute 0.93 10*3/mm3      Monocytes, Absolute 0.69 10*3/mm3      Eosinophils, Absolute 0.02 10*3/mm3      Basophils, Absolute 0.01 10*3/mm3      Immature Grans, Absolute 0.03 10*3/mm3      nRBC 0.0 /100 WBC     COVID PRE-OP / PRE-PROCEDURE SCREENING ORDER (NO ISOLATION) - Swab, Nasopharynx [[id number]] Collected:  08/20/20 1707    Specimen:  Swab from Nasopharynx Updated:  08/20/20 6521     Narrative:       The following orders were created for panel order COVID PRE-OP / PRE-PROCEDURE SCREENING ORDER (NO ISOLATION) - Swab, Nasopharynx.  Procedure                               Abnormality         Status                     ---------                               -----------         ------                     COVID-19, [facility] IN-HOUS...[[id number]]  Normal              Final result                 Please view results for these tests on the individual orders.    COVID-19, [facility] IN-HOUSE,NP Swab (NO TRANSPORT MEDIA) 2 HR TAT - Swab, Nasopharynx [[id number]]  (Normal) Collected:  08/20/20 1701    Specimen:  Swab from Nasopharynx Updated:  08/20/20 1750     COVID19 Not Detected    Narrative:       Fact sheet for providers: https://www.fda.gov/media/147268/download     Fact sheet for patients: https://www.fda.gov/media/190865/download    Comprehensive Metabolic Panel [[id number]]  (Abnormal) Collected:  08/20/20 1037    Specimen:  Blood from Arm, Right Updated:  08/20/20 1108     Glucose 71 mg/dL      BUN 27 mg/dL      Creatinine 0.90 mg/dL      Sodium 141 mmol/L      Potassium 3.9 mmol/L      Chloride 98 mmol/L      CO2 22.0 mmol/L      Calcium 8.9 mg/dL      Total Protein 5.9 g/dL      Albumin 2.50 g/dL      ALT (SGPT) 21 U/L      AST (SGOT) 66 U/L      Alkaline Phosphatase 285 U/L      Total Bilirubin 1.3 mg/dL      eGFR Non African Amer 82 mL/min/1.73      Globulin 3.4 gm/dL      A/G Ratio 0.7 g/dL      BUN/Creatinine Ratio 30.0     Anion Gap 21.0 mmol/L     Narrative:       GFR Normal >60  Chronic Kidney Disease <60  Kidney Failure <15      CBC & Differential [[id number]] Collected:  08/20/20 1037    Specimen:  Blood from Arm, Right Updated:  08/20/20 1051    Narrative:       The following orders were created for panel order CBC & Differential.  Procedure                               Abnormality         Status                     ---------                               -----------         ------                      CBC Auto Differential[[id number]]        Abnormal            Final result                 Please view results for these tests on the individual orders.    CBC Auto Differential [[id number]]  (Abnormal) Collected:  08/20/20 1037    Specimen:  Blood from Arm, Right Updated:  08/20/20 1051     WBC 8.57 10*3/mm3      RBC 4.11 10*6/mm3      Hemoglobin 11.2 g/dL      Hematocrit 35.5 %      MCV 86.4 fL      MCH 27.3 pg      MCHC 31.5 g/dL      RDW 18.8 %      RDW-SD 58.5 fl      MPV 9.4 fL      Platelets 123 10*3/mm3      Neutrophil % 81.2 %      Lymphocyte % 9.5 %      Monocyte % 8.9 %      Eosinophil % 0.0 %      Basophil % 0.2 %      Immature Grans % 0.2 %      Neutrophils, Absolute 6.96 10*3/mm3      Lymphocytes, Absolute 0.81 10*3/mm3      Monocytes, Absolute 0.76 10*3/mm3      Eosinophils, Absolute 0.00 10*3/mm3      Basophils, Absolute 0.02 10*3/mm3      Immature Grans, Absolute 0.02 10*3/mm3      nRBC 0.0 /100 WBC         [id number]  Byron Hall, APRN

## 2020-08-23 NOTE — THERAPY DISCHARGE NOTE
Acute Care - Speech Language Pathology Discharge Summary  [facility]       Patient Name: Jonas Snow  : 1940  MRN: [id number]    Today's Date: 2020                   Admit Date: 2020      SLP Recommendation and Plan  Pureed diet with thin liquids  Brandyn Dutta, MS CCC-SLP 2020 15:44    Visit Dx:    ICD-10-CM ICD-9-CM   1. 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 Closed fracture of third lumbar vertebra, unspecified fracture morphology, initial encounter (CMS/[facility]) S32.039A 805.4   3. Fall, initial encounter W19.XXXA E888.9   4. Closed fracture of fourth thoracic vertebra, unspecified fracture morphology, initial encounter (CMS/[facility]) S22.049A 805.2   5. Dysphagia, unspecified type R13.10 787.20   6. Hiatal hernia K44.9 553.3   7. Schatzki's ring K22.2 750.3   8. Malignant neoplasm of colon, unspecified part of colon (CMS/HCC) C18.9 153.9   9. Other dysphagia R13.19 787.29   10. Gastroesophageal reflux disease without esophagitis K21.9 530.81   11. Malignant neoplasm of liver, unspecified liver malignancy type (CMS/[facility]) C22.9 155.2   12. Weight loss R63.4 783.21   13. Protein-calorie malnutrition, unspecified severity (CMS/[facility]) E46 263.9   14. Closed fracture of third lumbar vertebra, unspecified fracture morphology, sequela S32.039S 905.1   15. Closed fracture of multiple ribs of right side, sequela S22.41XS 905.1   16. Closed compression fracture of body of L1 vertebra (CMS/[facility]) S32.010A 805.4   17. Polyp of colon, unspecified part of colon, unspecified type K63.5 211.3   18. Esophageal stricture K22.2 530.3   19. Nausea and vomiting, intractability of vomiting not specified, unspecified vomiting type R11.2 787.01   20. Chemotherapy induced nausea and vomiting R11.2 787.01    T45.1X5A E933.1   21. Infestation by bed bug B88.8 134.8   22. Anemia due to chemotherapy D64.81 285.3    T45.1X5A E933.1   23. Gastrointestinal hemorrhage with melena K92.1  578.1   24. History of esophagitis Z87.19 V12.79   25. History of esophageal stricture Z87.19 V12.79   26. Non-intractable vomiting without nausea, unspecified vomiting type R11.11 787.03               SLP GOALS     Row Name 08/23/20 1500 08/21/20 0818          Oral Nutrition/Hydration Goal 1 (SLP)    Oral Nutrition/Hydration Goal 1, SLP  --  Pt will tolerate least restrictive diet with no overt s/s of aspiration.   -MB     Time Frame (Oral Nutrition/Hydration Goal 1, SLP)  --  by discharge  -MB     Barriers (Oral Nutrition/Hydration Goal 1, SLP)  --  n/a  -MB     Progress/Outcomes (Oral Nutrition/Hydration Goal 1, SLP)  goal not met;discharged from facility  -CS  goal ongoing  -MB       User Key  (r) = Recorded By, (t) = Taken By, (c) = Cosigned By    Initials Name Provider Type    Mahnaz Thomas CCC-SLP Speech and Language Pathologist    Brandyn Page MS CCC-SLP Speech and Language Pathologist                  SLP Discharge Summary  Anticipated Dischage Disposition (SLP): home, other (see comments)  Reason for Discharge: discharge from this facility  Progress Toward Achieving Short/long Term Goals: goals not met within established timelines  Discharge Destination: other (see comments)(Home with hospice)      Brandyn Dutta, MS CCC-SLP  8/23/2020

## 2020-08-24 NOTE — PAYOR COMM NOTE
"Tyler Ibarra (80 y.o. Male)    [id number]    Clinical pt admit 8/20  ER inpt  Admit  D/c 8/21 see d/c summary  [facility]   fritz phone      [phone]      Date of Birth Social Security Number Address Home Phone MRN    [id number]  [id number] [phone] [id number]    Adventism Marital Status          Congregational        Admission Date Admission Type Admitting Provider Attending Provider Department, Room/Bed    8/20/20 Emergency Jaspreet Calderon MD  [facility] 3C, 361/1    Discharge Date Discharge Disposition Discharge Destination        8/21/2020 Hospice/Medical Facility (DC - External)              Attending Provider:  (none)   Allergies:  No Known Allergies    Isolation:  None   Infection:  None   Code Status:  Prior    Ht:  182.9 cm (72\")   Wt:  66.7 kg (147 lb)    Admission Cmt:  None   Principal Problem:  None                Active Insurance as of 8/20/2020     Primary Coverage     Payor Plan Insurance Group Employer/Plan Group    [facility] DEPT [id number]      Payor Plan Address Payor Plan Phone Number Payor Plan Fax Number Effective Dates    [facility] OFFICE OF COMMUNITY CARE [phone]  1/1/2019 - None Entered    [address] [address]       [facility] [address]       Subscriber Name Subscriber Birth Date Member ID       TYLER IBARRA 1940 [id number]                 Emergency Contacts      (Rel.) Home Phone Work Phone Mobile Phone    Tyler Ibarra Jr (Son) [phone] -- --    Fahad Ibarra (Grandchild) [phone] -- --               History & Physical      Jaspreet Calderon MD at 08/20/20 1700              [facility] Medicine Services  HISTORY AND PHYSICAL    Date of Admission: 8/20/2020  Primary Care Physician: Samia Vargas MD    Subjective     Chief Complaint: Colon cancer with lung mets-worsening, L3 possible unstable fracture, L1 compression fracture, right rib " fracture-10-11 ribs    History of Present Illness  Patient is a 80-year-old  male presented ER for frequent fall.  Patient also complained of back pain.  History of colon cancer with mets to the liver.  Patient has been falling at home.  Patient is mostly bedbound at home.  CT scan shows L3 fracture possible unstable, compression fracture of L1, rib fracture right rib 10th-11 ribs.  Patient denies any loss of consciousness.  Patient did hit his head on the ground this morning.  Did complain of mild headaches and back pain from falling.  Patient is on chronic pain medication due to colon cancer, patient does not take his pain medication at home as directed.  Family wants patient in hospice.    Review of Systems   Unable to obtain due to altered mental status.  Patient denies any chest pain.  Patient no acute distress.  Otherwise as stated above.    Otherwise complete ROS reviewed and negative except as mentioned in the HPI.      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)    • Secondary malignant neoplasm of unspecified lung (CMS/HCC)    • Type 2 diabetes mellitus (CMS/HCC)    • Vitamin D deficiency        Past Surgical History:  Past Surgical History:   Procedure Laterality Date   • COLONOSCOPY N/A 1/6/2017    Diverticulosis; Malignant tumor in ascending colon-biopsied; One 20mm polyp in transverse colon-tattooed; Five 5-12mm polyps in transverse colon; One 7mm polyp at splenic flexure; Two 6-10mm polyps in descending colon; Two 5-8mm polyps in sigmoid colon; One 20mm polyp in the sigmoid colon-Clips placed; One 8mm polyp in rectum; Non-bleeding internal  hemorrhoids; Repeat 1 year; See path report   • ENDOSCOPY  12/19/2014    HH; Non-obstructing Schatzki ring-dilated; Normal stomach; Normal examined duodenum   • ENDOSCOPY N/A 10/5/2017    LA Grade D esophagitis-biopsied; Esophageal stenosis-dilated; Small HH; Normal stomach; Normal examined duodenum; Repeat 1 month for retreatment   • ENDOSCOPY N/A 11/2/2017    Small HH; Esophageal stenosis-biopsied; Normal stomach; Normal examined duodenum   • ENDOSCOPY N/A 12/11/2017    Medium-sized HH; Low-grade of narrowing and non-obstructing Schatzki ring-dilated; Normal stomach; Normal examined duodenum; No specimens collected; Repeat 4-6 weeks for retreatment   • ENDOSCOPY N/A 1/19/2018    Medium-sized HH; Non-obstructing Schatzki ring-dilated; Normal stomach; Normal examined duodenum; No specimens collected   • ENDOSCOPY N/A 8/31/2018    Benign-appearing esophageal stenosis-dilated; Small HH; Normal stomach; Normal examined duodenum; No specimens collected; Repeat 2-4 for retreatment   • ENDOSCOPY N/A 9/28/2018    Medium-sized HH; LA Grade D reflux esophagitis-dilated; Normal stomach; Normal examined duodenum; No specimens collected; Repeat 4-6 weeks   • ENDOSCOPY N/A 10/31/2018    Benign-appearing esophageal stenosis-dilated; Medium-sized HH; Normal stomach; Normal examined duodenum; No specimens collected   • ENDOSCOPY  11/18/2014    HH; Benign-appearing esophageal stricture-dilated; Normal stomach; Normal examined duodenum; Repeat 4 weeks for retreatment   • ENDOSCOPY N/A 10/17/2019    Benign-appearing esophageal stenosis-dilated; Small HH; Normal stomach; Normal examined duodenum; No specimens collected; Repeat 3 weeks   • ENDOSCOPY N/A 11/7/2019    Small HH; Benign-appearing esophageal stenosis-dilated; Normal stomach; Normal examined duodenum; No specimens collected; Repeat 3 weeks   • ENDOSCOPY N/A 12/2/2019    Benign-appearing esophageal stenosis-dilated; Medium-sized HH; Normal stomach; Normal examined duodenum; No  "specimens collected; Repeat 3 weeks   • ENDOSCOPY N/A 12/23/2019    Small HH; LA Grade D reflux esophagitis; Benign-appearing esophageal stenosis-dilated; Normal stomach; Normal examined duodenum; No specimens collected   • TONSILLECTOMY     • VENOUS ACCESS DEVICE (PORT) INSERTION         Family History: family history includes Aneurysm in his father; No Known Problems in his mother.    Social History:  reports that he quit smoking about 11 years ago. His smoking use included cigarettes. He has never used smokeless tobacco. He reports that he drinks alcohol. He reports that he does not use drugs.    Medications:  Prior to Admission medications    Medication Sig Start Date End Date Taking? Authorizing Provider   finasteride (PROSCAR) 5 MG tablet Take 5 mg by mouth Daily.    Provider, MD Yves   pantoprazole (PROTONIX) 40 MG EC tablet Take 1 tablet by mouth 2 (Two) Times a Day Before Meals. 10/5/17   Haydee Krishnamurthy MD   sucralfate (CARAFATE) 1 g tablet Take 1 tablet by mouth 4 (Four) Times a Day. 12/23/19   Haydee Krishnamurthy MD   sucralfate (CARAFATE) 1 GM/10ML suspension Take 10 mL by mouth 4 (Four) Times a Day. 12/23/19   Haydee Krishnamurthy MD   tamsulosin (FLOMAX) 0.4 MG capsule 24 hr capsule Take 1 capsule by mouth Every Night.    Provider, MD Yves     Allergies:  No Known Allergies    Objective     Vital Signs: /78   Pulse 93   Temp 97.7 °F (36.5 °C) (Oral)   Resp 26   Ht 182.9 cm (72\")   Wt 66.7 kg (147 lb)   SpO2 95%   BMI 19.94 kg/m²    Physical Exam   Constitutional: He appears well-developed.   Cachectic.   HENT:   Head: Normocephalic.   Eyes: Pupils are equal, round, and reactive to light. Conjunctivae are normal.   Neck: Neck supple. No JVD present.   Cardiovascular: Normal rate, regular rhythm, normal heart sounds and intact distal pulses. Exam reveals no gallop and no friction rub.   No murmur heard.  Pulmonary/Chest: No respiratory distress. He has no wheezes. He has no rales. He " exhibits no tenderness.   Diminished breath sound bilateral, clear.   Abdominal: Soft. Bowel sounds are normal. He exhibits no distension. There is no tenderness. There is no rebound and no guarding.   Musculoskeletal: He exhibits no edema, tenderness or deformity.   Patient is curled up in fetal position.   Neurological: He displays normal reflexes. No cranial nerve deficit. He exhibits abnormal muscle tone. Coordination abnormal.   Skin: Skin is warm and dry. Capillary refill takes 2 to 3 seconds. No rash noted.   Psychiatric: He has a normal mood and affect.   Nursing note and vitals reviewed.          Results Reviewed:    Lab Results (last 24 hours)     Procedure Component Value Units Date/Time    COVID PRE-OP / PRE-PROCEDURE SCREENING ORDER (NO ISOLATION) - Swab, Nasopharynx [[id number]] Collected:  08/20/20 1701    Specimen:  Swab from Nasopharynx Updated:  08/20/20 1721    Narrative:       The following orders were created for panel order COVID PRE-OP / PRE-PROCEDURE SCREENING ORDER (NO ISOLATION) - Swab, Nasopharynx.  Procedure                               Abnormality         Status                     ---------                               -----------         ------                     COVID-19, ABBOTT IN-HOUS...[[id number]]                      In process                   Please view results for these tests on the individual orders.    COVID-19, ABBOTT IN-HOUSE,NP Swab (NO TRANSPORT MEDIA) 2 HR TAT - Swab, Nasopharynx [[id number]] Collected:  08/20/20 1701    Specimen:  Swab from Nasopharynx Updated:  08/20/20 1721    Comprehensive Metabolic Panel [[id number]]  (Abnormal) Collected:  08/20/20 1037    Specimen:  Blood from Arm, Right Updated:  08/20/20 1108     Glucose 71 mg/dL      BUN 27 mg/dL      Creatinine 0.90 mg/dL      Sodium 141 mmol/L      Potassium 3.9 mmol/L      Chloride 98 mmol/L      CO2 22.0 mmol/L      Calcium 8.9 mg/dL      Total Protein 5.9 g/dL      Albumin 2.50 g/dL      ALT (SGPT) 21  U/L      AST (SGOT) 66 U/L      Alkaline Phosphatase 285 U/L      Total Bilirubin 1.3 mg/dL      eGFR Non African Amer 82 mL/min/1.73      Globulin 3.4 gm/dL      A/G Ratio 0.7 g/dL      BUN/Creatinine Ratio 30.0     Anion Gap 21.0 mmol/L     Narrative:       GFR Normal >60  Chronic Kidney Disease <60  Kidney Failure <15      CBC & Differential [[id number]] Collected:  08/20/20 1037    Specimen:  Blood from Arm, Right Updated:  08/20/20 1051    Narrative:       The following orders were created for panel order CBC & Differential.  Procedure                               Abnormality         Status                     ---------                               -----------         ------                     CBC Auto Differential[[id number]]        Abnormal            Final result                 Please view results for these tests on the individual orders.    CBC Auto Differential [[id number]]  (Abnormal) Collected:  08/20/20 1037    Specimen:  Blood from Arm, Right Updated:  08/20/20 1051     WBC 8.57 10*3/mm3      RBC 4.11 10*6/mm3      Hemoglobin 11.2 g/dL      Hematocrit 35.5 %      MCV 86.4 fL      MCH 27.3 pg      MCHC 31.5 g/dL      RDW 18.8 %      RDW-SD 58.5 fl      MPV 9.4 fL      Platelets 123 10*3/mm3      Neutrophil % 81.2 %      Lymphocyte % 9.5 %      Monocyte % 8.9 %      Eosinophil % 0.0 %      Basophil % 0.2 %      Immature Grans % 0.2 %      Neutrophils, Absolute 6.96 10*3/mm3      Lymphocytes, Absolute 0.81 10*3/mm3      Monocytes, Absolute 0.76 10*3/mm3      Eosinophils, Absolute 0.00 10*3/mm3      Basophils, Absolute 0.02 10*3/mm3      Immature Grans, Absolute 0.02 10*3/mm3      nRBC 0.0 /100 WBC            Radiology Data:    Imaging Results (Last 24 Hours)     Procedure Component Value Units Date/Time    CT Lumbar Spine Without Contrast [[id number]] Collected:  08/20/20 1119     Updated:  08/20/20 1128    Narrative:       EXAMINATION: CT LUMBAR SPINE WO CONTRAST- 8/20/2020 11:19 AM CDT     HISTORY: Low  back pain after fall     COMPARISON: Nuclear medicine PET CT exam 9/24/2018, CT abdomen and  pelvis with contrast 6/24/2017     DOSE: 433 mGy-cm     TECHNIQUE: Sequential imaging was performed through the lumbar spine  without the use of IV contrast.  Sagittal and coronal reformations were  made from the original source data and reviewed. Automated exposure  control was also utilized to decrease patient radiation dose.     FINDINGS:   Alignment of the lumbar spine appears normal. An old L1 compression  deformity is evident. When compared with the exam from 2017, there is a  new superior endplate deformity of L3 which results in 40% loss of  vertebral body height. This appears to involve the anterior and middle  vertebral columns with extension into the posterior margin of the  vertebral body. There is no evidence of perched facet. The spinous  processes appear intact. The patient appears osteopenic. There is  curvature at the thoracolumbar junction, which may be positional.  Multilevel facet hypertrophy is present. Degenerative changes are noted  in the SI joints.     Multilevel degenerative disc disease is evident. There is bilateral  neuroforaminal narrowing at L4-L5. There is right greater than left  neural foraminal narrowing at L5-S1. There is diffuse disc bulge at  L5-S1. No critical spinal canal stenosis is identified.     Review of the visualized paraspinal soft tissues demonstrates a low  density lesion extending from the right kidney which is incompletely  characterized on this exam but present on the previous PET CT exam and  likely a cyst. Atherosclerotic calcifications are seen in the aorta and  its branch vessels.        Impression:       1. When compared with the most recent exam, there is a new superior  endplate deformity of the L3 vertebral body involving the anterior and  middle columns. This is considered a possibly unstable fracture.     2. Stable compression deformity at L1.     Findings were  called to Dr. Mancuso in the emergency department at 11:24  AM on 8/20/2020.     This report was finalized on 08/20/2020 11:25 by Dr. Neal Leon MD.    CT Thoracic Spine Without Contrast [[id number]] Collected:  08/20/20 1114     Updated:  08/20/20 1124    Narrative:       EXAM: CT THORACIC SPINE WO CONTRAST- - 8/20/2020 10:46 AM CDT     HISTORY: T/L-spine trauma, minor-mod, low back pain       COMPARISON: 6/24/2017.      DOSE LENGTH PRODUCT: 481 mGy cm. Automated exposure control was also  utilized to decrease patient radiation dose.     TECHNIQUE: Unenhanced CT of the thoracic spine with multiplanar  reformats.     FINDINGS:  T1 outside the field-of-view. T2 to mid L2 visualized.      Similar chronic height loss of L1.     Minimal central age-indeterminate height loss of T4. Remaining vertebral  body heights within normal limits. Mild diffuse disc height loss.  Posterior elements appear intact.     Subacute appearing fractures of posterior right 10th and 11th ribs on  axial series 4, image 63 and 72.     Limited evaluation of the spinal cord due to CT technique.     Calcified aortic atherosclerosis. Descending thoracic aorta measures 3.3  cm at the diaphragm, enlarged.     Multiple pulmonary nodules. Comparison to prior PET/CT 9/24/2018 is  limited due to technique, however nodules appear increased in size.  Specifically, left lower lobe with a 1.9 cm pulmonary nodule on axial  series 4, image 56, previously 1.3 cm on 9/24/2018.          Impression:       1. Minimal age indeterminate central height loss of T4. Correlate with  patient's symptoms.  2. Subacute appearing fractures of the posterior right 10th and 11th  ribs.  3. Multiple pulmonary nodules, which appear increased compared to PET/CT  9/24/2018, concerning for progression of metastatic disease. Consider  comparison with interval outside imaging.  4. Descending thoracic aorta enlarged measuring 3.3 cm at the level of  the diaphragm.  This report was  finalized on 08/20/2020 11:21 by Dr Keely Mandel MD.    CT Cervical Spine Without Contrast [[id number]] Collected:  08/20/20 1114     Updated:  08/20/20 1121    Narrative:       EXAMINATION: CT CERVICAL SPINE WO CONTRAST- 8/20/2020 11:14 AM CDT     HISTORY: Neck pain after fall     COMPARISON: None     DOSE: 277 mGy-cm     TECHNIQUE: Sequential imaging was performed through the cervical spine  without the use of IV contrast.  Sagittal and coronal reformations were  made from the original source data and reviewed. Automated exposure  control was also utilized to decrease patient radiation dose.     FINDINGS:   The patient is diffusely osteopenic. Alignment of the cervical spine  appears normal. Vertebral body heights appear well maintained. There is  no evidence of perched facet. The spinous processes appear intact.  Alignment of the odontoid with lateral masses of C1 appears grossly  normal. The occipital condyles appear intact. 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 There is no evidence of acute  fracture. Multilevel facet hypertrophy and degenerative neuroforaminal  narrowing is identified. No critical spinal canal stenosis is  identified. No prevertebral edema is appreciated.     The visualized lung apices appear clear. No acute soft tissue  abnormalities are identified.           Impression:       1. No evidence of acute osseous injury in the cervical spine.     This report was finalized on 08/20/2020 11:18 by Dr. Neal Leon MD.    CT Head Without Contrast [[id number]] Collected:  08/20/20 1112     Updated:  08/20/20 1117    Narrative:       EXAM: CT HEAD WO CONTRAST- - 8/20/2020 10:46 AM CDT     HISTORY: Head trauma, headache       COMPARISON: No existing relevant imaging studies available.      DOSE LENGTH PRODUCT: 684 mGy cm. Automated exposure control was also  utilized to decrease patient radiation dose.     TECHNIQUE: Unenhanced CT images obtained from vertex to skull base  with  multiplanar reformats.     FINDINGS:  No acute intracranial hemorrhage, midline shift, mass effect or large  territory cortical infarct. Ventricles, sulci and basilar cisterns  proportionally prominent suggesting volume loss. Patchy periventricular  and subcortical white matter hypodensity, most commonly due to chronic  microvascular changes. Vascular calcifications present.     Globes, retrobulbar soft tissues, paranasal sinuses, mastoid air cells  and external auditory canals are within normal limits. Calvarium appears  intact. Subcutaneous tissues within normal limits.     Patchy mineralization of the calvarium.          Impression:       1. No acute intracranial findings.   2. Moderate chronic microvascular changes and volume loss.  3. Patchy mineralization of the calvarium, could be seen with  osteopenia, but infiltrative process such as multiple myeloma not  excluded. Recommend correlation with patient history.  This report was finalized on 08/20/2020 11:14 by Dr Keely Mandel MD.          I have personally reviewed and interpreted the radiology studies and ECG obtained at time of admission.     Assessment / Plan      Assessment & Plan  Active Hospital Problems    Diagnosis   • Carcinoma of colon metastatic to lung (CMS/HCC)   • L3 vertebral fracture (CMS/HCC)   • Rib fractures   • Closed compression fracture of body of L1 vertebra (CMS/HCC)   • Protein calorie malnutrition (CMS/HCC)   • Weight loss   • Other dysphagia   • Gastroesophageal reflux disease without esophagitis   • Liver cancer (CMS/HCC)   • Colon cancer (CMS/HCC)   • Schatzki's ring     Dilated up to 12 mm December 2017     • Hiatal hernia     Plans    Colon cancer with progressive mets to the lungs.  Plan for hospice.    L3 fracture/falling/L1 compression fracture/height loss T4, subacute posterior rib fracture right 10th and 11th ribs/.  Morphine PRN.  Percocet PRN.  Cervical spine-No evidence of acute osseous injury in the cervical  spine.  CT scan of the head-No acute intracranial findings, Moderate chronic microvascular changes and volume loss,  Patchy mineralization of the calvarium- could be seen with  Osteopenia- but infiltrative process such as multiple myeloma not excluded.   CT scan L-spine - new superior endplate deformity of the L3 vertebral body involving the anterior and middle columns- this is considered a possibly unstable fracture, stable compression fracture of L1.  CT scan of T-spine-Minimal age indeterminate central height loss of T4, subacute appearing fractures of the posterior right 10th and 11th ribs, multiple pulmonary nodules, which appear increased compared to PET/CT 9/24/2018, concerning for progression of metastatic disease, descending thoracic aorta enlargement measuring 3.3 cm at the level of diaphragm.  .  Dehydration.  IV fluids.    Nausea/vomiting/reflux/dysphasia history of Schatzki's ring/history of multiple esophageal dilatation.  Speech evaluate for feeding.  Pepcid IV.  Zofran PRN.    Anemia.  Will follow    Code Status: DNR.  DNI.  [facility]     I discussed the patient's findings and my recommendations with: Patient and son.    Estimated length of stay: 1 to 3 days.    Electronically signed by Jaspreet Calderon MD, 08/20/20, 5:00 PM.              Electronically signed by Jaspreet Calderon MD at 08/20/20 8121          Emergency Department Notes      Keely Mancuso MD at 08/20/20 0992          Subjective   Patient is a 80-year-old male who presents to the ER status post a fall.  Patient has a history of colon cancer and is pretty much bedbound at this point.  Patient states he accidentally slid out of bed this morning and hit his head on the ground.  He denies any loss of consciousness.  He does not take blood thinners.  Patient has had a mild headache since that time but denies any other pain or injury from the fall.  Patient does have chronic  pain from his colon cancer but family states he refuses pain medication.  They are trying to get him in hospice.  Patient denies any fever, chest pain, shortness of air, abdominal pain, nausea vomiting diarrhea, urinary changes, neuro logic changes, neck or back pain, extremity pain.          Review of Systems   Constitutional: Negative.    Eyes: Negative.    Respiratory: Negative.    Cardiovascular: Negative.    Gastrointestinal: Negative.    Endocrine: Negative.    Genitourinary: Negative.    Musculoskeletal: Negative.    Skin: Negative.    Allergic/Immunologic: Negative.    Neurological: Positive for headaches.   Hematological: Negative.    Psychiatric/Behavioral: Negative.    All other systems reviewed and are negative.      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)    • Secondary malignant neoplasm of unspecified lung (CMS/HCC)    • Type 2 diabetes mellitus (CMS/HCC)    • Vitamin D deficiency        No Known Allergies    Past Surgical History:   Procedure Laterality Date   • COLONOSCOPY N/A 1/6/2017    Diverticulosis; Malignant tumor in ascending colon-biopsied; One 20mm polyp in transverse colon-tattooed; Five 5-12mm polyps in transverse colon; One 7mm polyp at splenic flexure; Two 6-10mm polyps in descending colon; Two 5-8mm polyps in sigmoid colon; One 20mm polyp in the sigmoid colon-Clips placed; One 8mm polyp in rectum; Non-bleeding internal hemorrhoids; Repeat 1 year; See path report   • ENDOSCOPY  12/19/2014    HH; Non-obstructing Schatzki ring-dilated; Normal stomach; Normal examined duodenum   • ENDOSCOPY N/A 10/5/2017    LA Grade D esophagitis-biopsied; Esophageal  stenosis-dilated; Small HH; Normal stomach; Normal examined duodenum; Repeat 1 month for retreatment   • ENDOSCOPY N/A 11/2/2017    Small HH; Esophageal stenosis-biopsied; Normal stomach; Normal examined duodenum   • ENDOSCOPY N/A 12/11/2017    Medium-sized HH; Low-grade of narrowing and non-obstructing Schatzki ring-dilated; Normal stomach; Normal examined duodenum; No specimens collected; Repeat 4-6 weeks for retreatment   • ENDOSCOPY N/A 1/19/2018    Medium-sized HH; Non-obstructing Schatzki ring-dilated; Normal stomach; Normal examined duodenum; No specimens collected   • ENDOSCOPY N/A 8/31/2018    Benign-appearing esophageal stenosis-dilated; Small HH; Normal stomach; Normal examined duodenum; No specimens collected; Repeat 2-4 for retreatment   • ENDOSCOPY N/A 9/28/2018    Medium-sized HH; LA Grade D reflux esophagitis-dilated; Normal stomach; Normal examined duodenum; No specimens collected; Repeat 4-6 weeks   • ENDOSCOPY N/A 10/31/2018    Benign-appearing esophageal stenosis-dilated; Medium-sized HH; Normal stomach; Normal examined duodenum; No specimens collected   • ENDOSCOPY  11/18/2014    HH; Benign-appearing esophageal stricture-dilated; Normal stomach; Normal examined duodenum; Repeat 4 weeks for retreatment   • ENDOSCOPY N/A 10/17/2019    Benign-appearing esophageal stenosis-dilated; Small HH; Normal stomach; Normal examined duodenum; No specimens collected; Repeat 3 weeks   • ENDOSCOPY N/A 11/7/2019    Small HH; Benign-appearing esophageal stenosis-dilated; Normal stomach; Normal examined duodenum; No specimens collected; Repeat 3 weeks   • ENDOSCOPY N/A 12/2/2019    Benign-appearing esophageal stenosis-dilated; Medium-sized HH; Normal stomach; Normal examined duodenum; No specimens collected; Repeat 3 weeks   • ENDOSCOPY N/A 12/23/2019    Small HH; LA Grade D reflux esophagitis; Benign-appearing esophageal stenosis-dilated; Normal stomach; Normal examined duodenum; No specimens collected   •  TONSILLECTOMY     • VENOUS ACCESS DEVICE (PORT) INSERTION         Family History   Problem Relation Age of Onset   • Colon cancer Neg Hx    • Colon polyps Neg Hx    • Esophageal cancer Neg Hx    • Liver cancer Neg Hx    • Liver disease Neg Hx    • Rectal cancer Neg Hx    • Stomach cancer Neg Hx        Social History     Socioeconomic History   • Marital status:      Spouse name: Not on file   • Number of children: Not on file   • Years of education: Not on file   • Highest education level: Not on file   Tobacco Use   • Smoking status: Former Smoker     Types: Cigarettes     Last attempt to quit: 2009     Years since quittin.6   • Smokeless tobacco: Never Used   Substance and Sexual Activity   • Alcohol use: Yes     Comment: Occasionally   • Drug use: No   • Sexual activity: Defer           Objective   Physical Exam   Constitutional: He is oriented to person, place, and time. He appears cachectic.   HENT:   Head: Normocephalic and atraumatic.   Eyes: Pupils are equal, round, and reactive to light. Conjunctivae are normal.   Neck: Normal range of motion.   Cardiovascular: Normal rate, regular rhythm and normal heart sounds.   Pulmonary/Chest: Effort normal and breath sounds normal.   Abdominal: Soft. There is no tenderness.   Musculoskeletal: Normal range of motion. He exhibits no edema or deformity.   Nontender to palpation throughout including CT and L spines and all extremities, normal range of motion, neurovascularly intact   Neurological: He is alert and oriented to person, place, and time. He has normal strength.   Skin: Skin is warm.   Psychiatric: He has a normal mood and affect. His behavior is normal.   Nursing note and vitals reviewed.      Procedures          ED Course      Social work was notified.  Although the patient had no loss of consciousness, due to his age, mechanism of injury and headache, a CT scan of the head was ordered.     Lab Results (last 24 hours)     Procedure Component  Value Units Date/Time    CBC & Differential [[id number]] Collected:  08/20/20 1037    Specimen:  Blood from Arm, Right Updated:  08/20/20 1051    Narrative:       The following orders were created for panel order CBC & Differential.  Procedure                               Abnormality         Status                     ---------                               -----------         ------                     CBC Auto Differential[[id number]]        Abnormal            Final result                 Please view results for these tests on the individual orders.    Comprehensive Metabolic Panel [[id number]]  (Abnormal) Collected:  08/20/20 1037    Specimen:  Blood from Arm, Right Updated:  08/20/20 1108     Glucose 71 mg/dL      BUN 27 mg/dL      Creatinine 0.90 mg/dL      Sodium 141 mmol/L      Potassium 3.9 mmol/L      Chloride 98 mmol/L      CO2 22.0 mmol/L      Calcium 8.9 mg/dL      Total Protein 5.9 g/dL      Albumin 2.50 g/dL      ALT (SGPT) 21 U/L      AST (SGOT) 66 U/L      Alkaline Phosphatase 285 U/L      Total Bilirubin 1.3 mg/dL      eGFR Non African Amer 82 mL/min/1.73      Globulin 3.4 gm/dL      A/G Ratio 0.7 g/dL      BUN/Creatinine Ratio 30.0     Anion Gap 21.0 mmol/L     Narrative:       GFR Normal >60  Chronic Kidney Disease <60  Kidney Failure <15      CBC Auto Differential [[id number]]  (Abnormal) Collected:  08/20/20 1037    Specimen:  Blood from Arm, Right Updated:  08/20/20 1051     WBC 8.57 10*3/mm3      RBC 4.11 10*6/mm3      Hemoglobin 11.2 g/dL      Hematocrit 35.5 %      MCV 86.4 fL      MCH 27.3 pg      MCHC 31.5 g/dL      RDW 18.8 %      RDW-SD 58.5 fl      MPV 9.4 fL      Platelets 123 10*3/mm3      Neutrophil % 81.2 %      Lymphocyte % 9.5 %      Monocyte % 8.9 %      Eosinophil % 0.0 %      Basophil % 0.2 %      Immature Grans % 0.2 %      Neutrophils, Absolute 6.96 10*3/mm3      Lymphocytes, Absolute 0.81 10*3/mm3      Monocytes, Absolute 0.76 10*3/mm3      Eosinophils, Absolute 0.00 10*3/mm3       Basophils, Absolute 0.02 10*3/mm3      Immature Grans, Absolute 0.02 10*3/mm3      nRBC 0.0 /100 WBC         CT Head Without Contrast   Final Result   1. No acute intracranial findings.    2. Moderate chronic microvascular changes and volume loss.   3. Patchy mineralization of the calvarium, could be seen with   osteopenia, but infiltrative process such as multiple myeloma not   excluded. Recommend correlation with patient history.   This report was finalized on 08/20/2020 11:14 by Dr Keely Mandel MD.      CT Cervical Spine Without Contrast   Final Result   1. No evidence of acute osseous injury in the cervical spine.       This report was finalized on 08/20/2020 11:18 by Dr. Neal Leon MD.      CT Thoracic Spine Without Contrast   Final Result   1. Minimal age indeterminate central height loss of T4. Correlate with   patient's symptoms.   2. Subacute appearing fractures of the posterior right 10th and 11th   ribs.   3. Multiple pulmonary nodules, which appear increased compared to PET/CT   9/24/2018, concerning for progression of metastatic disease. Consider   comparison with interval outside imaging.   4. Descending thoracic aorta enlarged measuring 3.3 cm at the level of   the diaphragm.   This report was finalized on 08/20/2020 11:21 by Dr Keely Mandel MD.      CT Lumbar Spine Without Contrast   Final Result   1. When compared with the most recent exam, there is a new superior   endplate deformity of the L3 vertebral body involving the anterior and   middle columns. This is considered a possibly unstable fracture.       2. Stable compression deformity at L1.       Findings were called to Dr. Mancuso in the emergency department at 11:24   AM on 8/20/2020.       This report was finalized on 08/20/2020 11:25 by Dr. Neal Leon MD.        Labs showed mildly elevated LFTs.  CT scan of the head showed no acute findings.  Patient did have some patchy mineralization of the calvarium that is most likely  osteopenia.  CT scan of the C-spine showed no acute findings.  CT scan of the T-spine showed a age-indeterminate height loss at T4 as well as subacute fractures of the posterior right 10th and 11th ribs.  Patient had multiple pulmonary nodules consistent with his metastatic disease.  His descending thoracic aorta was also enlarged.  CT scan of the L-spine showed new superior endplate deformity of L3.  He also had a stable compression deformity at L1.  Dr. Wilks with neurosurgery was consulted.  He did not feel that the patient was a surgical candidate at this time.  An LSO brace was recommended and applied to the patient.  Family did not feel like they can care for this patient.  Hospice was consulted and patient did not meet criteria for inpatient hospice.  They have discussed the patient's case with social work and patient will eventually be placed in a nursing home.  Patient was then admitted for pain control and further treatment of his lumbar fracture to the hospitalist service.                                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  They state that the patient has colon cancer and has been getting progressively weaker at home.  Patient lives at home by himself.  The family has been trying to get hospice to come and evaluate patient, they were supposed to come today and be evaluated by [facility] but when the grandson went to check on the patient this morning he  was was found in the floor at his home.  He had tried to get up sometime in the night and fell, he did hit his head but denies any injury.  During exam patient complains of a headache, has some skin tears to his arms, and complains of pain on palpation of his back.       Sadia Boggs RN  08/20/20 1002      Electronically signed by Sadia Boggs RN at 08/20/20 1002     Sadia Boggs RN at 08/20/20 1236        [facility] who then transferred me to palliative care nurse Jasmyne. She states that the patient will have hospice care arranged through [facility] to who she will try to get into contact with and see when they will be able to go to the patients home and evaluate him.  She also states that she has been in contact today with the son regarding what kind of hospice care they want (whether that be at home, nursing home, or inpatient at a facility).  Patient has been adamant throughout his care that he wants to die at home, the palliative care nurse said for this to be done that the patient would have to have a 24/7 caregiver live at the home with him and she wasn't sure that the son or grandson had committed to this yet.       Sadia Boggs RN  08/20/20 1237       Sadia Boggs RN  08/20/20 1241      Electronically signed by Sadia Boggs RN at 08/20/20 1241         No current facility-administered medications for this encounter.      Current Outpatient Medications   Medication Sig Dispense Refill   • famotidine (Pepcid) 20 MG tablet Take 1 tablet by mouth 2 (Two) Times a Day.     • morphine 20 MG/ML concentrated solution Take 0.25 mL by mouth Every 3 (Three) Hours As Needed for Moderate Pain  or Severe Pain  (breathless ness). 30 mL 0   • tamsulosin (FLOMAX) 0.4 MG capsule 24 hr capsule Take 1 capsule by mouth Every Night.       Physician Progress Notes (last 24 hours) (Notes from 08/23/20 1435 through 08/24/20 1435)    No notes of this type exist for this encounter.          Consult Notes (last 72 hours) (Notes from 08/21/20 1435 through 08/24/20 1435)    No notes of this type exist for this encounter.            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  1. No evidence of acute osseous injury in the cervical spine.    IMPRESSION: CT scan head  1. No acute intracranial findings.   2. Moderate chronic microvascular changes and volume loss.  3. Patchy mineralization of the calvarium, could be seen with  osteopenia, but infiltrative process such as multiple myeloma not  excluded. Recommend correlation with patient history.    IMPRESSION: CT scan of the lumbar spine  1. When compared with the most recent exam, there is a new superior endplate deformity of the L3 vertebral body involving the anterior and middle columns. This is considered a possibly unstable fracture.  2. Stable compression deformity at L1.    IMPRESSION: CT scan thoracic spine  1. Minimal age indeterminate central height loss of T4. Correlate with patient's symptoms.  2. Subacute appearing fractures of the posterior right 10th  and 11th ribs.  3. Multiple pulmonary nodules, which appear increased compared to PET/CT 9/24/2018, concerning for progression of metastatic disease. Consider comparison with interval outside imaging.  4. Descending thoracic aorta enlarged measuring 3.3 cm at the level of the diaphragm.    Chief Complaint on Day of Discharge: none    History of Present Illness on Day of Discharge:   Patient is a 80-year-old  male presented ER for frequent fall.  Patient also complained of back pain.  History of colon cancer with mets to the liver.  Patient has been falling at home.  Patient is mostly bedbound at home.  CT scan shows L3 fracture possible unstable, compression fracture of L1, rib fracture right rib 10th-11 ribs.  Patient denies any loss of consciousness.  Patient did hit his head on the ground this morning.  Did complain of mild headaches and back pain from falling.  Patient is on chronic pain medication due to colon cancer, patient does not take his pain medication at home as directed.  Family wants patient in hospice.    Hospital Course:  The patient is a 80 y.o. male who presented to [facility] with colon cancer with mets to lung/plan for hospice/L2 fracture/falling/L1 compression fracture/rib fracture.      Colon cancer with progressive mets to the lungs.  Plan for hospice.     L3 fracture/falling/L1 compression fracture/height loss T4, subacute posterior rib fracture right 10th and 11th ribs/.  Morphine PRN.  Percocet PRN.  Cervical spine-No evidence of acute osseous injury in the cervical spine.  CT scan of the head-No acute intracranial findings, Moderate chronic microvascular changes and volume loss,  Patchy mineralization of the calvarium- could be seen with  Osteopenia- but infiltrative process such as multiple myeloma not excluded.   CT scan L-spine - new superior endplate deformity of the L3 vertebral body involving the anterior and middle columns- this is considered a possibly  "unstable fracture, stable compression fracture of L1.  CT scan of T-spine-Minimal age indeterminate central height loss of T4, subacute appearing fractures of the posterior right 10th and 11th ribs, multiple pulmonary nodules, which appear increased compared to PET/CT 9/24/2018, concerning for progression of metastatic disease, descending thoracic aorta enlargement measuring 3.3 cm at the level of diaphragm.  .  Dehydration.  IV fluids.     Nausea/vomiting/reflux/dysphasia history of Schatzki's ring/history of multiple esophageal dilatation.  Speech evaluate for feeding.  Pepcid IV.  Zofran PRN.     Anemia.  Will follow.  No sign of acute bleed.     Nutrition.  Purée/thin/consistent carb/nutrition supplement with Magic cup.     Code Status: DNR.  DNI.       Vital signs stable, afebrile. Patient be going to [place] with hospice.  Follow-up with hospice at [place].  Follow-up with nursing home physician as soon as able.    Condition on Discharge: stable    Physical Exam on Discharge:  /62 (BP Location: Right arm, Patient Position: Lying)   Pulse 53   Temp 97.4 °F (36.3 °C) (Oral)   Resp 18   Ht 182.9 cm (72\")   Wt 66.7 kg (147 lb)   SpO2 100%   BMI 19.94 kg/m²    Physical Exam  Constitutional: He appears well-developed.   Cachectic.   HENT:   Head: Normocephalic.   Eyes: Pupils are equal, round, and reactive to light. Conjunctivae are normal.   Neck: Neck supple. No JVD present.   Cardiovascular: Normal rate, regular rhythm, normal heart sounds and intact distal pulses. Exam reveals no gallop and no friction rub.   No murmur heard.  Pulmonary/Chest: No respiratory distress. He has no wheezes. He has no rales. He exhibits no tenderness.   Diminished breath sound bilateral, clear.   Abdominal: Soft. Bowel sounds are normal. He exhibits no distension. There is no tenderness. There is no rebound and no guarding.   Musculoskeletal: He exhibits no edema, tenderness or deformity.   Patient is curled up in " fetal position.   Neurological: He displays normal reflexes. No cranial nerve deficit. He exhibits abnormal muscle tone. Coordination abnormal.   Skin: Skin is warm and dry. Capillary refill takes 2 to 3 seconds. No rash noted.   Psychiatric: He has a normal mood and affect.   Nursing note and vitals reviewed.    Discharge Disposition:  Hospice/Medical Facility (DC - External)    Discharge Medications:     Discharge Medications      New Medications      Instructions Start Date   famotidine 20 MG tablet  Commonly known as:  Pepcid   20 mg, Oral, 2 Times Daily      morphine 20 MG/ML concentrated solution   5 mg, Oral, Every 3 Hours PRN         Continue These Medications      Instructions Start Date   tamsulosin 0.4 MG capsule 24 hr capsule  Commonly known as:  FLOMAX   1 capsule, Oral, Nightly         Stop These Medications    finasteride 5 MG tablet  Commonly known as:  PROSCAR     pantoprazole 40 MG EC tablet  Commonly known as:  PROTONIX     sucralfate 1 g tablet  Commonly known as:  Carafate     sucralfate 1 GM/10ML suspension  Commonly known as:  Carafate            Discharge Diet:   Diet Instructions     Advance Diet As Tolerated            Activity at Discharge:   Activity Instructions     Activity as Tolerated            Discharge Care Plan/Instructions: Discharged to [place] via ambulance.  Hospice at [place].    Follow-up Appointments:   Follow-up with hospice at [place].  Follow-up nursing home physician soon as able.    Electronically signed by Jaspreet Calderon MD, 08/21/20, 13:08.    Time: Greater than 30 minutes.        Electronically signed by Jaspreet Calderon MD at 08/21/20 1310            Rust, Byron M, APRN   Nurse Practitioner   Neurosurgery   Progress Notes   Attested   Date of Service: 08/21/20 1018   Creation Time: 08/21/20 1018             Attested      Attestation signed by Lico Wilks MD at 08/21/20 1003    I have reviewed this documentation and agree. I have examined the  patient and developed the treatment plan in conjunction with JOVAN Neri     Expand All Collapse All       Show:Clear all   ManualTemplateCopied  Added by:   Byron Hall APRN [place] for details                                                                                     NEUROSURGERY DAILY PROGRESS NOTE   ASSESSMENT:   Jonas Snow is a 80 y.o. 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 Mac with the [facility], 5-year history of chemotherapy recently discontinued over the past 3 weeks; type 2 diabetes, and vitamin D deficiency. He presents today for further evaluation post fall. Physical exam findings of ill-appearing and emaciated, diffuse bruises and skin tears to the upper and lower extremities, GCS 15, oriented x3, names objects, decreased sensation to the bilateral lower extremities from the knees, global weakness, and global hyporeflexia. Their imaging: CT the head shows no acute fractures or intracranial abnormalities. CT of the cervical spine shows no acute fractures, multilevel degenerative changes. MRI of the thoracic spine shows a questionable type A1 compression deformity at T4. MRI of the lumbar spine shows chronic appearing wedge-shaped compression deformity to L1 and acute appearing type A1 wedge-shaped compression fracture at L3, no significant malalignment, multilevel degenerative changes.     Medical History                                                                                                            Cosigned by: Lico Wilks MD at 08/21/20 8469   Revision History
